# Patient Record
Sex: FEMALE | Race: BLACK OR AFRICAN AMERICAN | NOT HISPANIC OR LATINO | Employment: FULL TIME | URBAN - METROPOLITAN AREA
[De-identification: names, ages, dates, MRNs, and addresses within clinical notes are randomized per-mention and may not be internally consistent; named-entity substitution may affect disease eponyms.]

---

## 2017-02-28 ENCOUNTER — TRANSFERRED RECORDS (OUTPATIENT)
Dept: HEALTH INFORMATION MANAGEMENT | Facility: CLINIC | Age: 47
End: 2017-02-28

## 2017-03-07 ENCOUNTER — OFFICE VISIT (OUTPATIENT)
Dept: FAMILY MEDICINE | Facility: CLINIC | Age: 47
End: 2017-03-07
Payer: OTHER MISCELLANEOUS

## 2017-03-07 VITALS
HEART RATE: 88 BPM | SYSTOLIC BLOOD PRESSURE: 122 MMHG | RESPIRATION RATE: 16 BRPM | TEMPERATURE: 98.6 F | BODY MASS INDEX: 38.17 KG/M2 | DIASTOLIC BLOOD PRESSURE: 72 MMHG | WEIGHT: 202 LBS

## 2017-03-07 DIAGNOSIS — Z13.0 SCREENING FOR DISORDER OF BLOOD AND BLOOD-FORMING ORGANS: ICD-10-CM

## 2017-03-07 DIAGNOSIS — Z13.6 CARDIOVASCULAR SCREENING; LDL GOAL LESS THAN 130: ICD-10-CM

## 2017-03-07 DIAGNOSIS — S63.501A RIGHT WRIST SPRAIN, INITIAL ENCOUNTER: Primary | ICD-10-CM

## 2017-03-07 DIAGNOSIS — Z13.1 SCREENING FOR DIABETES MELLITUS: ICD-10-CM

## 2017-03-07 LAB
ALBUMIN SERPL-MCNC: 3.8 G/DL (ref 3.4–5)
ALP SERPL-CCNC: 100 U/L (ref 40–150)
ALT SERPL W P-5'-P-CCNC: 19 U/L (ref 0–50)
ANION GAP SERPL CALCULATED.3IONS-SCNC: 7 MMOL/L (ref 3–14)
AST SERPL W P-5'-P-CCNC: 12 U/L (ref 0–45)
BILIRUB SERPL-MCNC: 0.3 MG/DL (ref 0.2–1.3)
BUN SERPL-MCNC: 12 MG/DL (ref 7–30)
CALCIUM SERPL-MCNC: 9.1 MG/DL (ref 8.5–10.1)
CHLORIDE SERPL-SCNC: 108 MMOL/L (ref 94–109)
CHOLEST SERPL-MCNC: 151 MG/DL
CO2 SERPL-SCNC: 25 MMOL/L (ref 20–32)
CREAT SERPL-MCNC: 0.82 MG/DL (ref 0.52–1.04)
ERYTHROCYTE [DISTWIDTH] IN BLOOD BY AUTOMATED COUNT: 15.2 % (ref 10–15)
GFR SERPL CREATININE-BSD FRML MDRD: 76 ML/MIN/1.7M2
GLUCOSE SERPL-MCNC: 95 MG/DL (ref 70–99)
HCT VFR BLD AUTO: 36.2 % (ref 35–47)
HDLC SERPL-MCNC: 35 MG/DL
HGB BLD-MCNC: 12.1 G/DL (ref 11.7–15.7)
LDLC SERPL CALC-MCNC: 94 MG/DL
MCH RBC QN AUTO: 23 PG (ref 26.5–33)
MCHC RBC AUTO-ENTMCNC: 33.4 G/DL (ref 31.5–36.5)
MCV RBC AUTO: 69 FL (ref 78–100)
NONHDLC SERPL-MCNC: 116 MG/DL
PLATELET # BLD AUTO: 305 10E9/L (ref 150–450)
POTASSIUM SERPL-SCNC: 4 MMOL/L (ref 3.4–5.3)
PROT SERPL-MCNC: 7.4 G/DL (ref 6.8–8.8)
RBC # BLD AUTO: 5.26 10E12/L (ref 3.8–5.2)
SODIUM SERPL-SCNC: 140 MMOL/L (ref 133–144)
TRIGL SERPL-MCNC: 110 MG/DL
WBC # BLD AUTO: 7.7 10E9/L (ref 4–11)

## 2017-03-07 PROCEDURE — 80053 COMPREHEN METABOLIC PANEL: CPT | Performed by: FAMILY MEDICINE

## 2017-03-07 PROCEDURE — 99213 OFFICE O/P EST LOW 20 MIN: CPT | Performed by: FAMILY MEDICINE

## 2017-03-07 PROCEDURE — 85027 COMPLETE CBC AUTOMATED: CPT | Performed by: FAMILY MEDICINE

## 2017-03-07 PROCEDURE — 80061 LIPID PANEL: CPT | Performed by: PHYSICIAN ASSISTANT

## 2017-03-07 PROCEDURE — 36415 COLL VENOUS BLD VENIPUNCTURE: CPT | Performed by: PHYSICIAN ASSISTANT

## 2017-03-07 NOTE — LETTER
[facility]  [address] [address] [facility] [address]  [phone]  Dept: [phone]      3/7/2017    Re: Jessie Seymour  was seen on 3/7/17 for injury. For the next week patient is cleared to use her left hand for tasks such as stocking, , answering phones and anything else that she feels comfortable doing with her left. She will be reassessed in 1 week.   She is to continue with right hand restrictions as she is currently doing.     Cordially,        Dr. Kirill MD  [facility]

## 2017-03-07 NOTE — PROGRESS NOTES
SUBJECTIVE:                                                    Jessie Seymour is a 46 year old female who presents to clinic today for the following health issues:      Joint Pain     Onset: 02/28/2017    Description:   Location: right wrist  Character: Dull ache    Intensity: moderate    Progression of Symptoms: better    Accompanying Signs & Symptoms:  Other symptoms: tingling and swelling   History:   Previous similar pain: no       Precipitating factors:   Trauma or overuse: YES    Alleviating factors:  Improved by: rest/inactivity, heat and support wrap       Therapies Tried and outcome:     Was trying to help a patient who was falling back in her wheelchair and was accidentally yanked by the wrist.   States she felt a pull immediately and when she finally let go states it was sore.   Was seen at [facility] about an hour later and was told it was bruised and sprained.   Had an xray of right hand which was negative.   Soaks her wrist at night in water and epsom salt, tylenol and ASA alternating.   Patient would like a new work restriction letter for work allowing her the ability to use left hand while she recovers.      Problem list and histories reviewed & adjusted, as indicated.  Additional history: as documented    Patient Active Problem List   Diagnosis     iamLUMBAGO     iamACCIDENT ON INDUSTR PREMISES     iamMV COLLISION NOS-     MEDICAL HISTORY OF - VERY DIFFICULT LAB DRAW and IV START     Allergic rhinitis     Tobacco use disorder     Family history of breast cancer     Obesity     Ovarian cyst     Female pelvic pain     Intermittent asthma     CARDIOVASCULAR SCREENING; LDL GOAL LESS THAN 130     Migraine headache     Melasma     Burping     Hiatal hernia     Smoker     Alpha thalassemia trait     Moderate aortic regurgitation     Snoring     Metrorrhagia     Menorrhagia     Past Surgical History   Procedure Laterality Date     Pelvis laparoscopy,dx  2001     Laparoscopy for endometriosis      Laparoscopic cholecystectomy  1991     Cholecystectomy, Laparoscopic     Surgical history of -   1971     left ovarian surgery for herniation, benign     Esophagoscopy, gastroscopy, duodenoscopy (egd), combined  7/8/2014     Procedure: COMBINED ESOPHAGOSCOPY, GASTROSCOPY, DUODENOSCOPY (EGD), BIOPSY SINGLE OR MULTIPLE;  Surgeon: Rodolfo Carlin MD;  Location: [facility]     Biopsy       Colonoscopy       Gyn surgery       Hernia repair       Eye surgery       Biopsy breast seed localization Left 1/21/2016     Procedure: BIOPSY BREAST SEED LOCALIZATION;  Surgeon: Perry Desai MD;  Location: [facility]     Dilation and curettage, hysteroscopy diagnostic, combined N/A 5/24/2016     Procedure: COMBINED DILATION AND CURETTAGE, HYSTEROSCOPY DIAGNOSTIC;  Surgeon: Adis Cummings MD;  Location: [facility]     Hysterectomy total abdominal N/A 6/28/2016     Procedure: HYSTERECTOMY TOTAL ABDOMINAL;  Surgeon: Adis Cummings MD;  Location: [facility]     Laparoscopy diagnostic (gyn) N/A 6/28/2016     Procedure: LAPAROSCOPY DIAGNOSTIC (GYN);  Surgeon: Adis Cummings MD;  Location: [facility]     Laparotomy, lysis adhesions, combined N/A 6/28/2016     Procedure: COMBINED LAPAROTOMY, LYSIS ADHESIONS;  Surgeon: Adis Cummings MD;  Location: [facility]       Social History   Substance Use Topics     Smoking status: Current Every Day Smoker     Packs/day: 0.50     Years: 15.00     Types: Cigarettes     Smokeless tobacco: Never Used      Comment: Pt. smokes appx. 6 cigarettes daily     Alcohol use No     Family History   Problem Relation Age of Onset     Cardiovascular Mother      long QT syndrom     HEART DISEASE Mother      entire family has heart problems of some type     Hypertension Mother      Depression Mother      Breast Cancer Mother      Thyroid Disease Mother      OSTEOPOROSIS Mother      HEART DISEASE Brother      CP Hypertension-7 brothers-5 alive right now      Hypertension Brother      HEART DISEASE Brother      CP  Hypertension     HEART DISEASE Father      not sure about details, had stroke     Hypertension Father      HEART DISEASE Sister      stroke      DIABETES Sister      HEART DISEASE Brother      long QT syndrome- at age of 16     HEART DISEASE Maternal Grandmother      Hypertension Maternal Grandmother      HEART DISEASE Maternal Grandfather      Hypertension Maternal Grandfather      DIABETES Sister      6 sisters     Musculoskeletal Disorder Sister      Genetic Disorder Sister      lupus     Hypertension Sister      DIABETES Maternal Uncle      Hypertension Sister      CEREBROVASCULAR DISEASE Sister      Hypertension Brother      Hypertension Paternal Grandmother      Hypertension Paternal Grandfather      Hypertension Sister      Asthma Sister      Allergies Other      most everyone in family has some type of allergy     Asthma Other      CANCER Other 40     one maternal cousin with bca in 50s, one paternal cousin with bca in 40s     CEREBROVASCULAR DISEASE Sister      OSTEOPOROSIS Sister      Gynecology Sister      3 sisters with PCOS     Blood Disease Sister      trade for sickle cell anemia     Cancer - colorectal No family hx of      DIABETES Sister      DIABETES Other      Hypertension Sister      Hypertension Brother      Hypertension Cousin      CEREBROVASCULAR DISEASE Sister      Breast Cancer Cousin      Other Cancer Other      Other Cancer Cousin      Depression Sister      Asthma Sister      Asthma Nephew      OSTEOPOROSIS Sister      Genetic Disorder Sister      Genetic Disorder Brother      Thyroid Disease Mother      Thyroid Disease Other            Reviewed and updated as needed this visit by clinical staff  Tobacco  Allergies  Meds       Reviewed and updated as needed this visit by Provider         ROS:  Constitutional, msk, neuro systems are negative, except as otherwise noted.    OBJECTIVE:                                                    /72 (BP Location: Right arm, Patient  Position: Chair, Cuff Size: Adult Large)  Pulse 88  Temp 98.6  F (37  C) (Oral)  Resp 16  Wt 202 lb (91.6 kg)  LMP 06/22/2016 (Exact Date)  Breastfeeding? No  BMI 38.17 kg/m2  Body mass index is 38.17 kg/(m^2).  GENERAL: healthy, alert and no distress  MS: right hand- pain with active ROM at wrist, no obvious swelling noted, sensation intact.     Diagnostic Test Results:  none      ASSESSMENT/PLAN:                                                        1. Right wrist sprain, initial encounter  - progressing as expected. Will continue with brace for now - reviewed trying to exercise hand at end of day to avoid stiffness. new work restrictions letter written for patient.   - order for DME; Equipment being ordered: wrist splint  Dispense: 1 Package; Refill: 0    See Patient Instructions    Vani Roy MD  [facility]

## 2017-03-07 NOTE — PATIENT INSTRUCTIONS
Follow up in 1 week or sooner if needed  Wrist Sprain  A sprain is an injury to the ligaments or capsule that holds a joint together. There are no broken bones. Most sprains take about 3 to 6 weeks to heal. If it a severe sprain where the ligament is completely torn, it can take months to recover.    Most wrist sprains are treated with a splint, wrist brace, or elastic wrap for support. Severe sprains may require surgery.  Home care    Keep your arm elevated to reduce pain and swelling. This is very important during the first 48 hours.    Apply an ice pack over the injured area for 15 to 20 minutes every 3 to 6 hours. You should do this for the first 24 to 48 hours. You can make an ice pack by filling a plastic bag that seals at the top with ice cubes and then wrapping it with a thin towel. Continue to use ice packs for relief of pain and swelling as needed. As the ice melts, be careful to avoid getting your wrap, splint, or cast wet. After 48 hours, apply heat (warm shower or warm bath) for 15 to 20 minutes several times a day, or alternate ice and heat.     You may use over-the-counter pain medicine to control pain, unless another pain medicine was prescribed. If you have chronic liver or kidney disease or ever had a stomach ulcer or GI bleeding, talk with your doctor before using these medicines.    If you were given a splint or brace, wear it for the time advised by your doctor.  Follow-up care  Follow up with your healthcare provider as advised. Any X-rays you had today don t show any broken bones, breaks, or fractures. Sometimes fractures don t show up on the first X-ray. Bruises and sprains can sometimes hurt as much as a fracture. These injuries can take time to heal completely. If your symptoms don t improve or they get worse, talk with your doctor. You may need a repeat X-ray. If X-rays were taken, you will be told of any new findings that may affect your care.  When to seek medical advice  Call your  healthcare provider right away if any of these occur:    Pain or swelling increases    Fingers or hand becomes cold, blue, numb, or tingly    2029-3647 [employer] [address] [place] [address], [place] [address]. All rights reserved. This information is not intended as a substitute for professional medical care. Always follow your healthcare professional's instructions.

## 2017-03-07 NOTE — MR AVS SNAPSHOT
After Visit Summary   3/7/2017    Jessie Seymour    MRN: [id number]           Patient Information     Date Of Birth          1970        Visit Information        Provider Department      3/7/2017 9:00 AM Vani Roy MD [facility]        Today's Diagnoses     Right wrist sprain, initial encounter    -  1      Care Instructions      Follow up in 1 week or sooner if needed  Wrist Sprain  A sprain is an injury to the ligaments or capsule that holds a joint together. There are no broken bones. Most sprains take about 3 to 6 weeks to heal. If it a severe sprain where the ligament is completely torn, it can take months to recover.    Most wrist sprains are treated with a splint, wrist brace, or elastic wrap for support. Severe sprains may require surgery.  Home care    Keep your arm elevated to reduce pain and swelling. This is very important during the first 48 hours.    Apply an ice pack over the injured area for 15 to 20 minutes every 3 to 6 hours. You should do this for the first 24 to 48 hours. You can make an ice pack by filling a plastic bag that seals at the top with ice cubes and then wrapping it with a thin towel. Continue to use ice packs for relief of pain and swelling as needed. As the ice melts, be careful to avoid getting your wrap, splint, or cast wet. After 48 hours, apply heat (warm shower or warm bath) for 15 to 20 minutes several times a day, or alternate ice and heat.     You may use over-the-counter pain medicine to control pain, unless another pain medicine was prescribed. If you have chronic liver or kidney disease or ever had a stomach ulcer or GI bleeding, talk with your doctor before using these medicines.    If you were given a splint or brace, wear it for the time advised by your doctor.  Follow-up care  Follow up with your healthcare provider as advised. Any X-rays you had today don t show any broken bones, breaks, or fractures.  Sometimes fractures don t show up on the first X-ray. Bruises and sprains can sometimes hurt as much as a fracture. These injuries can take time to heal completely. If your symptoms don t improve or they get worse, talk with your doctor. You may need a repeat X-ray. If X-rays were taken, you will be told of any new findings that may affect your care.  When to seek medical advice  Call your healthcare provider right away if any of these occur:    Pain or swelling increases    Fingers or hand becomes cold, blue, numb, or tingly    5020-0146 [employer]. [address] [place] [address]. All rights reserved. This information is not intended as a substitute for professional medical care. Always follow your healthcare professional's instructions.              Follow-ups after your visit        Your next 10 appointments already scheduled     Mar 13, 2017  3:15 PM CDT   MA SCREENING DIGITAL BILATERAL with EAMA1   [facility] ([facility])    [address] [place] [address] ,[address]  [facility] [address] [phone]           Do not use any powder, lotion or deodorant under your arms or on your breast. If you do, we will ask you to remove it before your exam.  Wear comfortable, two-piece clothing.  If you have any allergies, tell your care team.  Bring any previous mammograms from other facilities or have them mailed to the breast center.              Who to contact     If you have questions or need follow up information about today's clinic visit or your schedule please contact [facility] directly at [phone].  Normal or non-critical lab and imaging results will be communicated to you by MyChart, letter or phone within 4 business days after the clinic has received the results. If you do not hear from us within 7 days, please contact the clinic through MyChart or phone. If you have a critical or abnormal lab result, we will notify you by phone as soon as  possible.  Submit refill requests through [employer] or call your pharmacy and they will forward the refill request to us. Please allow 3 business days for your refill to be completed.          Additional Information About Your Visit        [employer] Information     [employer] gives you secure access to your electronic health record. If you see a primary care provider, you can also send messages to your care team and make appointments. If you have questions, please call your primary care clinic.  If you do not have a primary care provider, please call [phone] and they will assist you.        Care EveryWhere ID     This is your Care EveryWhere ID. This could be used by other organizations to access your [facility] medical records  [id number]        Your Vitals Were     Pulse Temperature Respirations Last Period Breastfeeding? BMI (Body Mass Index)    88 98.6  F (37  C) (Oral) 16 06/22/2016 (Exact Date) No 38.17 kg/m2       Blood Pressure from Last 3 Encounters:   03/07/17 122/72   12/15/16 112/70   08/01/16 108/70    Weight from Last 3 Encounters:   03/07/17 202 lb (91.6 kg)   12/15/16 205 lb 14.4 oz (93.4 kg)   08/01/16 203 lb 14.4 oz (92.5 kg)              Today, you had the following     No orders found for display         Today's Medication Changes          These changes are accurate as of: 3/7/17  9:33 AM.  If you have any questions, ask your nurse or doctor.               Start taking these medicines.        Dose/Directions    order for DME   Used for:  Right wrist sprain, initial encounter   Started by:  Vani Roy MD        Equipment being ordered: wrist splint   Quantity:  1 Package   Refills:  0         These medicines have changed or have updated prescriptions.        Dose/Directions    fluticasone 50 MCG/ACT spray   Commonly known as:  FLONASE   This may have changed:    - when to take this  - reasons to take this   Used for:  Seasonal allergic rhinitis        Dose:  1-2 spray   Spray 1-2  sprays into both nostrils daily   Quantity:  1 g   Refills:  11            Where to get your medicines      Some of these will need a paper prescription and others can be bought over the counter.  Ask your nurse if you have questions.     Bring a paper prescription for each of these medications     order for DME                Primary Care Provider Office Phone # Fax #    Ramona Ann Aaseby-Aguilera, PA-C [phone]       [facility] [address]  [facility] [address]        Thank you!     Thank you for choosing [facility]  for your care. Our goal is always to provide you with excellent care. Hearing back from our patients is one way we can continue to improve our services. Please take a few minutes to complete the written survey that you may receive in the mail after your visit with us. Thank you!             Your Updated Medication List - Protect others around you: Learn how to safely use, store and throw away your medicines at www.disposemymeds.org.          This list is accurate as of: 3/7/17  9:33 AM.  Always use your most recent med list.                   Brand Name Dispense Instructions for use    * albuterol (2.5 MG/3ML) 0.083% neb solution     3 Box    Take 1 vial (2.5 mg) by nebulization every 6 hours as needed for shortness of breath / dyspnea       * albuterol 108 (90 BASE) MCG/ACT Inhaler    albuterol    3 Inhaler    Inhale 2 puffs into the lungs 4 times daily as needed for shortness of breath / dyspnea       aspirin 81 MG tablet      Take 81 mg by mouth daily       camphor-menthol 0.5-0.5 % Lotn    SARNA    59 mL    Apply 1 mL topically every 6 hours as needed for skin care Apply to palms of hands 2-3 times daily       diphenhydrAMINE 25 MG tablet    BENADRYL    60 tablet    Take 1-2 tablets (25-50 mg) by mouth every 6 hours as needed for itching or allergies       fluticasone 50 MCG/ACT spray    FLONASE    1 g    Spray 1-2 sprays into both nostrils  daily       hydrocortisone valerate 0.2 % ointment    WEST-LESLY    60 g    Apply sparingly to affected area three times daily as needed.       ibuprofen 600 MG tablet    ADVIL/MOTRIN    90 tablet    Take 1 tablet (600 mg) by mouth every 6 hours as needed for moderate pain       ketorolac 10 MG tablet    TORADOL    4 tablet    Take 1 tablet (10 mg) by mouth every 6 hours as needed for pain       nortriptyline 10 MG capsule    PAMELOR    90 capsule    Using 10 mg once every 3 days       order for DME     1 Units    Please dispense one neb machine Use as directed Wanda Hines MD       order for DME     1 Package    Equipment being ordered: wrist splint       PREDNISONE PO      Take 20 mg by mouth 2 times daily       SUDAFED PO      Take 30 mg by mouth daily as needed for congestion       * Notice:  This list has 2 medication(s) that are the same as other medications prescribed for you. Read the directions carefully, and ask your doctor or other care provider to review them with you.

## 2017-03-07 NOTE — NURSING NOTE
"Chief Complaint   Patient presents with     Work Comp     work comp injury, date of injury 02/28/2017, c/o right wrist pain and swelling     Initial /72 (BP Location: Right arm, Patient Position: Chair, Cuff Size: Adult Large)  Pulse 88  Temp 98.6  F (37  C) (Oral)  Resp 16  Wt 202 lb (91.6 kg)  LMP 06/22/2016 (Exact Date)  Breastfeeding? No  BMI 38.17 kg/m2 Estimated body mass index is 38.17 kg/(m^2) as calculated from the following:    Height as of 12/15/16: 5' 1\" (1.549 m).    Weight as of this encounter: 202 lb (91.6 kg)..  BP completed using cuff size large.            María Elena Alejandro/NABEEL    "

## 2017-03-07 NOTE — LETTER
[facility]  [address] [facility] [address]  [phone]  Dept: [phone]      3/7/2017    Re: Jessie Seymour      TO WHOM IT MAY CONCERN:    Jessie Seymour  was seen on 3/7/17 for injury. For the next week patient is cleared to use her left hand for tasks such as stocking, , answering phones and anything else that she feels comfortable doing with her left. She will be reassessed in 1 week.       Cordially,        Dr. Kirill MD  /[facility]//

## 2017-03-13 ENCOUNTER — RADIANT APPOINTMENT (OUTPATIENT)
Dept: MAMMOGRAPHY | Facility: CLINIC | Age: 47
End: 2017-03-13
Payer: COMMERCIAL

## 2017-03-13 DIAGNOSIS — Z12.31 VISIT FOR SCREENING MAMMOGRAM: ICD-10-CM

## 2017-03-13 PROCEDURE — G0202 SCR MAMMO BI INCL CAD: HCPCS | Mod: TC

## 2017-03-14 ENCOUNTER — OFFICE VISIT (OUTPATIENT)
Dept: FAMILY MEDICINE | Facility: CLINIC | Age: 47
End: 2017-03-14
Payer: OTHER MISCELLANEOUS

## 2017-03-14 VITALS
DIASTOLIC BLOOD PRESSURE: 77 MMHG | TEMPERATURE: 98.1 F | BODY MASS INDEX: 38.17 KG/M2 | RESPIRATION RATE: 16 BRPM | WEIGHT: 202 LBS | SYSTOLIC BLOOD PRESSURE: 124 MMHG | HEART RATE: 88 BPM

## 2017-03-14 DIAGNOSIS — S63.501D RIGHT WRIST SPRAIN, SUBSEQUENT ENCOUNTER: Primary | ICD-10-CM

## 2017-03-14 PROCEDURE — 99213 OFFICE O/P EST LOW 20 MIN: CPT | Performed by: FAMILY MEDICINE

## 2017-03-14 NOTE — PROGRESS NOTES
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   Since the last visit, states pain is improving and is able to keep wrist out of brace at night.   Has since been fired and is working to be re-instated at another facility.        Problem list and histories reviewed & adjusted, as indicated.  Additional history: as documented    Patient Active Problem List   Diagnosis     iamLUMBAGO     iamACCIDENT ON INDUSTR PREMISES     iamMV COLLISION NOS-     MEDICAL HISTORY OF - VERY DIFFICULT LAB DRAW and IV START     Allergic rhinitis     Tobacco use disorder     Family history of breast cancer     Obesity     Ovarian cyst     Female pelvic pain     Intermittent asthma     CARDIOVASCULAR SCREENING; LDL GOAL LESS THAN 130     Migraine headache     Melasma     Burping     Hiatal hernia     Smoker     Alpha thalassemia trait     Moderate aortic regurgitation     Snoring     Metrorrhagia     Menorrhagia     Past Surgical History   Procedure Laterality Date     Pelvis laparoscopy,dx  2001     Laparoscopy for endometriosis     Laparoscopic cholecystectomy  1991     Cholecystectomy, Laparoscopic     Surgical history of -   1971     left ovarian surgery for herniation, benign     Esophagoscopy, gastroscopy, duodenoscopy (egd), combined  7/8/2014     Procedure: COMBINED ESOPHAGOSCOPY, GASTROSCOPY, DUODENOSCOPY (EGD), BIOPSY SINGLE OR MULTIPLE;  Surgeon: Rodolfo Carlin MD;  Location:  GI     Biopsy       Colonoscopy       Gyn surgery       Hernia repair       Eye surgery       Biopsy breast seed localization Left 1/21/2016     Procedure: BIOPSY BREAST SEED LOCALIZATION;  Surgeon: Perry Desai MD;  Location:  OR     Dilation and curettage, hysteroscopy diagnostic,  combined N/A 2016     Procedure: COMBINED DILATION AND CURETTAGE, HYSTEROSCOPY DIAGNOSTIC;  Surgeon: Adis Cummings MD;  Location: [facility] OR     Hysterectomy total abdominal N/A 2016     Procedure: HYSTERECTOMY TOTAL ABDOMINAL;  Surgeon: Adis Cummings MD;  Location: [facility] OR     Laparoscopy diagnostic (gyn) N/A 2016     Procedure: LAPAROSCOPY DIAGNOSTIC (GYN);  Surgeon: Adis Cummings MD;  Location: [facility] OR     Laparotomy, lysis adhesions, combined N/A 2016     Procedure: COMBINED LAPAROTOMY, LYSIS ADHESIONS;  Surgeon: Adis Cummings MD;  Location: [facility] OR       Social History   Substance Use Topics     Smoking status: Current Every Day Smoker     Packs/day: 0.50     Years: 15.00     Types: Cigarettes     Smokeless tobacco: Never Used      Comment: Pt. smokes appx. 6 cigarettes daily     Alcohol use No     Family History   Problem Relation Age of Onset     Cardiovascular Mother      long QT syndrom     HEART DISEASE Mother      entire family has heart problems of some type     Hypertension Mother      Depression Mother      Breast Cancer Mother      Thyroid Disease Mother      OSTEOPOROSIS Mother      HEART DISEASE Brother      CP Hypertension-7 brothers-5 alive right now      Hypertension Brother      HEART DISEASE Brother      CP Hypertension     HEART DISEASE Father      not sure about details, had stroke     Hypertension Father      HEART DISEASE Sister      stroke      DIABETES Sister      HEART DISEASE Brother      long QT syndrome- at age of 16     HEART DISEASE Maternal Grandmother      Hypertension Maternal Grandmother      HEART DISEASE Maternal Grandfather      Hypertension Maternal Grandfather      DIABETES Sister      6 sisters     Musculoskeletal Disorder Sister      Genetic Disorder Sister      lupus     Hypertension Sister      DIABETES Maternal Uncle      Hypertension Sister      CEREBROVASCULAR DISEASE Sister      Hypertension Brother      Hypertension Paternal  Grandmother      Hypertension Paternal Grandfather      Hypertension Sister      Asthma Sister      Allergies Other      most everyone in family has some type of allergy     Asthma Other      CANCER Other 40     one maternal cousin with bca in 50s, one paternal cousin with bca in 40s     CEREBROVASCULAR DISEASE Sister      OSTEOPOROSIS Sister      Gynecology Sister      3 sisters with PCOS     Blood Disease Sister      trade for sickle cell anemia     Cancer - colorectal No family hx of      DIABETES Sister      DIABETES Other      Hypertension Sister      Hypertension Brother      Hypertension Cousin      CEREBROVASCULAR DISEASE Sister      Breast Cancer Cousin      Other Cancer Other      Other Cancer Cousin      Depression Sister      Asthma Sister      Asthma Nephew      OSTEOPOROSIS Sister      Genetic Disorder Sister      Genetic Disorder Brother      Thyroid Disease Mother      Thyroid Disease Other            Reviewed and updated as needed this visit by clinical staff  Tobacco       Reviewed and updated as needed this visit by Provider         ROS:  Constitutional, msk systems are negative, except as otherwise noted.    OBJECTIVE:                                                    /77 (BP Location: Right arm, Patient Position: Chair, Cuff Size: Adult Large)  Pulse 88  Temp 98.1  F (36.7  C) (Oral)  Resp 16  Wt 202 lb (91.6 kg)  LMP 06/22/2016 (Exact Date)  Breastfeeding? No  BMI 38.17 kg/m2  Body mass index is 38.17 kg/(m^2).  GENERAL: healthy, alert and no distress  MS: right wrist- mild TTP Flexor carpi ulnar tendon, pain with flexion and extension at wrist,  strength- 4/5  NEURO: sensation intact   Diagnostic Test Results:  none      ASSESSMENT/PLAN:                                                        1. Right wrist sprain, subsequent encounter  - improving.  Patient to continue with right wrist restrictions for now. Will refer to PT to help with her rehab process.   - [facility], AND  CHIROPRACTIC REFERRAL    See Patient Instructions    Vnai Roy MD  [facility]

## 2017-03-14 NOTE — NURSING NOTE
"Chief Complaint   Patient presents with     Work Comp     f/u work comp injury---c/o right wrist injury---has improved     Initial /77 (BP Location: Right arm, Patient Position: Chair, Cuff Size: Adult Large)  Pulse 88  Temp 98.1  F (36.7  C) (Oral)  Resp 16  Wt 202 lb (91.6 kg)  LMP 06/22/2016 (Exact Date)  Breastfeeding? No  BMI 38.17 kg/m2 Estimated body mass index is 38.17 kg/(m^2) as calculated from the following:    Height as of 12/15/16: 5' 1\" (1.549 m).    Weight as of this encounter: 202 lb (91.6 kg)..  BP completed using cuff size large.            María Elena Alejandro/NABEEL    "

## 2017-03-14 NOTE — LETTER
[facility]  [id number] [place] [id number]  [phone]  Dept: [phone]      3/14/2017    Re: Jessie Seymour      TO WHOM IT MAY CONCERN:    Jessie Seymour  was seen on 3/14/17.  Patient's injury is progressing as expected. Will refer to physical therapy for further treatment. 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 She is to continue with right hand restrictions for now.       Cordially,          Dr. Kirill MD  [facility]

## 2017-03-14 NOTE — MR AVS SNAPSHOT
After Visit Summary   3/14/2017    eJssie Seymour    MRN: [id number]           Patient Information     Date Of Birth          1970        Visit Information        Provider Department      3/14/2017 10:00 AM Vani Roy MD [facility]        Today's Diagnoses     Right wrist sprain, subsequent encounter    -  1      Care Instructions    Follow up with physical therapy for further treatment.         Follow-ups after your visit        Additional Services     [facility] PT, HAND, AND CHIROPRACTIC REFERRAL       **This order will print in the [facility] Scheduling Office**    Physical Therapy, Hand Therapy and Chiropractic Care are available through:    *[facility]  *[facility]  *[facility]    Call one number to schedule at any of the above locations: [phone].    Your provider has referred you to: Physical Therapy at [facility] or [facility]    Indication/Reason for Referral: Hand Pain  Onset of Illness: 2/28/17  Therapy Orders: Evaluate and Treat  Special Programs: None  Special Request: None    Jai Vergara      Additional Comments for the Therapist or Chiropractor:     Please be aware that coverage of these services is subject to the terms and limitations of your health insurance plan.  Call member services at your health plan with any benefit or coverage questions.      Please bring the following to your appointment:    *Your personal calendar for scheduling future appointments  *Comfortable clothing                  Who to contact     If you have questions or need follow up information about today's clinic visit or your schedule please contact [facility] directly at [phone].  Normal or non-critical lab and imaging results will be communicated to you by MyChart, letter or phone within 4 business days after the clinic has received the results. If you do not hear from us within 7 days, please contact  the clinic through [employer] or phone. If you have a critical or abnormal lab result, we will notify you by phone as soon as possible.  Submit refill requests through [employer] or call your pharmacy and they will forward the refill request to us. Please allow 3 business days for your refill to be completed.          Additional Information About Your Visit        [employer] Social Information     [employer] gives you secure access to your electronic health record. If you see a primary care provider, you can also send messages to your care team and make appointments. If you have questions, please call your primary care clinic.  If you do not have a primary care provider, please call [phone] and they will assist you.        Care EveryWhere ID     This is your Care EveryWhere ID. This could be used by other organizations to access your [facility] medical records  [id number]        Your Vitals Were     Pulse Temperature Respirations Last Period Breastfeeding? BMI (Body Mass Index)    88 98.1  F (36.7  C) (Oral) 16 06/22/2016 (Exact Date) No 38.17 kg/m2       Blood Pressure from Last 3 Encounters:   03/14/17 124/77   03/07/17 122/72   12/15/16 112/70    Weight from Last 3 Encounters:   03/14/17 202 lb (91.6 kg)   03/07/17 202 lb (91.6 kg)   12/15/16 205 lb 14.4 oz (93.4 kg)              We Performed the Following     [facility], AND [facility]RACT[facility] REFERRAL          Today's Medication Changes          These changes are accurate as of: 3/14/17 10:00 AM.  If you have any questions, ask your nurse or doctor.               These medicines have changed or have updated prescriptions.        Dose/Directions    fluticasone 50 MCG/ACT spray   Commonly known as:  FLONASE   This may have changed:    - when to take this  - reasons to take this   Used for:  Seasonal allergic rhinitis        Dose:  1-2 spray   Spray 1-2 sprays into both nostrils daily   Quantity:  1 g   Refills:  11                Primary Care Provider Office Phone # Fax #     Ramona Ann Aaseby-Aguilera, PA-C [phone]       [facility] [address]  [facility] [address]        Thank you!     Thank you for choosing [facility]  for your care. Our goal is always to provide you with excellent care. Hearing back from our patients is one way we can continue to improve our services. Please take a few minutes to complete the written survey that you may receive in the mail after your visit with us. Thank you!             Your Updated Medication List - Protect others around you: Learn how to safely use, store and throw away your medicines at www.disposemymeds.org.          This list is accurate as of: 3/14/17 10:00 AM.  Always use your most recent med list.                   Brand Name Dispense Instructions for use    * albuterol (2.5 MG/3ML) 0.083% neb solution     3 Box    Take 1 vial (2.5 mg) by nebulization every 6 hours as needed for shortness of breath / dyspnea       * albuterol 108 (90 BASE) MCG/ACT Inhaler    albuterol    3 Inhaler    Inhale 2 puffs into the lungs 4 times daily as needed for shortness of breath / dyspnea       aspirin 81 MG tablet      Take 81 mg by mouth daily       camphor-menthol 0.5-0.5 % Lotn    SARNA    59 mL    Apply 1 mL topically every 6 hours as needed for skin care Apply to palms of hands 2-3 times daily       diphenhydrAMINE 25 MG tablet    BENADRYL    60 tablet    Take 1-2 tablets (25-50 mg) by mouth every 6 hours as needed for itching or allergies       fluticasone 50 MCG/ACT spray    FLONASE    1 g    Spray 1-2 sprays into both nostrils daily       hydrocortisone valerate 0.2 % ointment    WEST-LESLY    60 g    Apply sparingly to affected area three times daily as needed.       ibuprofen 600 MG tablet    ADVIL/MOTRIN    90 tablet    Take 1 tablet (600 mg) by mouth every 6 hours as needed for moderate pain       ketorolac 10 MG tablet    TORADOL    4 tablet    Take 1 tablet (10 mg) by mouth every 6 hours as  needed for pain       nortriptyline 10 MG capsule    PAMELOR    90 capsule    Using 10 mg once every 3 days       order for DME     1 Units    Please dispense one neb machine Use as directed Wanda Hines MD       order for DME     1 Package    Equipment being ordered: wrist splint       PREDNISONE PO      Take 20 mg by mouth 2 times daily       SUDAFED PO      Take 30 mg by mouth daily as needed for congestion       * Notice:  This list has 2 medication(s) that are the same as other medications prescribed for you. Read the directions carefully, and ask your doctor or other care provider to review them with you.

## 2017-03-20 ENCOUNTER — THERAPY VISIT (OUTPATIENT)
Dept: OCCUPATIONAL THERAPY | Facility: CLINIC | Age: 47
End: 2017-03-20
Payer: OTHER MISCELLANEOUS

## 2017-03-20 DIAGNOSIS — M25.531 RIGHT WRIST PAIN: Primary | ICD-10-CM

## 2017-03-20 PROCEDURE — 97165 OT EVAL LOW COMPLEX 30 MIN: CPT | Mod: GO | Performed by: OCCUPATIONAL THERAPIST

## 2017-03-20 PROCEDURE — 29125 APPL SHORT ARM SPLINT STATIC: CPT | Mod: GO | Performed by: OCCUPATIONAL THERAPIST

## 2017-03-20 NOTE — MR AVS SNAPSHOT
After Visit Summary   3/20/2017    Jessie Seymour    MRN: [id number]           Patient Information     Date Of Birth          1970        Visit Information        Provider Department      3/20/2017 8:30 AM Zuleima Porter OT [facility]        Today's Diagnoses     Right wrist pain    -  1       Follow-ups after your visit        Your next 10 appointments already scheduled     Mar 27, 2017 10:00 AM CDT   [facility] ([facility]  )    [address] [address] [address]   [phone]              Who to contact     If you have questions or need follow up information about today's clinic visit or your schedule please contact [facility] directly at [phone].  Normal or non-critical lab and imaging results will be communicated to you by MyChart, letter or phone within 4 business days after the clinic has received the results. If you do not hear from us within 7 days, please contact the clinic through [employer] or phone. If you have a critical or abnormal lab result, we will notify you by phone as soon as possible.  Submit refill requests through [employer] or call your pharmacy and they will forward the refill request to us. Please allow 3 business days for your refill to be completed.          Additional Information About Your Visit        MyChart Information     [employer] gives you secure access to your electronic health record. If you see a primary care provider, you can also send messages to your care team and make appointments. If you have questions, please call your primary care clinic.  If you do not have a primary care provider, please call [phone] and they will assist you.        Care EveryWhere ID     This is your Care EveryWhere ID. This could be used by other organizations to access your [facility] medical records  [id number]        Your Vitals Were     Last Period                   06/22/2016 (Exact  Date)            Blood Pressure from Last 3 Encounters:   03/14/17 124/77   03/07/17 122/72   12/15/16 112/70    Weight from Last 3 Encounters:   03/14/17 91.6 kg (202 lb)   03/07/17 91.6 kg (202 lb)   12/15/16 93.4 kg (205 lb 14.4 oz)              We Performed the Following     APPLY SHORT ARM SPLINT STATIC     HC OT EVAL, LOW COMPLEXITY          Today's Medication Changes          These changes are accurate as of: 3/20/17 12:05 PM.  If you have any questions, ask your nurse or doctor.               These medicines have changed or have updated prescriptions.        Dose/Directions    fluticasone 50 MCG/ACT spray   Commonly known as:  FLONASE   This may have changed:    - when to take this  - reasons to take this   Used for:  Seasonal allergic rhinitis        Dose:  1-2 spray   Spray 1-2 sprays into both nostrils daily   Quantity:  1 g   Refills:  11                Primary Care Provider Office Phone # Fax #    Areli Ann Aaseby-Aguilera, PA-C [phone]       [facility] [address] [facility] [address]        Thank you!     Thank you for choosing [facility]  for your care. Our goal is always to provide you with excellent care. Hearing back from our patients is one way we can continue to improve our services. Please take a few minutes to complete the written survey that you may receive in the mail after your visit with us. Thank you!             Your Updated Medication List - Protect others around you: Learn how to safely use, store and throw away your medicines at www.disposemymeds.org.          This list is accurate as of: 3/20/17 12:05 PM.  Always use your most recent med list.                   Brand Name Dispense Instructions for use    * albuterol (2.5 MG/3ML) 0.083% neb solution     3 Box    Take 1 vial (2.5 mg) by nebulization every 6 hours as needed for shortness of breath / dyspnea       * albuterol 108 (90 BASE) MCG/ACT Inhaler    albuterol    3 Inhaler     Inhale 2 puffs into the lungs 4 times daily as needed for shortness of breath / dyspnea       aspirin 81 MG tablet      Take 81 mg by mouth daily       camphor-menthol 0.5-0.5 % Lotn    SARNA    59 mL    Apply 1 mL topically every 6 hours as needed for skin care Apply to palms of hands 2-3 times daily       diphenhydrAMINE 25 MG tablet    BENADRYL    60 tablet    Take 1-2 tablets (25-50 mg) by mouth every 6 hours as needed for itching or allergies       fluticasone 50 MCG/ACT spray    FLONASE    1 g    Spray 1-2 sprays into both nostrils daily       hydrocortisone valerate 0.2 % ointment    WEST-LESLY    60 g    Apply sparingly to affected area three times daily as needed.       ibuprofen 600 MG tablet    ADVIL/MOTRIN    90 tablet    Take 1 tablet (600 mg) by mouth every 6 hours as needed for moderate pain       ketorolac 10 MG tablet    TORADOL    4 tablet    Take 1 tablet (10 mg) by mouth every 6 hours as needed for pain       nortriptyline 10 MG capsule    PAMELOR    90 capsule    Using 10 mg once every 3 days       order for DME     1 Units    Please dispense one neb machine Use as directed Wanda Hines MD       order for DME     1 Package    Equipment being ordered: wrist splint       PREDNISONE PO      Take 20 mg by mouth 2 times daily       SUDAFED PO      Take 30 mg by mouth daily as needed for congestion       * Notice:  This list has 2 medication(s) that are the same as other medications prescribed for you. Read the directions carefully, and ask your doctor or other care provider to review them with you.

## 2017-03-20 NOTE — PROGRESS NOTES
Hand Therapy Initial Evaluation    Current Date:  3/20/2017    Diagnosis: R wrist sprain  Onset:  2/28/17  MD order:  3/14/17            Subjective  Jessie Seymour is a 46 year old Right hand dominant female.    Patient reports symptoms of pain, stiffness/loss of motion, weakness/loss of strength, edema, numbness and tingling  of the right wrist which occurred due to a patient at working grabbing onto wrist while falling down into w/c. Since onset symptoms are Gradually getting better..  Special tests:  x-ray.  Previous treatment: OTC thumb spica from MD.    General health as reported by patient is good.  Pertinent medical history includes:overweight, heart problems, anemia, asthma, migraines/headaches, smoking.  Medical allergies:medications.  Surgical history: other: listed in medical chart.  Medication history: anti-inflammatory, pain, preventative for migraines, torodol.  Pt states that her son will be having surgery next week, he will be NWB BLE.    Current occupation is Nurse, working at nursing home  Currently working in alternate job-pill setup/INR  Job Tasks: prolonged standing, lifting/carrying, pushing/pulling, repetitive tasks, computer work  Barriers include:none  Prior functional level:  independent-shared household chores  Red flags:  none    Occupational Performance Deficits as reported by patient: bathing/showering, toileting, dressing, feeding, functional mobility, hygiene and grooming, care of pets, child rearing, health management and maintenance, home establishment and management, meal preparation and cleanup, shopping, work and volunteer activities    Additional Occupational Profile Information (patterns of daily living, interests, values and needs): Pt would like to decrease pain in order to return to PLOF with ADL's, IADL's and work tasks.       Functional Outcome Measure:   Upper Extremity Functional Index Score:  SCORE:   Column Totals: 40/80   (A lower score indicates greater  disability.)      Objective  Pain Level Report  VAS(0-10) 3/20/2017   At Rest: 2/10   At Worst: 4/10     Report of Pain:  Location:  Ulnar border of FA/wrist, pt stating thumb pain d/t splint use  Pain Quality:  Dull, Sharp and Throbbing  Frequency: constant    Pain is worst:  daytime or nighttime  Exacerbated by: general use of hand  Relieved by:  cold, heat and rest. Use of tylenol    Edema:  MILD  Sensation: WNL throughout all nerve distributions; per patient report    ROM:  Wrist  3/20/2017   AROM(PROM) Left Right   Extension 70 55   Flexion 63 55   RD 9 3   UD 40 32     Tenderness: Pain level report on scale 0-10/10  Date 3/20/2017    Side Right    Ulna Styloid 0    TFCC 4    Distal Radius 0    Radial Styloid 0    DRUJ 2    Volar Scaphoid 1    Dorsal Scaphoid 1    Volar Lunate 4    Dorsal Lunate 2      Special Tests  Ulnar Dorsal Zone: (+ for hypermobile or - for hypomobile) Pain 0-10/10  Date 3/20/2017    Side Right    Radiocarpal P/S (TFCC--stab f/a, rotate with some compression) 4    Ballottement II P/S (TFCC--stab hand/wrist, pt p/s) 4    TFCC load Test (UD, axially load wrist c volar/dorsal mvmt) Hyper  4    UMTDG test (TFCC--approximate the pisotriquetral and ulna) 5    Lunotriquetral Sheer Test nt    Piano Key Sign (DRUJ) nt    Piano Key Test (DRUJ--distal ulna moved in pro/sup) nt    Ballottement I: E/F (DRUJ--stabilize hand/wrist, pt e/f of elbow) nt    Volar RU Ligament Shift (DRUJ--stab ulna and wrist, push radius volarly) nt    Dorsal RU Ligament Shift (DRUJ--stab ulna and wrist, push radius dorsally) nt        Strength: (Measured in pounds, pain scale 0-10/10)    Date 3/20/2017        Trials Left Right Left Right Left Right Left Right Left Right Left Right   1 64 20             2               3               Avg               Pain  4                 Assessment  Patient presents with symptoms consistent with diagnosis of wrist sprain,  with conservative intervention.  Special testing during  evaluation indicating possible involvement of the TFCC.    Patient's limitations or Problem List includes:  Pain, Decreased ROM/motion, Weakness, Decreased stability, Decreased , Decreased coordination, Decreased dexterity and Tightness in musculature of the right wrist and hand which interferes with the patient's ability to perform Self Care Tasks, Work Tasks, Recreational Activities, Household Chores and Driving  as compared to previous level of function.    Rehab Potential:  Excellent - Return to full activity, no limitations    Patient will benefit from skilled Occupational Therapy to increase ROM, flexibility, overall strength, stability of wrist, coordination and dexterity and decrease pain and muscle tension to return to previous activity level and resume normal daily tasks and to reach their rehab potential.    Assessment of Occupational Performance:  5 or more Performance Deficits  Identified Performance Deficits: bathing/showering, toileting, dressing, feeding, functional mobility, hygiene and grooming, care of pets, child rearing, health management and maintenance, home establishment and management, meal preparation and cleanup, shopping, work and volunteer activities      Clinical Decision Making (Complexity): Low complexity    Barriers to Learning:  No barrier    Communication Issues:  Patient appears to be able to clearly communicate and understand verbal and written communication and follow directions correctly.    Treatment Explanation:  The following has been discussed with the patient:  RX ordered/plan of care  Anticipated outcomes  Possible risks and side effects    Plan  Frequency:  1 X week, once daily  Duration:  for 8 weeks    Treatment Plan:  Modalities:  US and Iontophoresis  Therapeutic Exercise:  AROM, AAROM, PROM, Tendon Gliding, Isotonics and Isometrics  Neuromuscular re-education:  Coordination/Dexterity, Proprioceptive Training, Posture and Kinesiotaping  Manual Techniques:  Joint  mobilization, Myofascial release and Manual edema mobilization  Orthotic Fabrication:  Static orthosis  Self Care:  Self Care Tasks, Ergonomic Considerations, Work Tasks and Education    Discharge Plan:  Achieve all LTG.  Independent in home treatment program.  Return to work with or without restrictions.  Reach maximal therapeutic benefit.    Home Exercise Program:  Gentle use of R hand  R circumferential splint-full time-off for hygiene    Next Visit:  Assess splint, modify PRN  US  MFR  Initiate gentle wrist stabilization

## 2017-03-27 ENCOUNTER — THERAPY VISIT (OUTPATIENT)
Dept: OCCUPATIONAL THERAPY | Facility: CLINIC | Age: 47
End: 2017-03-27
Payer: OTHER MISCELLANEOUS

## 2017-03-27 DIAGNOSIS — M25.531 RIGHT WRIST PAIN: ICD-10-CM

## 2017-03-27 PROCEDURE — 29125 APPL SHORT ARM SPLINT STATIC: CPT | Mod: GO | Performed by: OCCUPATIONAL THERAPIST

## 2017-03-27 PROCEDURE — 97110 THERAPEUTIC EXERCISES: CPT | Mod: GO | Performed by: OCCUPATIONAL THERAPIST

## 2017-04-03 ENCOUNTER — TELEPHONE (OUTPATIENT)
Dept: FAMILY MEDICINE | Facility: CLINIC | Age: 47
End: 2017-04-03

## 2017-04-03 ENCOUNTER — THERAPY VISIT (OUTPATIENT)
Dept: OCCUPATIONAL THERAPY | Facility: CLINIC | Age: 47
End: 2017-04-03
Payer: OTHER MISCELLANEOUS

## 2017-04-03 DIAGNOSIS — M25.531 RIGHT WRIST PAIN: ICD-10-CM

## 2017-04-03 PROCEDURE — 97140 MANUAL THERAPY 1/> REGIONS: CPT | Mod: GO | Performed by: OCCUPATIONAL THERAPIST

## 2017-04-03 PROCEDURE — 97112 NEUROMUSCULAR REEDUCATION: CPT | Mod: GO | Performed by: OCCUPATIONAL THERAPIST

## 2017-04-03 NOTE — TELEPHONE ENCOUNTER
Panel Management Review      Patient has the following on her problem list:     Asthma review     ACT Total Scores 12/16/2016   ACT TOTAL SCORE (Goal Greater than or Equal to 20) 15   In the past 12 months, how many times did you visit the emergency room for your asthma without being admitted to the hospital? 1   In the past 12 months, how many times were you hospitalized overnight because of your asthma? 0      1. Is Asthma diagnosis on the Problem List? Yes    2. Is Asthma listed on Health Maintenance? Yes    3. Patient is due for:  ACT      Composite cancer screening  Chart review shows that this patient is due/due soon for the following None  Summary:    Patient is due/failing the following:   ACT    Action needed:   Patient needs to do ACT.    Type of outreach:    Sent [employer] message.    Questions for provider review:    None                                                                                                                                    María Elena Alejandro/NABEEL Wilson---[facility]       Chart routed to Care Team.    María Elena Alejandro/NABEEL Wilson---[facility]

## 2017-04-17 ENCOUNTER — THERAPY VISIT (OUTPATIENT)
Dept: OCCUPATIONAL THERAPY | Facility: CLINIC | Age: 47
End: 2017-04-17
Payer: OTHER MISCELLANEOUS

## 2017-04-17 DIAGNOSIS — M25.531 RIGHT WRIST PAIN: ICD-10-CM

## 2017-04-17 PROCEDURE — 97112 NEUROMUSCULAR REEDUCATION: CPT | Mod: GO | Performed by: OCCUPATIONAL THERAPIST

## 2017-04-17 PROCEDURE — 97140 MANUAL THERAPY 1/> REGIONS: CPT | Mod: GO | Performed by: OCCUPATIONAL THERAPIST

## 2017-04-20 ENCOUNTER — OFFICE VISIT (OUTPATIENT)
Dept: CARDIOLOGY | Facility: CLINIC | Age: 47
End: 2017-04-20
Attending: INTERNAL MEDICINE
Payer: COMMERCIAL

## 2017-04-20 VITALS
SYSTOLIC BLOOD PRESSURE: 120 MMHG | BODY MASS INDEX: 39.27 KG/M2 | HEART RATE: 84 BPM | DIASTOLIC BLOOD PRESSURE: 70 MMHG | OXYGEN SATURATION: 98 % | WEIGHT: 208 LBS | HEIGHT: 61 IN

## 2017-04-20 DIAGNOSIS — I35.1 MODERATE AORTIC REGURGITATION: ICD-10-CM

## 2017-04-20 PROCEDURE — 99214 OFFICE O/P EST MOD 30 MIN: CPT | Performed by: INTERNAL MEDICINE

## 2017-04-20 NOTE — LETTER
4/20/2017    Ramona Ann Aaseby-Aguilera, PA-C  [facility]   [address]  [facility] [address]    RE: Jessie Seymour       Dear Colleague,    I had the pleasure of seeing Jessie Seymour in the [facility].    Jessie Seymour, a 46-year-old woman with mild to moderate aortic insufficiency, cigarette smoking history, obesity, dyslipidemia, alpha thalassemia trait and recent hysterectomy was seen today at your request for followup.      The patient has a history of aortic insufficiency documented on echocardiography since 10/2009.  Her most recent transthoracic echo in 08/2015 showed a normal ejection fraction of 60% with mild to moderate central aortic insufficiency.  The aortic root size was normal.  The valve was trileaflet.  End-systolic and end-diastolic volumes were normal.  The patient had been initially followed by Dr. Arreguin and then was scheduled to follow up with Dr. Jama.  For unclear reasons, the patient did not see Dr. Jama but was again scheduled with me today.      Since I last saw her, the patient underwent a hysterectomy.  The procedure was converted from an endoscopic procedure to an open procedure because of difficulty with adhesions.  The surgery was successfully completed.      She has had a negative breast biopsy for papilloma.  She has a history of chronic asthma which is stable at this time.      Since I last saw her, the patient denies worsening shortness of breath, edema, chest discomfort or syncope.  She remains concerned about her risk for cardiac disease in that she does have a family history of long QT syndrome.  All of her QTs have been normal on previous EKGs and there has been no history of syncope.      PAST MEDICAL HISTORY:   1.  Moderate aortic insufficiency - followup visit has been scheduled with Dr. Jama   2.  Obesity.   3.  Status post hysterectomy.   4.  History of benign breast.   5.  Asthma.   6.  Alpha thalassemia  trait.   7.  Cigarette smoking history.      The patient told me that she still smokes cigarettes but would like to stop.  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      PHYSICAL EXAMINATION:   GENERAL:  Exam today demonstrates a very intelligent, cooperative 46-year-old woman.   VITAL SIGNS:  Her height is 1.5 meters.  Her weight is 94.3 kg.  Her BMI is 39.38.  Her heart rate is 84, blood pressure is 120/70.   LUNGS:  Clear to percussion and auscultation.   CARDIOVASCULAR:  Shows a normal S1 with a normal S2.  There is a soft 1/6 systolic murmur.  I do not detect a diastolic murmur.  Her pulses are symmetrical.  She has minimal to trace edema in her ankles.   ABDOMEN:  She demonstrates obesity.  I cannot palpate any internal organs.      LABORATORY STUDIES:  Her most recent lipid profile in March showed a cholesterol of 151 with an HDL 35, LDL of 94.  Her triglycerides are 110.  Her potassium is 4.0, creatinine 0.82.  Her most recent hemoglobin was 12.1.  Liver function studies are normal.      ASSESSMENT:  Ms. Seymour has a history of chronic mild to moderate aortic insufficiency.  A repeat echo at this point and in another 2 years would be reasonable as long as she remains unchanged.  If her next echo is stable, it may not be needed to repeat the echo so frequently.      I have again reviewed the importance of cessation of all tobacco use and weight loss  for reducing her future risk of adverse vascular events.  According to the ACC risk calculator, she would not benefit from  a statin drug at this time.  I believe the focus at this time should be on weight loss, continued exercise program and avoidance of tobacco.      RECOMMENDATIONS:   1.  Repeat echocardiogram to make certain there has been no change in the degree of aortic insufficiency, cardiac chamber  volumes and left ventricular systolic performance.   2.  Cessation of all tobacco use.   3.  Continue  regular exercise program.   4.  Mediterranean-style weight loss diet.   5.  Followup visit will be scheduled with Dr. Jama with an echo at that time in 2 years.      We have appreciated the opportunity to be involved in the care of your patient, Jessie Seymour.     Sincerely,    Sandeep Bhagat MD     [facility]

## 2017-04-20 NOTE — MR AVS SNAPSHOT
After Visit Summary   4/20/2017    Jessie Seymour    MRN: [id number]           Patient Information     Date Of Birth          1970        Visit Information        Provider Department      4/20/2017 9:00 AM Sandeep Bhagat MD [facility] AT [place]        Today's Diagnoses     Moderate aortic regurgitation           Follow-ups after your visit        Your next 10 appointments already scheduled     Apr 24, 2017  8:00 AM CDT   [facility] Hand with Lovely Cerda   [facility] [place] [facility] ([facility] [place]  )    [address]  [address]  [facility] [address]   [phone]            May 01, 2017  8:00 AM CDT   [facility] Hand with Lovelyryan Philippender   [facility] [place] [facility] ([facility] [place]  )    [address]  [address]  [facility] [address]   [phone]            May 08, 2017  8:00 AM CDT   JOSE MARTIN Hand with Lovely Theodoremingnder   [facility] [place] [facility] ([facility] [place]  )    [address]  [address]  [facility] [address]   [phone]            May 15, 2017  3:00 PM CDT   Ech Complete with [address] ([facility])    [address]  [facility] [id number] [phone]           1.  Please bring or wear a comfortable two-piece outfit. 2.  You may eat, drink and take your normal medicines. 3.  For any questions that cannot be answered, please contact the ordering physician ***Please check-in at the [facility] located in [address] in the [facility] building. When you are finished registering, please go to [address] and have a seat. The technician will call your name for the test.              Future tests that were ordered for you today     Open Future Orders        Priority Expected Expires Ordered    Echocardiogram Routine 4/20/2019 5/10/2019 4/20/2017    Echocardiogram Routine 4/27/2017 4/20/2018 4/20/2017            Who to contact     If you have questions or need  "follow up information about today's clinic visit or your schedule please contact [facility] [place] directly at [phone].  Normal or non-critical lab and imaging results will be communicated to you by MyChart, letter or phone within 4 business days after the clinic has received the results. If you do not hear from us within 7 days, please contact the clinic through [employer] or phone. If you have a critical or abnormal lab result, we will notify you by phone as soon as possible.  Submit refill requests through [employer] or call your pharmacy and they will forward the refill request to us. Please allow 3 business days for your refill to be completed.          Additional Information About Your Visit        [employer] Information     [employer] gives you secure access to your electronic health record. If you see a primary care provider, you can also send messages to your care team and make appointments. If you have questions, please call your primary care clinic.  If you do not have a primary care provider, please call [phone] and they will assist you.        Care EveryWhere ID     This is your Care EveryWhere ID. This could be used by other organizations to access your [facility] medical records  [id number]        Your Vitals Were     Pulse Height Last Period Pulse Oximetry BMI (Body Mass Index)       84 1.549 m (5' 1\") 06/22/2016 (Exact Date) 98% 39.3 kg/m2        Blood Pressure from Last 3 Encounters:   04/20/17 120/70   03/14/17 124/77   03/07/17 122/72    Weight from Last 3 Encounters:   04/20/17 94.3 kg (208 lb)   03/14/17 91.6 kg (202 lb)   03/07/17 91.6 kg (202 lb)              We Performed the Following     Follow-Up with Cardiologist          Today's Medication Changes          These changes are accurate as of: 4/20/17  9:59 AM.  If you have any questions, ask your nurse or doctor.               These medicines have changed or have updated prescriptions.        Dose/Directions "    fluticasone 50 MCG/ACT spray   Commonly known as:  FLONASE   This may have changed:    - when to take this  - reasons to take this   Used for:  Seasonal allergic rhinitis        Dose:  1-2 spray   Spray 1-2 sprays into both nostrils daily   Quantity:  1 g   Refills:  11                Primary Care Provider Office Phone # Fax #    Areli Ann Aaseby-Aguilera, PA-C [phone]       [facility] [address]  [facility] [address]        Thank you!     Thank you for choosing [facility] [place]  for your care. Our goal is always to provide you with excellent care. Hearing back from our patients is one way we can continue to improve our services. Please take a few minutes to complete the written survey that you may receive in the mail after your visit with us. Thank you!             Your Updated Medication List - Protect others around you: Learn how to safely use, store and throw away your medicines at www.disposemymeds.org.          This list is accurate as of: 4/20/17  9:59 AM.  Always use your most recent med list.                   Brand Name Dispense Instructions for use    * albuterol (2.5 MG/3ML) 0.083% neb solution     3 Box    Take 1 vial (2.5 mg) by nebulization every 6 hours as needed for shortness of breath / dyspnea       * albuterol 108 (90 BASE) MCG/ACT Inhaler    albuterol    3 Inhaler    Inhale 2 puffs into the lungs 4 times daily as needed for shortness of breath / dyspnea       aspirin 81 MG tablet      Take 81 mg by mouth daily       camphor-menthol 0.5-0.5 % Lotn    SARNA    59 mL    Apply 1 mL topically every 6 hours as needed for skin care Apply to palms of hands 2-3 times daily       diphenhydrAMINE 25 MG tablet    BENADRYL    60 tablet    Take 1-2 tablets (25-50 mg) by mouth every 6 hours as needed for itching or allergies       fluticasone 50 MCG/ACT spray    FLONASE    1 g    Spray 1-2 sprays into both nostrils daily        hydrocortisone valerate 0.2 % ointment    WEST-LESLY    60 g    Apply sparingly to affected area three times daily as needed.       ibuprofen 600 MG tablet    ADVIL/MOTRIN    90 tablet    Take 1 tablet (600 mg) by mouth every 6 hours as needed for moderate pain       ketorolac 10 MG tablet    TORADOL    4 tablet    Take 1 tablet (10 mg) by mouth every 6 hours as needed for pain       nortriptyline 10 MG capsule    PAMELOR    90 capsule    Using 10 mg once every 3 days       order for DME     1 Units    Please dispense one neb machine Use as directed Wanda Hines MD       order for DME     1 Package    Equipment being ordered: wrist splint       PREDNISONE PO      Take 20 mg by mouth 2 times daily       SUDAFED PO      Take 30 mg by mouth daily as needed for congestion       * Notice:  This list has 2 medication(s) that are the same as other medications prescribed for you. Read the directions carefully, and ask your doctor or other care provider to review them with you.
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
## 2017-04-21 NOTE — PROGRESS NOTES
HISTORY OF PRESENT ILLNESS:  Jessie Seymour, a 46-year-old woman with mild to moderate aortic insufficiency, cigarette smoking history, obesity, dyslipidemia, alpha thalassemia trait and recent hysterectomy was seen today at your request for followup.      The patient has a history of aortic insufficiency documented on echocardiography since 10/2009.  Her most recent transthoracic echo in 08/2015 showed a normal ejection fraction of 60% with mild to moderate central aortic insufficiency.  The aortic root size was normal.  The valve was trileaflet.  End-systolic and end-diastolic volumes were normal.  The patient had been initially followed by Dr. Arreguin and then was scheduled to follow up with Dr. Jama.  For unclear reasons, the patient did not see Dr. Jama but was again scheduled with me today.      Since I last saw her, the patient underwent a hysterectomy.  The procedure was converted from an endoscopic procedure to an open procedure because of difficulty with adhesions.  The surgery was successfully completed.      She has had a negative breast biopsy for papilloma.  She has a history of chronic asthma which is stable at this time.      Since I last saw her, the patient denies worsening shortness of breath, edema, chest discomfort or syncope.  She remains concerned about her risk for cardiac disease in that she does have a family history of long QT syndrome.  All of her QTs have been normal on previous EKGs and there has been no history of syncope.      PAST MEDICAL HISTORY:   1.  Moderate aortic insufficiency - followup visit has been scheduled with Dr. Jama   2.  Obesity.   3.  Status post hysterectomy.   4.  History of benign breast.   5.  Asthma.   6.  Alpha thalassemia trait.   7.  Cigarette smoking history.      The patient told me that she still smokes cigarettes but would like to stop.  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      PHYSICAL  EXAMINATION:   GENERAL:  Exam today demonstrates a very intelligent, cooperative 46-year-old woman.   VITAL SIGNS:  Her height is 1.5 meters.  Her weight is 94.3 kg.  Her BMI is 39.38.  Her heart rate is 84, blood pressure is 120/70.   LUNGS:  Clear to percussion and auscultation.   CARDIOVASCULAR:  Shows a normal S1 with a normal S2.  There is a soft 1/6 systolic murmur.  I do not detect a diastolic murmur.  Her pulses are symmetrical.  She has minimal to trace edema in her ankles.   ABDOMEN:  She demonstrates obesity.  I cannot palpate any internal organs.      LABORATORY STUDIES:  Her most recent lipid profile in March showed a cholesterol of 151 with an HDL 35, LDL of 94.  Her triglycerides are 110.  Her potassium is 4.0, creatinine 0.82.  Her most recent hemoglobin was 12.1.  Liver function studies are normal.      ASSESSMENT:  Ms. Seymour has a history of chronic mild to moderate aortic insufficiency.  A repeat echo at this point and in another 2 years would be reasonable as long as she remains unchanged.  If her next echo is stable, it may not be needed to repeat the echo so frequently.      I have again reviewed the importance of cessation of all tobacco use and weight loss  for reducing her future risk of adverse vascular events.  According to the ACC risk calculator, she would not benefit from  a statin drug at this time.  I believe the focus at this time should be on weight loss, continued exercise program and avoidance of tobacco.      RECOMMENDATIONS:   1.  Repeat echocardiogram to make certain there has been no change in the degree of aortic insufficiency, cardiac chamber  volumes and left ventricular systolic performance.   2.  Cessation of all tobacco use.   3.  Continue regular exercise program.   4.  Mediterranean-style weight loss diet.   5.  Followup visit will be scheduled with Dr. Jama with an echo at that time in 2 years.      We have appreciated the opportunity to be involved in the care of  your patient, Caitie Seymour.      Sandeep Bhagat MD      cc:   Ramona Aaseby-Aguilera, [place] [address]         SANDEEP BHAGAT MD             D: 2017 11:28   T: 2017 07:17   MT: LIDIA      Name:     CAITIE SEYMOUR   MRN:      [id number]        Account:      [id number]   :      1970           Service Date: 2017      Document: [id number]

## 2017-04-24 ENCOUNTER — THERAPY VISIT (OUTPATIENT)
Dept: OCCUPATIONAL THERAPY | Facility: CLINIC | Age: 47
End: 2017-04-24
Payer: OTHER MISCELLANEOUS

## 2017-04-24 DIAGNOSIS — M25.531 RIGHT WRIST PAIN: ICD-10-CM

## 2017-04-24 PROCEDURE — 97112 NEUROMUSCULAR REEDUCATION: CPT | Mod: GO | Performed by: OCCUPATIONAL THERAPIST

## 2017-05-01 ENCOUNTER — THERAPY VISIT (OUTPATIENT)
Dept: OCCUPATIONAL THERAPY | Facility: CLINIC | Age: 47
End: 2017-05-01
Payer: OTHER MISCELLANEOUS

## 2017-05-01 DIAGNOSIS — M25.531 RIGHT WRIST PAIN: ICD-10-CM

## 2017-05-01 PROCEDURE — 97112 NEUROMUSCULAR REEDUCATION: CPT | Mod: GO | Performed by: OCCUPATIONAL THERAPIST

## 2017-05-08 ENCOUNTER — THERAPY VISIT (OUTPATIENT)
Dept: OCCUPATIONAL THERAPY | Facility: CLINIC | Age: 47
End: 2017-05-08
Payer: OTHER MISCELLANEOUS

## 2017-05-08 DIAGNOSIS — M25.531 RIGHT WRIST PAIN: ICD-10-CM

## 2017-05-08 PROCEDURE — 97112 NEUROMUSCULAR REEDUCATION: CPT | Mod: GO | Performed by: OCCUPATIONAL THERAPIST

## 2017-05-15 ENCOUNTER — THERAPY VISIT (OUTPATIENT)
Dept: OCCUPATIONAL THERAPY | Facility: CLINIC | Age: 47
End: 2017-05-15
Payer: OTHER MISCELLANEOUS

## 2017-05-15 ENCOUNTER — TELEPHONE (OUTPATIENT)
Dept: OCCUPATIONAL THERAPY | Facility: CLINIC | Age: 47
End: 2017-05-15

## 2017-05-15 DIAGNOSIS — M25.531 RIGHT WRIST PAIN: ICD-10-CM

## 2017-05-15 PROCEDURE — 97140 MANUAL THERAPY 1/> REGIONS: CPT | Mod: GO | Performed by: OCCUPATIONAL THERAPIST

## 2017-05-15 PROCEDURE — 97110 THERAPEUTIC EXERCISES: CPT | Mod: GO | Performed by: OCCUPATIONAL THERAPIST

## 2017-05-15 NOTE — PROGRESS NOTES
Hand Therapy Progress Note  Current Date:  5/15/17  Reporting Period: 3/20/17-5/15/17    Diagnosis: R wrist sprain  Onset:  2/28/17  MD order:  3/14/17    Subjective  S:  Subjective changes as noted by patient: Things are a little bit better.  I still get the numbness and tingling.  I haven't worn the brace.     Functional changes noted by patient: I can't lift a gallon of milk.     Response to previous treatment:  It felt a little sore.  Patient has noted adverse reaction to:   None        Upper Extremity Functional Index Score:  SCORE:   Column Totals: /80: 47   (A lower score indicates greater disability.)    Objective  Pain Level Report  VAS(0-10) 3/20/2017 5/15/17   At Rest: 2/10 2/10   At Worst: 4/10 4/10     Report of Pain:  Location:  Ulnar border of FA/wrist, pt stating thumb pain d/t splint use  Pain Quality:  Dull, Sharp and Throbbing, tingly   Frequency: constant    Pain is worst:  Worse with use  Exacerbated by: general use of hand  Relieved by:  cold, heat and rest. Use of tylenol, exercises    Edema:  MILD  Sensation: Tingling in RF, SF, and thumb    ROM:  Wrist  3/20/2017 5/15/17   AROM(PROM) Left Right R   Extension 70 55 50   Flexion 63 55 40   RD 9 3 15   UD 40 32 23     Tenderness: Pain level report on scale 0-10/10  Date 3/20/2017 5/15/17   Side Right R   Ulna Styloid 0 NT   TFCC 4 3   Distal Radius 0 NT   Radial Styloid 0 NT   DRUJ 2 3   Volar Scaphoid 1 2   Dorsal Scaphoid 1 1   Volar Lunate 4 2   Dorsal Lunate 2 2     Special Tests  Ulnar Dorsal Zone: (+ for hypermobile or - for hypomobile) Pain 0-10/10  Date 3/20/2017 5/15/17   Side Right R   Radiocarpal P/S (TFCC--stab f/a, rotate with some compression) 4 2   Ballottement II P/S (TFCC--stab hand/wrist, pt p/s) 4 2   TFCC load Test (UD, axially load wrist c volar/dorsal mvmt) Hyper  4 2   UMTDG test (TFCC--approximate the pisotriquetral and ulna) 5 4   Lunotriquetral Sheer Test nt NT   Piano Key Sign (DRUJ) nt NT   Piano Key Test  (DRUJ--distal ulna moved in pro/sup) nt NT   Ballottement I: E/F (DRUJ--stabilize hand/wrist, pt e/f of elbow) nt NT   Volar RU Ligament Shift (DRUJ--stab ulna and wrist, push radius volarly) nt NT   Dorsal RU Ligament Shift (DRUJ--stab ulna and wrist, push radius dorsally) nt NT     Strength: (Measured in pounds, pain scale 0-10/10)    Date 3/20/2017 5/15/17       Trials Left Right Left Right Left Right Left Right Left Right Left Right   1 64 20 60 21           2               3               Avg               Pain  4  3             Assessment:  Response to therapy has been improvement to:  ROM of Wrist:  Radial Deviation  Pain:  less tender over affected area  Response to therapy has been lack of progress in:  ROM of Wrist:  Ext  and Flex  Pain-intensity of pain has not changed  Paresthesias:  Ulnar nerve - continues to have numbness and tingling in ulnar nerve distribution    Overall Assessment:  Patient is ready to progress to next level of protocol.  Patient is becoming more independent in home exercise program  Patient would benefit from continued therapy to achieve rehab potential  STG/LTG:  STGoals have been reviewed;  see goal sheet for details and updates.  LTGoals have been reviewed;  see goal sheet for details and updates.    I have re-evaluated this patient and find that the nature, scope, duration and intensity of the therapy is appropriate for the medical condition of the patient.    Plan  Frequency:  1 X week, once daily  Duration:  for 6 weeks    Treatment Plan:  Modalities:  US and Iontophoresis  Therapeutic Exercise:  AROM, AAROM, PROM, Tendon Gliding, Isotonics and Isometrics  Neuromuscular re-education:  Coordination/Dexterity, Proprioceptive Training, Posture and Kinesiotaping  Manual Techniques:  Joint mobilization, Myofascial release and Manual edema mobilization  Orthotic Fabrication:  Static orthosis  Self Care:  Self Care Tasks, Ergonomic Considerations, Work Tasks and Education      Discharge Plan:  Achieve all LTG.  Independent in home treatment program.  Return to work with or without restrictions.  Reach maximal therapeutic benefit.    Home Exercise Program:  Gentle use of R hand  R circumferential splint-full time-off for hygiene    Next Visit:  US  MFR  Ulnar nerve glide  Wrist gyro

## 2017-05-18 NOTE — TELEPHONE ENCOUNTER
Cathleen called and requesting the most recent progress note prior to authorizing more visits.  PN was sent today.

## 2017-06-09 PROBLEM — M25.531 RIGHT WRIST PAIN: Status: RESOLVED | Noted: 2017-03-20 | Resolved: 2017-06-09

## 2017-06-09 NOTE — PROGRESS NOTES
Pt has not returned for therapy since 5/15/17.  Assume all goals are met to pt satisfaction.  D/C [facility].

## 2017-06-14 ENCOUNTER — CARE COORDINATION (OUTPATIENT)
Dept: CARE COORDINATION | Facility: CLINIC | Age: 47
End: 2017-06-14

## 2017-06-14 NOTE — PROGRESS NOTES
Clinic Care Coordination Contact  Clinic Care Coordination Chart Review     Situation: Patient is listed on the IHP report provided by the [place] department of Human services and is identified as a high risk patient. Chart reviewed by social work care coordinator.    Background: CCSW review chart for appropriateness of care coordination and to assess needs:    Universal Utilization:   ED Visits in last year: 0  Hospital visits in last year: 1  Last PCP appointment: 12/15/17  Need for follow up? Not indicated  Missed Appointments: 15  Concerns:  (na)  Multiple Providers or Specialists: yes  Need for follow up?   Cardiologist - last OV 4/20. F/u indicated in 2 years  OB - Dr. Cummings, no follow up indicated    Health Maintenance up to date:   Health Maintenance Due   Topic Date Due     ASTHMA CONTROL TEST Q6 MOS  06/15/2017     Insurance: [employer] [place]    Plan/Recommendations: CCSW will outreach to pt and introduce role. Offer care coordination services.    Gemma Bassett, Social Work Care Coordinator, LG, MSW  [facility]  P:[phone]/ Signed June 14, 2017
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
## 2017-06-14 NOTE — LETTER
June 21, 2017      Jessie L Dawn  [address]  [place] [address]    Dear Jacy,    I am the Social Work Care Coordinator that works with your primary care provider's clinic. I wanted to thank you for spending the time to talk with me.  Below is a description of what Clinic Care Coordination is and how I can further assist you.     My role as the care coordinator  is to help you manage your health and improve access to the [facility] system in the most efficient manner.I can assist you with community resources (Housing, transportation, employment, financial resources, health insurance), behavioral and psychosocial needs, chemical dependency and counseling/psychiatric services.    I am a free and available resource Monday through Friday here at the clinic. Feel free to save my contact information and outreach to me anytime at my direct number  [phone]. We at [facility] are focused on providing you with the highest-quality healthcare experience possible. I look forward to working with you.    Sincerely,    DEVON Spencer, MSW  Social Work Care Coordinator  P:[phone]  [facility]    Enclosed: I have enclosed a copy of a 24 Hour Access Plan. This has helpful phone numbers for you to call when needed. Please keep this in an easy to access place to use as needed.

## 2017-06-21 NOTE — PROGRESS NOTES
Clinic Care Coordination Contact  Care Team Conversations    Called patient and introduced role.    Patient states she had no need for care coordination at this time.   Declines services.    PLAN:  CCSW will mail out contact information and access plan  Pt will outreach as needed  CCSW will not continue to follow    Gemma Bassett, Social Work Care Coordinator, LG, MSW  [facility]: [facility] [place] [facility]   P:[phone]/ Signed June 21, 2017

## 2017-07-12 ENCOUNTER — TELEPHONE (OUTPATIENT)
Dept: FAMILY MEDICINE | Facility: CLINIC | Age: 47
End: 2017-07-12

## 2017-07-12 ENCOUNTER — OFFICE VISIT (OUTPATIENT)
Dept: FAMILY MEDICINE | Facility: CLINIC | Age: 47
End: 2017-07-12

## 2017-07-12 VITALS
WEIGHT: 207 LBS | RESPIRATION RATE: 24 BRPM | DIASTOLIC BLOOD PRESSURE: 82 MMHG | HEART RATE: 83 BPM | BODY MASS INDEX: 39.08 KG/M2 | TEMPERATURE: 98.3 F | SYSTOLIC BLOOD PRESSURE: 128 MMHG | OXYGEN SATURATION: 99 % | HEIGHT: 61 IN

## 2017-07-12 DIAGNOSIS — B37.0 THRUSH: ICD-10-CM

## 2017-07-12 DIAGNOSIS — J45.20 INTERMITTENT ASTHMA, UNCOMPLICATED: Primary | ICD-10-CM

## 2017-07-12 LAB
FEF 25/75: NORMAL
FEV-1: NORMAL
FEV1/FVC: NORMAL
FVC: NORMAL

## 2017-07-12 PROCEDURE — 99214 OFFICE O/P EST MOD 30 MIN: CPT | Mod: 25 | Performed by: NURSE PRACTITIONER

## 2017-07-12 PROCEDURE — 94010 BREATHING CAPACITY TEST: CPT | Performed by: NURSE PRACTITIONER

## 2017-07-12 RX ORDER — IPRATROPIUM BROMIDE AND ALBUTEROL SULFATE 2.5; .5 MG/3ML; MG/3ML
1 SOLUTION RESPIRATORY (INHALATION) EVERY 6 HOURS PRN
Qty: 1 BOX | Refills: 0 | Status: SHIPPED | OUTPATIENT
Start: 2017-07-12 | End: 2021-07-07

## 2017-07-12 NOTE — NURSING NOTE
"Chief Complaint   Patient presents with     Asthma     flare up       Initial /82 (BP Location: Right arm, Patient Position: Chair, Cuff Size: Adult Large)  Pulse 83  Temp 98.3  F (36.8  C) (Oral)  Ht 5' 1\" (1.549 m)  Wt 207 lb (93.9 kg)  LMP 06/22/2016 (Exact Date)  SpO2 99%  BMI 39.11 kg/m2 Estimated body mass index is 39.11 kg/(m^2) as calculated from the following:    Height as of this encounter: 5' 1\" (1.549 m).    Weight as of this encounter: 207 lb (93.9 kg).  Medication Reconciliation: complete     Jesus Alberto Brown CMA          "
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
## 2017-07-12 NOTE — LETTER
[facility]   [address] [place] [address]  [phone]      July 12, 2017    RE:Jessie Seymour  [address]   [place] [address]    1970                To whom it may concern:    Jessie Seymour is under my professional care for asthma with acute exacerbation. She was seen in clinic and given a duoneb and medications.  She  may return to work on or about 7/13/2017.      Sincerely,      Bailey Lewis NP

## 2017-07-12 NOTE — TELEPHONE ENCOUNTER
"Pt calling demanding that she be allowed to walk into clinic to get a duoneb.  \"This was a plan set in place by Dr Pena and passed to Dr Hines that I can walk in anytime I want to get a duoneb when I can't control my asthma!\"  Explained that this is not proper protocol and since Dr Hines is not here another provider is not going to authorized that with out her being assessed.  Pt became very upset and treated writer rudely \"You nurses are always changing over so off course you don't know what you are doing!\"  \"Jesus Alberto knows all about this plan since he was there when the clinic was small.  Ask him or the other parviz!\"    Pt demanded to talk to clinic manager RIGHT NOW but writer could not get ahold of anyone.  Pt was informed she could leave a message but told writer she is not going to wait and demanded to speak to someone right now.  Writer asked patient to stop talking to her like that and patient started in again.  Pt was transferred to patient care supervisor.    Pt is scheduled at 11:30 today    Routed to patient care supervisor and provider seeing patient today    Tori Del Valle RN, BSN    "

## 2017-07-12 NOTE — MR AVS SNAPSHOT
"              After Visit Summary   7/12/2017    Jessie Seymour    MRN: [id number]           Patient Information     Date Of Birth          1970        Visit Information        Provider Department      7/12/2017 11:30 AM Bailey Lewis NP [facility]        Today's Diagnoses     Intermittent asthma, uncomplicated    -  1    Thrush           Follow-ups after your visit        Who to contact     If you have questions or need follow up information about today's clinic visit or your schedule please contact [facility] directly at [phone].  Normal or non-critical lab and imaging results will be communicated to you by [employer], letter or phone within 4 business days after the clinic has received the results. If you do not hear from us within 7 days, please contact the clinic through [employer] or phone. If you have a critical or abnormal lab result, we will notify you by phone as soon as possible.  Submit refill requests through [employer] or call your pharmacy and they will forward the refill request to us. Please allow 3 business days for your refill to be completed.          Additional Information About Your Visit        MyChart Information     [employer] gives you secure access to your electronic health record. If you see a primary care provider, you can also send messages to your care team and make appointments. If you have questions, please call your primary care clinic.  If you do not have a primary care provider, please call [phone] and they will assist you.        Care EveryWhere ID     This is your Care EveryWhere ID. This could be used by other organizations to access your [place] medical records  [id number]        Your Vitals Were     Pulse Temperature Respirations Height Last Period Pulse Oximetry    83 98.3  F (36.8  C) (Oral) 24 5' 1\" (1.549 m) 06/22/2016 (Exact Date) 99%    Breastfeeding? BMI (Body Mass Index)                No 39.11 kg/m2           Blood Pressure " from Last 3 Encounters:   07/12/17 128/82   04/20/17 120/70   03/14/17 124/77    Weight from Last 3 Encounters:   07/12/17 207 lb (93.9 kg)   04/20/17 208 lb (94.3 kg)   03/14/17 202 lb (91.6 kg)              We Performed the Following     Spirometry, Breathing Capacity: Normal Order, Clinic Performed          Today's Medication Changes          These changes are accurate as of: 7/12/17 12:07 PM.  If you have any questions, ask your nurse or doctor.               Start taking these medicines.        Dose/Directions    FIRST-PERI MOUTHWASH Susp   Used for:  Thrush   Started by:  Bailey Lewis NP        Dose:  5-10 mL   Swish and swallow 5-10 mLs in mouth every 6 hours as needed   Quantity:  237 mL   Refills:  1       ipratropium - albuterol 0.5 mg/2.5 mg/3 mL 0.5-2.5 (3) MG/3ML neb solution   Commonly known as:  DUONEB   Used for:  Intermittent asthma, uncomplicated   Started by:  Bailey Lewis NP        Dose:  1 vial   Take 1 vial (3 mLs) by nebulization every 6 hours as needed for shortness of breath / dyspnea or wheezing   Quantity:  1 Box   Refills:  0         These medicines have changed or have updated prescriptions.        Dose/Directions    fluticasone 50 MCG/ACT spray   Commonly known as:  FLONASE   This may have changed:    - when to take this  - reasons to take this   Used for:  Seasonal allergic rhinitis        Dose:  1-2 spray   Spray 1-2 sprays into both nostrils daily   Quantity:  1 g   Refills:  11            Where to get your medicines      These medications were sent to [id number] [place], [place] - [address] [place] [address] [address] [address]     Phone:  [phone]     FIRST-PERI MOUTHWASH Susp    ipratropium - albuterol 0.5 mg/2.5 mg/3 mL 0.5-2.5 (3) MG/3ML neb solution                Primary Care Provider Office Phone # Fax #    Ramona Ann Aaseby-Aguilera, PA-C [phone]       [facility] [address]  [facility] [address]         Equal Access to Services     [facility]: Hadii onesimo willams liam Chance, waaxda luqadaha, qaybta kaalsalvador michael, annie xiongthusyl milner. So [facility] [phone].    ATENCIÓN: Si ryanla compa, tiene a mendez disposición servicios gratuitos de asistencia lingüística. Teresa al [phone].    We comply with applicable federal civil rights laws and [place] laws. We do not discriminate on the basis of race, color, national origin, age, disability sex, sexual orientation or gender identity.            Thank you!     Thank you for choosing [facility]  for your care. Our goal is always to provide you with excellent care. Hearing back from our patients is one way we can continue to improve our services. Please take a few minutes to complete the written survey that you may receive in the mail after your visit with us. Thank you!             Your Updated Medication List - Protect others around you: Learn how to safely use, store and throw away your medicines at www.disposemymeds.org.          This list is accurate as of: 7/12/17 12:07 PM.  Always use your most recent med list.                   Brand Name Dispense Instructions for use Diagnosis    * albuterol (2.5 MG/3ML) 0.083% neb solution     3 Box    Take 1 vial (2.5 mg) by nebulization every 6 hours as needed for shortness of breath / dyspnea    Intermittent asthma       * albuterol 108 (90 BASE) MCG/ACT Inhaler    albuterol    3 Inhaler    Inhale 2 puffs into the lungs 4 times daily as needed for shortness of breath / dyspnea    Intermittent asthma, uncomplicated       aspirin 81 MG tablet      Take 81 mg by mouth daily        camphor-menthol 0.5-0.5 % Lotn    SARNA    59 mL    Apply 1 mL topically every 6 hours as needed for skin care Apply to palms of hands 2-3 times daily    Itching       diphenhydrAMINE 25 MG tablet    BENADRYL    60 tablet    Take 1-2 tablets (25-50 mg) by mouth every 6 hours as needed for itching or  allergies    S/P hysterectomy       FIRST-PERI MOUTHWASH Susp     237 mL    Swish and swallow 5-10 mLs in mouth every 6 hours as needed    Thrush       fluticasone 50 MCG/ACT spray    FLONASE    1 g    Spray 1-2 sprays into both nostrils daily    Seasonal allergic rhinitis       hydrocortisone valerate 0.2 % ointment    WEST-LESLY    60 g    Apply sparingly to affected area three times daily as needed.    Rash       ibuprofen 600 MG tablet    ADVIL/MOTRIN    90 tablet    Take 1 tablet (600 mg) by mouth every 6 hours as needed for moderate pain    S/P hysterectomy       ipratropium - albuterol 0.5 mg/2.5 mg/3 mL 0.5-2.5 (3) MG/3ML neb solution    DUONEB    1 Box    Take 1 vial (3 mLs) by nebulization every 6 hours as needed for shortness of breath / dyspnea or wheezing    Intermittent asthma, uncomplicated       ketorolac 10 MG tablet    TORADOL    4 tablet    Take 1 tablet (10 mg) by mouth every 6 hours as needed for pain    Migraine without aura and without status migrainosus, not intractable       nortriptyline 10 MG capsule    PAMELOR    90 capsule    Using 10 mg once every 3 days    Headache(784.0)       order for DME     1 Units    Please dispense one neb machine Use as directed Wanda Hines MD    Intermittent asthma       order for DME     1 Package    Equipment being ordered: wrist splint    Right wrist sprain, initial encounter       PREDNISONE PO      Take 20 mg by mouth 2 times daily        SUDAFED PO      Take 30 mg by mouth daily as needed for congestion        * Notice:  This list has 2 medication(s) that are the same as other medications prescribed for you. Read the directions carefully, and ask your doctor or other care provider to review them with you.

## 2017-07-13 ASSESSMENT — ASTHMA QUESTIONNAIRES: ACT_TOTALSCORE: 9

## 2017-08-17 ENCOUNTER — TELEPHONE (OUTPATIENT)
Dept: FAMILY MEDICINE | Facility: CLINIC | Age: 47
End: 2017-08-17

## 2017-08-17 DIAGNOSIS — J01.90 ACUTE SINUSITIS WITH SYMPTOMS > 10 DAYS: Primary | ICD-10-CM

## 2017-08-17 RX ORDER — AMOXICILLIN 875 MG
875 TABLET ORAL 2 TIMES DAILY
Qty: 20 TABLET | Refills: 0 | Status: SHIPPED | OUTPATIENT
Start: 2017-08-17 | End: 2018-12-04

## 2017-08-17 NOTE — TELEPHONE ENCOUNTER
"Spoke with patient and she states she doesn't have insurance at this time and wants a telephone visit right now.  Informed patient that Areli is already gone for the day and there are not openings left except UC.  Checked the CR clinic as well and OX per patient demand.  There are not openings since it is so late in the day.  Pt demanded that writer check with a provider in clinic.  \"I know someone is there until five so just ask them to double book me.\"  Writer ask acute care provider if he would be willing to do a telephone visit since the only other provider in clinic was in with a family of well child visits.  Acute provider requested symptoms to review and will decide if treatment is appropriate.        Face swollen under eyes around nose, tender, yellow mucous, no fever, and no respiratory wise.  Symptoms started around 8am this morning.  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  Per patient she has a history of sinus infections and does has asthma.  \"I want to get ahead of this before it gets into my chest.\"    Best callback: [phone]  "

## 2017-08-17 NOTE — PATIENT INSTRUCTIONS
Because of her particular situation we decided to treat her with medication over the phone. We did have her understand that this is not something that we can consistently do.     Antibiotics were sent to the pharmacy high-dose amoxicillin. We took into account the particular type of infection that we may be treating as well as the cost of the medication.    If this is not sufficient or her symptoms seem to return she should be seen by her primary care. It would not be appropriate at that time to continue treatment over the phone without being able to examine the patient

## 2017-08-17 NOTE — TELEPHONE ENCOUNTER
Reason for call:  Symptom   Symptom or request: Sinus Pain    Have you been treated for this before? Yes    Additional comments: pt requesting zpac, states she gets sinus infections all the time. She would prefer a phone visit if needed, she is currently uninsured.      Phone number to reach patient:  Cell number on file:    Telephone Information:   Mobile [phone]       Best Time:  any    Can we leave a detailed message on this number?  YES     Slava Bashir   08/17/17 4:11 PM

## 2017-10-03 ENCOUNTER — TELEPHONE (OUTPATIENT)
Dept: FAMILY MEDICINE | Facility: CLINIC | Age: 47
End: 2017-10-03

## 2017-10-03 NOTE — LETTER
[address] [id number] [phone]  October 3, 2017    Jessie Seymour  [address]   [place] [address]    Dear Jessie,    I care about your health and have reviewed your health plan. I have reviewed your medical conditions, medication list, and lab results and am making recommendations based on this review, to better manage your health.    You are in particular need of attention regarding:  -Asthma    I am recommending that you:  {recommendations: -Complete and return the attached ASTHMA CONTROL TEST.  If your total score is 19 or less or you have been to the ER or urgent care for your asthma, then please schedule an asthma followup appointment.    Here is a list of Health Maintenance topics that are due now or due soon:  Health Maintenance Due   Topic Date Due     INFLUENZA VACCINE (SYSTEM ASSIGNED)  09/01/2017       Please call us at [phone] (or use [employer]) to address the above recommendations.     Thank you for trusting [facility] and we appreciate the opportunity to serve you.  We look forward to supporting your healthcare needs in the future.    Healthy Regards,    Vani Roy MD/jorgito

## 2017-10-03 NOTE — TELEPHONE ENCOUNTER
Panel Management Review      Patient has the following on her problem list:     Asthma review     ACT Total Scores 7/12/2017   ACT TOTAL SCORE -   ASTHMA ER VISITS -   ASTHMA HOSPITALIZATIONS -   ACT TOTAL SCORE (Goal Greater than or Equal to 20) 9   In the past 12 months, how many times did you visit the emergency room for your asthma without being admitted to the hospital? 0   In the past 12 months, how many times were you hospitalized overnight because of your asthma? 0      1. Is Asthma diagnosis on the Problem List? Yes    2. Is Asthma listed on Health Maintenance? Yes    3. Patient is due for:  ACT        Composite cancer screening  Chart review shows that this patient is due/due soon for the following None  Summary:    Patient is due/failing the following:   ACT    Action needed:   Patient needs to do ACT.    Type of outreach:    Copy of ACT mailed to patient, will reach out in 5 days.    Questions for provider review:    None                                                                                                                                    María Elena Alejandro/[facility]---[facility]       Chart routed to Care Team .

## 2018-01-22 ENCOUNTER — TELEPHONE (OUTPATIENT)
Dept: FAMILY MEDICINE | Facility: CLINIC | Age: 48
End: 2018-01-22

## 2018-01-22 NOTE — LETTER
[address] [id number] [phone]  January 23, 2018    Jessie Seymour  [facility]   [facility] [address]    Dear Jessie,    I care about your health and have reviewed your health plan. I have reviewed your medical conditions, medication list, and lab results and am making recommendations based on this review, to better manage your health.    You are in particular need of attention regarding:  -Asthma    I am recommending that you:  {recommendations: -Complete and return the attached ASTHMA CONTROL TEST.  If your total score is 19 or less or you have been to the ER or urgent care for your asthma, then please schedule an asthma followup appointment.    Here is a list of Health Maintenance topics that are due now or due soon:  Health Maintenance Due   Topic Date Due     INFLUENZA VACCINE (SYSTEM ASSIGNED)  09/01/2017     ASTHMA ACTION PLAN Q1 YR  12/15/2017     ASTHMA CONTROL TEST Q6 MOS  01/12/2018       Please call us at [phone] (or use [email or web address]) to address the above recommendations.     Thank you for trusting [facility] and we appreciate the opportunity to serve you.  We look forward to supporting your healthcare needs in the future.    Healthy Regards,    Vani Roy MD/jorgito

## 2018-01-22 NOTE — TELEPHONE ENCOUNTER
Panel Management Review      Patient has the following on her problem list:     Asthma review     ACT Total Scores 7/12/2017   ACT TOTAL SCORE -   ASTHMA ER VISITS -   ASTHMA HOSPITALIZATIONS -   ACT TOTAL SCORE (Goal Greater than or Equal to 20) 9   In the past 12 months, how many times did you visit the emergency room for your asthma without being admitted to the hospital? 0   In the past 12 months, how many times were you hospitalized overnight because of your asthma? 0      1. Is Asthma diagnosis on the Problem List? Yes    2. Is Asthma listed on Health Maintenance? Yes    3. Patient is due for:  ACT        Composite cancer screening  Chart review shows that this patient is due/due soon for the following None  Summary:    Patient is due/failing the following:   ACT    Action needed:   Patient needs to do ACT.    Type of outreach:    Copy of ACT mailed to patient, will reach out in 5 days.    Questions for provider review:    None                                                                                                                                    María Elena Alejandro/[facility]       Chart routed to care team .

## 2018-02-01 ASSESSMENT — ASTHMA QUESTIONNAIRES: ACT_TOTALSCORE: 15

## 2018-05-19 ENCOUNTER — HEALTH MAINTENANCE LETTER (OUTPATIENT)
Age: 48
End: 2018-05-19

## 2018-07-19 ENCOUNTER — OFFICE VISIT (OUTPATIENT)
Dept: URGENT CARE | Facility: URGENT CARE | Age: 48
End: 2018-07-19
Payer: COMMERCIAL

## 2018-07-19 VITALS
HEART RATE: 93 BPM | WEIGHT: 211.1 LBS | BODY MASS INDEX: 39.89 KG/M2 | SYSTOLIC BLOOD PRESSURE: 122 MMHG | TEMPERATURE: 98.1 F | OXYGEN SATURATION: 97 % | DIASTOLIC BLOOD PRESSURE: 76 MMHG

## 2018-07-19 DIAGNOSIS — M54.2 NECK PAIN: ICD-10-CM
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
## 2018-07-19 PROCEDURE — 99214 OFFICE O/P EST MOD 30 MIN: CPT | Performed by: FAMILY MEDICINE

## 2018-07-19 RX ORDER — METHOCARBAMOL 500 MG/1
500 TABLET, FILM COATED ORAL 4 TIMES DAILY PRN
Qty: 30 TABLET | Refills: 0 | Status: SHIPPED | OUTPATIENT
Start: 2018-07-19 | End: 2020-02-04
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
## 2018-12-04 ENCOUNTER — OFFICE VISIT (OUTPATIENT)
Dept: URGENT CARE | Facility: URGENT CARE | Age: 48
End: 2018-12-04
Payer: COMMERCIAL

## 2018-12-04 ENCOUNTER — NURSE TRIAGE (OUTPATIENT)
Dept: NURSING | Facility: CLINIC | Age: 48
End: 2018-12-04

## 2018-12-04 VITALS
BODY MASS INDEX: 37.76 KG/M2 | WEIGHT: 200 LBS | OXYGEN SATURATION: 98 % | HEART RATE: 73 BPM | SYSTOLIC BLOOD PRESSURE: 102 MMHG | DIASTOLIC BLOOD PRESSURE: 60 MMHG | HEIGHT: 61 IN | TEMPERATURE: 98 F

## 2018-12-04 DIAGNOSIS — R30.0 DYSURIA: ICD-10-CM

## 2018-12-04 DIAGNOSIS — G43.809 OTHER MIGRAINE WITHOUT STATUS MIGRAINOSUS, NOT INTRACTABLE: Primary | ICD-10-CM

## 2018-12-04 LAB
ALBUMIN UR-MCNC: NEGATIVE MG/DL
APPEARANCE UR: CLEAR
BILIRUB UR QL STRIP: NEGATIVE
COLOR UR AUTO: YELLOW
GLUCOSE UR STRIP-MCNC: NEGATIVE MG/DL
HGB UR QL STRIP: NEGATIVE
KETONES UR STRIP-MCNC: NEGATIVE MG/DL
LEUKOCYTE ESTERASE UR QL STRIP: NEGATIVE
NITRATE UR QL: NEGATIVE
PH UR STRIP: 8 PH (ref 5–7)
SOURCE: ABNORMAL
SP GR UR STRIP: 1.02 (ref 1–1.03)
UROBILINOGEN UR STRIP-ACNC: 0.2 EU/DL (ref 0.2–1)

## 2018-12-04 PROCEDURE — 96372 THER/PROPH/DIAG INJ SC/IM: CPT | Mod: 59 | Performed by: FAMILY MEDICINE

## 2018-12-04 PROCEDURE — 99214 OFFICE O/P EST MOD 30 MIN: CPT | Mod: 25 | Performed by: FAMILY MEDICINE

## 2018-12-04 PROCEDURE — 81003 URINALYSIS AUTO W/O SCOPE: CPT | Performed by: FAMILY MEDICINE

## 2018-12-04 RX ORDER — METHYLPREDNISOLONE SODIUM SUCCINATE 40 MG/ML
40 INJECTION, POWDER, LYOPHILIZED, FOR SOLUTION INTRAMUSCULAR; INTRAVENOUS ONCE
Qty: 1 ML | Refills: 0
Start: 2018-12-04 | End: 2018-12-04

## 2018-12-04 RX ORDER — OMEPRAZOLE 10 MG/1
10 CAPSULE, DELAYED RELEASE ORAL DAILY PRN
COMMUNITY
Start: 2012-11-28 | End: 2022-07-19

## 2018-12-04 RX ORDER — KETOROLAC TROMETHAMINE 30 MG/ML
30 INJECTION, SOLUTION INTRAMUSCULAR; INTRAVENOUS ONCE
Qty: 1 ML | Refills: 0 | OUTPATIENT
Start: 2018-12-04 | End: 2020-02-04

## 2018-12-04 RX ORDER — PROMETHAZINE HYDROCHLORIDE 25 MG/1
25 TABLET ORAL EVERY 8 HOURS PRN
Qty: 10 TABLET | Refills: 0 | Status: SHIPPED | OUTPATIENT
Start: 2018-12-04 | End: 2020-11-17

## 2018-12-04 NOTE — PATIENT INSTRUCTIONS
Today in clinic you received:    Toradol 30 mg IM x 1 dose    Solumedrol 40 mg IM x 1 dose    When you get home, you should take promethazine (Phenergan) 25 mg by mouth.  You can repeat this dosing every 8 hours as needed.    If your headache is not responding to this treatment within 24 hours, please contact your primary care provider or your neurologist for additional guidance.  If your headache is acutely worsening, please seek care in the ER.

## 2018-12-04 NOTE — LETTER
[facility]  [address]  [address]  [place] [address]  [phone]      December 4, 2018    RE:  Jessie Seymour                                                                                                                                                       To whom it may concern:    Jessie Seymour is under my professional care for a medical condition that required a visit to our urgent care today.  Please excuse her absence related to this visit.    Sincerely,        Kimberly Bull    [facility]
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
## 2018-12-04 NOTE — TELEPHONE ENCOUNTER
Jessie is having pain in lower left side of back.  Pain started one week ago.  Jessie feels like her bladder is full and also has migraines.  Currently has a headache.
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
## 2018-12-13 DIAGNOSIS — G43.809 OTHER MIGRAINE WITHOUT STATUS MIGRAINOSUS, NOT INTRACTABLE: ICD-10-CM

## 2018-12-13 DIAGNOSIS — R51.9 HEADACHE: ICD-10-CM

## 2018-12-13 RX ORDER — KETOROLAC TROMETHAMINE 30 MG/ML
30 INJECTION, SOLUTION INTRAMUSCULAR; INTRAVENOUS ONCE
Qty: 1 ML | Refills: 0 | OUTPATIENT
Start: 2018-12-13 | End: 2018-12-13

## 2018-12-13 RX ORDER — NORTRIPTYLINE HCL 10 MG
CAPSULE ORAL
Qty: 90 CAPSULE | Refills: 0 | OUTPATIENT
Start: 2018-12-13

## 2018-12-13 NOTE — TELEPHONE ENCOUNTER
"Controlled Substance Refill Request for ketorolac (TORADOL) 30 MG/ML injection  Problem List Complete:  No     PROVIDER TO CONSIDER COMPLETION OF PROBLEM LIST AND OVERVIEW/CONTROLLED SUBSTANCE AGREEMENT    Last Written Prescription Date:  12/04/2018  Last Fill Quantity: 1 ML ,   # refills: 0    [place] [facility] [place]: 07/12/2017    Future Office visit:     Controlled substance agreement on file: No.     Processing:  Patient will  in clinic   checked in past 3 months?  No, route to RN     Requested Prescriptions   Pending Prescriptions Disp Refills     nortriptyline (PAMELOR) 10 MG capsule 90 capsule 0    Last Written Prescription Date:  09/23/2016  Last Fill Quantity: 90 capsule,  # refills: 0   Last office visit: 7/12/2017 with prescribing provider:  07/12/2017   Future Office Visit:     Sig: Using 10 mg once every 3 days    Tricyclic Agents ( Annual appt and no PHQ9) Failed - 12/13/2018  2:00 PM       Failed - Recent (12 mo) or future (30 days) visit within authorizing provider's specialty    Patient had office visit in the last 12 months or has a visit in the next 30 days with authorizing provider or within the authorizing provider's specialty.  See \"Patient Info\" tab in inbasket, or \"Choose Columns\" in Meds & Orders section of the refill encounter.             Passed - Blood Pressure under 140/90 in past 12 mos    BP Readings from Last 3 Encounters:   12/04/18 102/60   07/19/18 122/76   07/12/17 128/82                Passed - Patient is age 18 or older       Passed - Patient is not pregnant       Passed - No positive pregnancy test on record in past 12 mos                           Jorge Gonzales XRT    "

## 2018-12-13 NOTE — TELEPHONE ENCOUNTER
Toradol injection was given in UC so this refill is not appropriate  Tried calling pt and she has not seen PCP in over a year and nortriptyline has not been prescribed in 2 years.  Phone number on file is not working    Tori Del Valle RN, BSN

## 2018-12-21 DIAGNOSIS — R51.9 HEADACHE: ICD-10-CM

## 2018-12-21 RX ORDER — NORTRIPTYLINE HCL 10 MG
CAPSULE ORAL
Qty: 90 CAPSULE | Refills: 0 | OUTPATIENT
Start: 2018-12-21

## 2018-12-21 NOTE — TELEPHONE ENCOUNTER
"Requested Prescriptions   Pending Prescriptions Disp Refills     nortriptyline (PAMELOR) 10 MG capsule 90 capsule 0    Last Written Prescription Date:  09/23/2016  Last Fill Quantity: 90 capsule,  # refills: 0   Last office visit: 7/12/2017 with prescribing provider:  07/12/2017   Future Office Visit:     Sig: Using 10 mg once every 3 days    Tricyclic Agents ( Annual appt and no PHQ9) Failed - 12/21/2018 10:11 AM       Failed - Recent (12 mo) or future (30 days) visit within authorizing provider's specialty    Patient had office visit in the last 12 months or has a visit in the next 30 days with authorizing provider or within the authorizing provider's specialty.  See \"Patient Info\" tab in inbasket, or \"Choose Columns\" in Meds & Orders section of the refill encounter.             Passed - Blood Pressure under 140/90 in past 12 mos    BP Readings from Last 3 Encounters:   12/04/18 102/60   07/19/18 122/76   07/12/17 128/82                Passed - Patient is age 18 or older       Passed - Patient is not pregnant       Passed - No positive pregnancy test on record in past 12 mos        Joreg ORELLANAT  "

## 2019-01-20 ENCOUNTER — APPOINTMENT (OUTPATIENT)
Dept: CT IMAGING | Facility: CLINIC | Age: 49
End: 2019-01-20
Payer: COMMERCIAL

## 2019-01-20 ENCOUNTER — APPOINTMENT (OUTPATIENT)
Dept: GENERAL RADIOLOGY | Facility: CLINIC | Age: 49
End: 2019-01-20
Payer: COMMERCIAL

## 2019-01-20 ENCOUNTER — HOSPITAL ENCOUNTER (EMERGENCY)
Facility: CLINIC | Age: 49
Discharge: HOME | End: 2019-01-20
Attending: INTERNAL MEDICINE | Admitting: INTERNAL MEDICINE
Payer: COMMERCIAL

## 2019-01-20 VITALS
HEART RATE: 84 BPM | BODY MASS INDEX: 39.65 KG/M2 | HEIGHT: 61 IN | RESPIRATION RATE: 12 BRPM | WEIGHT: 210 LBS | OXYGEN SATURATION: 99 % | SYSTOLIC BLOOD PRESSURE: 94 MMHG | DIASTOLIC BLOOD PRESSURE: 65 MMHG | TEMPERATURE: 98 F

## 2019-01-20 DIAGNOSIS — J45.901 EXACERBATION OF ASTHMA, UNSPECIFIED ASTHMA SEVERITY, UNSPECIFIED WHETHER PERSISTENT: ICD-10-CM

## 2019-01-20 DIAGNOSIS — R07.9 CHEST PAIN, UNSPECIFIED TYPE: ICD-10-CM

## 2019-01-20 LAB
ANION GAP SERPL CALCULATED.3IONS-SCNC: 4 MMOL/L (ref 3–14)
BASOPHILS # BLD AUTO: 0 10E9/L (ref 0–0.2)
BASOPHILS NFR BLD AUTO: 0.3 %
BUN SERPL-MCNC: 9 MG/DL (ref 7–30)
CALCIUM SERPL-MCNC: 9.1 MG/DL (ref 8.5–10.1)
CHLORIDE SERPL-SCNC: 106 MMOL/L (ref 94–109)
CO2 SERPL-SCNC: 30 MMOL/L (ref 20–32)
CREAT SERPL-MCNC: 0.79 MG/DL (ref 0.52–1.04)
DIFFERENTIAL METHOD BLD: ABNORMAL
EOSINOPHIL # BLD AUTO: 0.1 10E9/L (ref 0–0.7)
EOSINOPHIL NFR BLD AUTO: 0.5 %
ERYTHROCYTE [DISTWIDTH] IN BLOOD BY AUTOMATED COUNT: 16.6 % (ref 10–15)
FLUAV+FLUBV AG SPEC QL: NEGATIVE
FLUAV+FLUBV AG SPEC QL: NEGATIVE
GFR SERPL CREATININE-BSD FRML MDRD: 89 ML/MIN/{1.73_M2}
GLUCOSE SERPL-MCNC: 93 MG/DL (ref 70–99)
HCT VFR BLD AUTO: 41.4 % (ref 35–47)
HGB BLD-MCNC: 13 G/DL (ref 11.7–15.7)
IMM GRANULOCYTES # BLD: 0.1 10E9/L (ref 0–0.4)
IMM GRANULOCYTES NFR BLD: 0.4 %
LYMPHOCYTES # BLD AUTO: 4.1 10E9/L (ref 0.8–5.3)
LYMPHOCYTES NFR BLD AUTO: 33.3 %
MCH RBC QN AUTO: 22.4 PG (ref 26.5–33)
MCHC RBC AUTO-ENTMCNC: 31.4 G/DL (ref 31.5–36.5)
MCV RBC AUTO: 71 FL (ref 78–100)
MONOCYTES # BLD AUTO: 0.6 10E9/L (ref 0–1.3)
MONOCYTES NFR BLD AUTO: 4.6 %
NEUTROPHILS # BLD AUTO: 7.5 10E9/L (ref 1.6–8.3)
NEUTROPHILS NFR BLD AUTO: 60.9 %
NRBC # BLD AUTO: 0 10*3/UL
NRBC BLD AUTO-RTO: 0 /100
PLATELET # BLD AUTO: 358 10E9/L (ref 150–450)
POTASSIUM SERPL-SCNC: 3.9 MMOL/L (ref 3.4–5.3)
RBC # BLD AUTO: 5.8 10E12/L (ref 3.8–5.2)
SODIUM SERPL-SCNC: 140 MMOL/L (ref 133–144)
SPECIMEN SOURCE: NORMAL
TROPONIN I SERPL-MCNC: <0.015 UG/L (ref 0–0.04)
WBC # BLD AUTO: 12.3 10E9/L (ref 4–11)

## 2019-01-20 PROCEDURE — 80048 BASIC METABOLIC PNL TOTAL CA: CPT | Performed by: INTERNAL MEDICINE

## 2019-01-20 PROCEDURE — 94640 AIRWAY INHALATION TREATMENT: CPT

## 2019-01-20 PROCEDURE — 25000125 ZZHC RX 250: Performed by: INTERNAL MEDICINE

## 2019-01-20 PROCEDURE — 87804 INFLUENZA ASSAY W/OPTIC: CPT | Performed by: INTERNAL MEDICINE

## 2019-01-20 PROCEDURE — 25000128 H RX IP 250 OP 636: Performed by: INTERNAL MEDICINE

## 2019-01-20 PROCEDURE — 71046 X-RAY EXAM CHEST 2 VIEWS: CPT

## 2019-01-20 PROCEDURE — 71260 CT THORAX DX C+: CPT

## 2019-01-20 PROCEDURE — 93005 ELECTROCARDIOGRAM TRACING: CPT

## 2019-01-20 PROCEDURE — 84484 ASSAY OF TROPONIN QUANT: CPT | Performed by: INTERNAL MEDICINE

## 2019-01-20 PROCEDURE — 99285 EMERGENCY DEPT VISIT HI MDM: CPT | Mod: 25

## 2019-01-20 PROCEDURE — 96374 THER/PROPH/DIAG INJ IV PUSH: CPT | Mod: 59

## 2019-01-20 PROCEDURE — 85025 COMPLETE CBC W/AUTO DIFF WBC: CPT | Performed by: INTERNAL MEDICINE

## 2019-01-20 RX ORDER — ALBUTEROL SULFATE 90 UG/1
2 AEROSOL, METERED RESPIRATORY (INHALATION) EVERY 4 HOURS PRN
Qty: 1 INHALER | Refills: 0 | Status: SHIPPED | OUTPATIENT
Start: 2019-01-20 | End: 2019-02-19

## 2019-01-20 RX ORDER — IPRATROPIUM BROMIDE AND ALBUTEROL SULFATE 2.5; .5 MG/3ML; MG/3ML
3 SOLUTION RESPIRATORY (INHALATION)
Status: DISPENSED | OUTPATIENT
Start: 2019-01-20 | End: 2019-01-20

## 2019-01-20 RX ORDER — KETOROLAC TROMETHAMINE 15 MG/ML
15 INJECTION, SOLUTION INTRAMUSCULAR; INTRAVENOUS ONCE
Status: COMPLETED | OUTPATIENT
Start: 2019-01-20 | End: 2019-01-20

## 2019-01-20 RX ORDER — PREDNISONE 20 MG/1
TABLET ORAL
Qty: 10 TABLET | Refills: 0 | Status: SHIPPED | OUTPATIENT
Start: 2019-01-20 | End: 2019-01-27

## 2019-01-20 RX ORDER — IOPAMIDOL 755 MG/ML
500 INJECTION, SOLUTION INTRAVASCULAR ONCE
Status: COMPLETED | OUTPATIENT
Start: 2019-01-20 | End: 2019-01-20

## 2019-01-20 RX ORDER — HYDROMORPHONE HYDROCHLORIDE 1 MG/ML
0.5 INJECTION, SOLUTION INTRAMUSCULAR; INTRAVENOUS; SUBCUTANEOUS
Status: DISCONTINUED | OUTPATIENT
Start: 2019-01-20 | End: 2019-01-20 | Stop reason: HOSPADM

## 2019-01-20 RX ADMIN — KETOROLAC TROMETHAMINE 15 MG: 15 INJECTION, SOLUTION INTRAMUSCULAR; INTRAVENOUS at 14:29

## 2019-01-20 RX ADMIN — SODIUM CHLORIDE 88 ML: 9 INJECTION, SOLUTION INTRAVENOUS at 15:03

## 2019-01-20 RX ADMIN — IOPAMIDOL 75 ML: 755 INJECTION, SOLUTION INTRAVENOUS at 15:03

## 2019-01-20 RX ADMIN — IPRATROPIUM BROMIDE AND ALBUTEROL SULFATE 3 ML: .5; 3 SOLUTION RESPIRATORY (INHALATION) at 13:13

## 2019-01-20 ASSESSMENT — ENCOUNTER SYMPTOMS
FEVER: 0
NUMBNESS: 0
NAUSEA: 0
CHILLS: 1
ABDOMINAL PAIN: 0
VOMITING: 0
COUGH: 1
SHORTNESS OF BREATH: 1
CHEST TIGHTNESS: 1

## 2019-01-20 ASSESSMENT — MIFFLIN-ST. JEOR: SCORE: 1524.93

## 2019-01-20 NOTE — ED TRIAGE NOTES
Chest pain accompanied with tightness and cough starting about 2 hours PTA. Hx of aortic valve regurgitation. Has not taken medications at home this AM.

## 2019-01-20 NOTE — ED PROVIDER NOTES
"  History     Chief Complaint:  Chest Tightness     HPI   Jessie Seymour is a 48 year old female, with a history of CAD, aortic valve regurgitation, and asthma, who presents with her son to the ED for evaluation of chest tightness. The patient reports she developed chest tightness with shortness of breath and cough 2 hours prior to evaluation. She also developed chills while on her way to the ED. The patient notes her symptoms feel similar to an asthma exacerbation or her aortic valve regurgitation. She has not taken medication for her symptoms. The patient denies any fever, nausea, vomiting, abdominal pain, numbness, or tingling.      Allergies:  Loratadine: \"dries up & gets rare sinus infection\"  Morphine: itching & pruritis   Oxycodone: diaphoresis, nausea, vomiting, & itching   Zofran: \"cardio recommended never to take\"   Codeine: tearful & anxious      Medications:    Albuterol neb  Albuterol inhaler  Aspirin 81mg  Benadryl    Duoneb  Pamelor  Omeprazole    Past Medical History:   Allergic rhinitis    Asthma  Alpha thalassemia  Aortic valve regurgitation  Arthritis   CAD  Dumping syndrome  Hiatal hernia  Migraines  Chronic pain  Sleep apnea    Past Surgical History:    Biopsy   Left breast biopsy w/ seed localization  D&C  Eye surgery  GYN surgery  Herniorrhaphy  LEWIS  Cholecystectomy  Lysis adhesions   Left ovarian herniation surgery    Family History:    Long QT syndrome  Heart disease  Breast cancer  Thyroid cancer  Osteoporosis  Dementia  Stroke  HTN  Genetic disorder  Diabetes  Musculoskeletal disorder  Lupus  Asthma  Cerebrovascular disease  Sickle cell anemia  PCOS  Depression     Social History:  Smoking status: Current every day smoker, 0.50ppd  Alcohol use: No  Presents to ED with son    Marital Status:  Single [1]     Review of Systems   Constitutional: Positive for chills. Negative for fever.   Respiratory: Positive for cough, chest tightness and shortness of breath.    Gastrointestinal: Negative " "for abdominal pain, nausea and vomiting.   Neurological: Negative for numbness.   All other systems reviewed and are negative.    Physical Exam     Patient Vitals for the past 24 hrs:   BP Temp Temp src Pulse Heart Rate Resp SpO2 Height Weight   01/20/19 1545 94/65 -- -- 84 87 12 99 % -- --   01/20/19 1530 97/69 -- -- 86 87 12 98 % -- --   01/20/19 1515 124/72 -- -- -- -- -- -- -- --   01/20/19 1445 104/62 -- -- 89 89 14 98 % -- --   01/20/19 1430 106/63 -- -- 88 90 15 97 % -- --   01/20/19 1425 118/73 -- -- 84 86 14 -- -- --   01/20/19 1423 118/73 -- -- 84 84 17 99 % -- --   01/20/19 1231 134/54 98  F (36.7  C) Oral 88 88 20 98 % 1.549 m (5' 1\") 95.3 kg (210 lb)     Physical Exam   Constitutional:   Pleasant and cooperative   HENT:   Right Ear: Tympanic membrane normal.   Left Ear: Tympanic membrane normal.   Mouth/Throat: Oropharynx is clear and moist and mucous membranes are normal. No posterior oropharyngeal erythema.   Eyes: Conjunctivae are normal.   Neck: Neck supple.   Cardiovascular: Normal rate, regular rhythm and normal heart sounds.   Pulmonary/Chest: Effort normal and breath sounds normal.   Abdominal: Soft. Bowel sounds are normal. She exhibits no distension. There is no tenderness. There is no rebound and no guarding.   Musculoskeletal: Normal range of motion.   Neurological: She is alert.   Skin: Skin is warm and dry.   Psychiatric: She has a normal mood and affect.       Emergency Department Course     ECG (12:33:59):  Rate 85 bpm. TN interval 182. QRS duration 78. QT/QTc 362/430. P-R-T axes 41 -6 22. Normal sinus rhythm. Inferior infarct, age undetermined. Abnormal ECG. No significant change compared to EKG dated 5/24/16. Interpreted at 1239 by Jaymie Morton MD.    Imaging:  Radiographic findings were communicated with the patient who voiced understanding of the findings.    XR Chest 2 Views  IMPRESSION: Since March 14, 2016, heart size remains normal. Scattered  bibasilar opacities may be " atelectasis. Somewhat shallow inspiration.  Early pneumonia isn't excluded, though not radiographically  definitive. No pleural effusion or pneumothorax. As read by Radiology.     Chest CT, IV Contrast Only-PE Protocol  IMPRESSION:  1. No CT evidence of pulmonary embolism.  2. Bilateral basilar linear pulmonary atelectasis or fibrosis.  3. Hepatic fatty infiltration--possible etiologies include consumption  of alcohol or excessive carbohydrate intake, especially  sugar/fructose. Metabolic syndrome commonly occurs in combination with  nonalcoholic fatty liver disease. Although often reversible,  nonalcoholic fatty liver disease can progress to steatohepatitis and  cirrhosis.  As read by Radiology.     Laboratory:  Influenza A/B antigen: Negative     Troponin I (1323): <0.015    CBC: WBC 12.3(H), o/w WNL (HGB 13.0, )  BMP: o/w WNL (Creatinine 0.79)     Interventions:  1313: Duoneb 3mLs Neb   1429: Toradol 15mg IV    Emergency Department Course:  Past medical records, nursing notes, and vitals reviewed.  1240: I performed an exam of the patient and obtained history, as documented above.    IV inserted and blood drawn.    The patient was sent for a chest x-ray while in the emergency department, findings above.    1440: I rechecked the patient. Explained findings to patient.    The patient was sent for a chest CT while in the emergency department, findings above.    1537: I rechecked the patient. Explained findings to patient.    Findings and plan explained to the Patient. Patient discharged home with instructions regarding supportive care, medications, and reasons to return. The importance of close follow-up was reviewed.     Impression & Plan      Medical Decision Making:    Jessie Seymour is a 48 year old female who presents to the emergency department with sudden onset of pleuritic chest pain, shortness of breath, and a tight feeling in the chest.  She initially suspected this could be due to an asthma  exacerbation or to her aortic region regurgitation.  However I did not actually appreciate any wheezing on exam and did not feel I could necessarily attributed to this.  Influenza test was negative and chest x-ray showed some basilar abnormalities that were of unclear etiology.  We proceeded to CT pulmonary angiogram.  This shows no evidence of pulmonary embolus.  Radiology was able to note that the abnormal areas on x-ray appeared to be atelectasis versus linear fibrosis but did not represent an alveolar infiltrate.  I also considered a cardiac etiology though symptoms were very atypical.  EKG and troponin are negative.  Given the patient's symptoms and clinical course I do not feel further cardiac evaluation is required. Patient did note some symptomatic improvement with nebs.  I will discharge the patient home with aggressive treatment for bronchospasm, close follow-up in clinic.      Diagnosis:    ICD-10-CM   1. Chest pain, unspecified type R07.9   2. Exacerbation of asthma, unspecified asthma severity, unspecified whether persistent J45.901     Disposition: Patient discharged to home with son      Discharge Medications:  albuterol (2.5 MG/3ML) 0.083% neb solution  Commonly known as:  PROVENTIL  1 vial, Nebulization, EVERY 6 HOURS PRN  What changed:  Another medication with the same name was changed. Make sure you understand how and when to take each.     albuterol 108 (90 Base) MCG/ACT inhaler  Commonly known as:  PROAIR HFA  2 puffs, Inhalation, EVERY 4 HOURS PRN  What changed:  Another medication with the same name was changed. Make sure you understand how and when to take each.     predniSONE 20 MG tablet  Commonly known as:  DELTASONE  Take two tablets (= 40mg) each day for 5 (five) days  What changed:      medication strength    how much to take    how to take this    when to take this    reasons to take this    additional instructions       Adilia Baltazar  1/20/2019   [facility] EMERGENCY  DEPARTMENT    Scribe Disclosure:  I, Adilia Vasquezuyen, am serving as a scribe at 12:40 PM on 1/20/2019 to document services personally performed by Jaymie Morton MD based on my observations and the provider's statements to me.        Jaymie Morton MD  01/20/19 1342

## 2019-01-20 NOTE — DISCHARGE INSTRUCTIONS
"Discharge Instructions  Bronchitis, Pneumonia, Bronchospasm    You were seen today for a chest infection or inflammation. If your doctor decided this was due to a bacterial infection, you may need an antibiotic. Sometimes these are caused by a virus, and then an antibiotic will not help.     Return to the Emergency Department if:  Your breathing gets much worse.  You are very weak, or feel much more ill.  You develop new symptoms, such as chest pain.  You cough up blood.  You are vomiting enough that you can?t keep fluids or your medicine down.    What can I do to help myself?  Fill any prescriptions the doctor gave you and take them right away--especially antibiotics. Be sure to finish the whole antibiotic prescription.  You may be given a prescription for an inhaler, which can help loosen tight air passages.  Use this as needed, but not more often than directed. Inhalers work much better when used with a spacer.   You may be given a prescription for a steroid to reduce inflammation. Used long-term, these can have many serious side effects, but for short courses these do not happen. You may notice restlessness or increased appetite.      You may use non-prescription cough or cold medicines. Cough medicines may help, but don?t make the cough go away completely.   Avoid smoke, because this can make your symptoms worse. If you smoke, this may be a good time to quit! Consider using nicotine lozenges, gum, or patches to reduce cravings.   If you have a fever, Tylenol  (acetaminophen), Motrin  (ibuprofen), or Advil  (ibuprofen) may help bring fever down and may help you feel more comfortable. Be sure to read and follow the package directions, and ask your doctor if you have questions.  Be sure to get your flu shot each year.  The pneumonia shot can help prevent pneumonia.  Probiotics: If you have been given an antibiotic, you may want to also take a probiotic pill or eat yogurt with live cultures. Probiotics have \"good " "bacteria\" to help your intestines stay healthy. Studies have shown that probiotics help prevent diarrhea and other intestine problems (including C. diff infection) when you take antibiotics. You can buy these without a prescription in the pharmacy section of the store.     If your doctor has told you to follow-up at your clinic, be sure to call right away and go to your appointment.  If there is any problem with keeping your appointment, call your doctor or return to the Emergency Department.    If you were given a prescription for medicine here today, be sure to read all of the information (including the package insert) that comes with your prescription.  This will include important information about the medicine, its side effects, and any warnings that you need to know about.  The pharmacist who fills the prescription can provide more information and answer questions you may have about the medicine.  If you have questions or concerns that the pharmacist cannot address, please call or return to the Emergency Department.     Opioid Medication Information    Pain medications are among the most commonly prescribed medicines, so we are including this information for all our patients. If you did not receive pain medication or get a prescription for pain medicine, you can ignore it.     You may have been given a prescription for an opioid (narcotic) pain medicine and/or have received a pain medicine while here in the Emergency Department. These medicines can make you drowsy or impaired. You must not drive, operate dangerous equipment, or engage in any other dangerous activities while taking these medications. If you drive while taking these medications, you could be arrested for DUI, or driving under the influence. Do not drink any alcohol while you are taking these medications.     Opioid pain medications can cause addiction. If you have a history of chemical dependency of any type, you are at a higher risk of becoming " addicted to pain medications.  Only take these prescribed medications to treat your pain when all other options have been tried. Take it for as short a time and as few doses as possible. Store your pain pills in a secure place, as they are frequently stolen and provide a dangerous opportunity for children or visitors in your house to start abusing these powerful medications. We will not replace any lost or stolen medicine.  As soon as your pain is better, you should flush all your remaining medication.     Many prescription pain medications contain Tylenol  (acetaminophen), including Vicodin , Tylenol #3 , Norco , Lortab , and Percocet .  You should not take any extra pills of Tylenol  if you are using these prescription medications or you can get very sick.  Do not ever take more than 3000 mg of acetaminophen in any 24 hour period.    All opioids tend to cause constipation. Drink plenty of water and eat foods that have a lot of fiber, such as fruits, vegetables, prune juice, apple juice and high fiber cereal.  Take a laxative if you don?t move your bowels at least every other day. Miralax , Milk of Magnesia, Colace , or Senna  can be used to keep you regular.      Remember that you can always come back to the Emergency Department if you are not able to see your regular doctor in the amount of time listed above, if you get any new symptoms, or if there is anything that worries you.

## 2019-01-20 NOTE — ED AVS SNAPSHOT
[facility] Emergency Department  [address]  [facility] [address]  Phone:  [phone]  Fax:  [phone]                                    Jessie Seymour   MRN: [id number]    Department:  [facility] Emergency Department   Date of Visit:  1/20/2019           After Visit Summary Signature Page    I have received my discharge instructions, and my questions have been answered. I have discussed any challenges I see with this plan with the nurse or doctor.    ..........................................................................................................................................  Patient/Patient Representative Signature      ..........................................................................................................................................  Patient Representative Print Name and Relationship to Patient    ..................................................               ................................................  Date                                   Time    ..........................................................................................................................................  Reviewed by Signature/Title    ...................................................              ..............................................  Date                                               Time          22EPIC Rev 08/18

## 2019-01-21 LAB — INTERPRETATION ECG - MUSE: NORMAL

## 2019-01-24 ENCOUNTER — OFFICE VISIT (OUTPATIENT)
Dept: URGENT CARE | Facility: URGENT CARE | Age: 49
End: 2019-01-24
Payer: COMMERCIAL

## 2019-01-24 VITALS
DIASTOLIC BLOOD PRESSURE: 60 MMHG | OXYGEN SATURATION: 99 % | SYSTOLIC BLOOD PRESSURE: 122 MMHG | HEART RATE: 83 BPM | RESPIRATION RATE: 16 BRPM | BODY MASS INDEX: 39.49 KG/M2 | TEMPERATURE: 98.5 F | WEIGHT: 209 LBS

## 2019-01-24 DIAGNOSIS — G43.009 MIGRAINE WITHOUT AURA AND WITHOUT STATUS MIGRAINOSUS, NOT INTRACTABLE: ICD-10-CM

## 2019-01-24 DIAGNOSIS — J20.9 BRONCHITIS WITH BRONCHOSPASM: Primary | ICD-10-CM

## 2019-01-24 PROCEDURE — 99213 OFFICE O/P EST LOW 20 MIN: CPT | Performed by: FAMILY MEDICINE

## 2019-01-24 RX ORDER — AZITHROMYCIN 250 MG/1
TABLET, FILM COATED ORAL
Qty: 6 TABLET | Refills: 0 | Status: SHIPPED | OUTPATIENT
Start: 2019-01-24 | End: 2019-01-29

## 2019-01-24 RX ORDER — PREDNISONE 20 MG/1
20 TABLET ORAL DAILY
Qty: 5 TABLET | Refills: 0 | Status: SHIPPED | OUTPATIENT
Start: 2019-01-24 | End: 2019-01-29

## 2019-01-24 RX ORDER — NORTRIPTYLINE HCL 10 MG
10 CAPSULE ORAL AT BEDTIME
Qty: 30 CAPSULE | Refills: 1 | Status: SHIPPED | OUTPATIENT
Start: 2019-01-24 | End: 2020-02-04

## 2019-01-24 RX ORDER — KETOROLAC TROMETHAMINE 10 MG/1
10 TABLET, FILM COATED ORAL EVERY 6 HOURS PRN
Qty: 4 TABLET | Refills: 0 | Status: CANCELLED | OUTPATIENT
Start: 2019-01-24

## 2019-01-24 NOTE — LETTER
[facility]  [address]  [address]  [place] [address]  Phone: [phone]  Fax: [phone]    January 24, 2019      RE:  Jessie HEBERT [facility]  [address] [place] [address]          To whom it may concern:    RE: Jessie Seymour    Patient was seen and treated today at our clinic.  Please excuse Jessie from work until Jan 26, 2019 due to medical illness.      Sincerely,        Ronald Forrester MD

## 2019-01-24 NOTE — PROGRESS NOTES
SUBJECTIVE:  Jessie Seymour, a 48 year old female scheduled an appointment to discuss the following issues:     Migraine without aura and without status migrainosus, not intractable  Bronchitis with bronchospasm    Medical, social, surgical, and family histories reviewed.    Urgent Care    URI (start 6 days sx (seen ED 1/20/18) xray showed something in lower right quadrant, shortness of breath, coughing, coughing causing incontinence, productive cough with yellow phglem, chest tightness tx Inhaler, 40 mg prednisone )     As above.  Tomorrow will be the 5th day of her Prednisone.  URI X 5days.  Seen in ED 4 days ago.  Pt has aortic regurgitation and alpha Thallassemia.  Also requesting Nortriptyline 10mg PO q3 days which she takes to prevent migraine.  Has intermittent asthma.  On Albuterol neb. URI and wheezing has continued. No diabetes.      ROS:  See HPI.  No nausea/vomiting.  No fever/chills.  No chest pain/SOB.  No BM/urine problems.  No dizziness or syncope.      OBJECTIVE:  /60 (BP Location: Right arm, Patient Position: Chair, Cuff Size: Adult Large)   Pulse 83   Temp 98.5  F (36.9  C) (Oral)   Resp 16   Wt 94.8 kg (209 lb)   LMP 06/22/2016 (Exact Date)   SpO2 99%   BMI 39.49 kg/m    EXAM:  GENERAL APPEARANCE: alert and mild distress; no cyanosis or accessory muscle use; moist mucus membrane; afebrile  EYES: Eyes grossly normal to inspection, PERRL and conjunctivae and sclerae normal  HENT: ear canals and TM's normal and nose and mouth without ulcers or lesions  NECK: no adenopathy, no asymmetry, masses, or scars and thyroid normal to palpation  RESP: mild scattered wheezes; no crackles; good air entry otherwise  CV: regular rates and rhythm, normal S1 S2, no S3 or S4 and no murmur, click or rub  LYMPHATICS: no cervical adenopathy  ABDOMEN: soft, nontender, without hepatosplenomegaly or masses and bowel sounds normal  MS: extremities normal- no gross deformities noted  SKIN: no suspicious  lesions or rashes  NEURO: Normal strength and tone, mentation intact and speech normal    ASSESSMENT/PLAN:  (J20.9) Bronchitis with bronchospasm  (primary encounter diagnosis)  Plan: azithromycin (ZITHROMAX) 250 MG tablet,         predniSONE (DELTASONE) 20 MG tablet      (G43.009) Migraine without aura and without status migrainosus, not intractable  Plan: Refilled nortriptyline (PAMELOR) 10 MG capsule    Care instructions given. Fluid, rest.  Pt to f/up PCP if no improvement or worsening.  Warning signs and symptoms explained---be seen ASAP if worsening.

## 2019-01-24 NOTE — PATIENT INSTRUCTIONS
Patient Education     Viral or Bacterial Bronchitis with Wheezing (Adult)    Bronchitis is an infection of the air passages. It often occurs during a cold and is usually caused by a virus. Symptoms include cough with mucus (phlegm) and low-grade fever. This illness is contagious during the first few days and is spread through the air by coughing and sneezing, or by direct contact (touching the sick person and then touching your own eyes, nose, or mouth).  If there is a lot of inflammation, air flow is restricted. The air passages may also go into spasm, especially if you have asthma. This causes wheezing and difficulty breathing even in people who do not have asthma.  Bronchitis usually lasts 7 to 14 days. The wheezing should improve with treatment during the first week. An inhaler is often prescribed to relax the air passages and stop wheezing. Antibiotics will be prescribed if your doctor thinks there is also a secondary bacterial infection.  Home care    If symptoms are severe, rest at home for the first 2 to 3 days. When you go back to your usual activities, don't let yourself get too tired.    Dont s'moke. Also avoid being exposed to secondhand smoke.    You may use over-the-counter medicine to control fever or pain, unless another medicine was prescribed. Note: If you have chronic liver or kidney disease or have ever had a stomach ulcer or gastrointestinal bleeding, talk with your healthcare provider before using these medicines. Also talk to your provider if you are taking medicine to prevent blood clots.) Aspirin should never be given to anyone younger than 18 years of age who is ill with a viral infection or fever. It may cause severe liver or brain damage.    Your appetite may be poor, so a light diet is fine. Stay well hydrated by drinking 6 to 8 glasses of fluids per day (such as water, soft drinks, sports drinks, juices, tea, or soup). Extra fluids will help loosen secretions in the nose and lungs.     Over-the-counter cough, cold, and sore-throat medicines will not shorten the length of the illness, but they may be helpful to reduce symptoms. (Note: Don't use decongestants if you have high blood pressure.)    If you were given an inhaler, use it exactly as directed. If you need to use it more often than prescribed, your condition may be worsening. If this happens, contact your healthcare provider.    If prescribed, finish all antibiotic medicine, even if you are feeling better after only a few days.  Follow-up care  Follow up with your healthcare provider, or as advised. If you had an X-ray or ECG (electrocardiogram), a specialist will review it. You will be notified of any new findings that may affect your care.  If you are age 65 or older, or if you have a chronic lung disease or condition that affects your immune system, or you smoke, ask your healthcare provider about getting a pneumococcal vaccine and a yearly flu shot (influenza vaccine).  When to seek medical advice  Call your healthcare provider right away if any of these occur:    Fever of 100.4 F (38 C) or higher, or as directed by your healthcare provider    Coughing up increasing amounts of colored sputum    Weakness, drowsiness, headache, facial pain, ear pain, or a stiff neck  Call 911  Call 911 if any of these occur.    Coughing up blood    Worsening weakness, drowsiness, headache, or stiff neck    Increased wheezing not helped with medication, shortness of breath, or pain with breathing   Date Last Reviewed: 6/1/2018 2000-2018 [employer]. [address] [place] [address], [place] [address]. All rights reserved. This information is not intended as a substitute for professional medical care. Always follow your healthcare professional's instructions.

## 2019-07-25 ENCOUNTER — TELEPHONE (OUTPATIENT)
Dept: NURSING | Facility: CLINIC | Age: 49
End: 2019-07-25

## 2019-07-25 NOTE — LETTER
July 25, 2019      Jessie HEBERT [facility]  [address] [place] [address]        To Whom It May Concern,     Jacy is under my care.  She has had 2 booster vaccinations for MMR in 8/8/13 and again on 10/8/13.  She should not require another booster.       Sincerely,        Areli Mina PA-C

## 2019-07-25 NOTE — TELEPHONE ENCOUNTER
"47 y/o female calls requesting a letter be sent to her [place] employer regarding her current MMR inoculation status.   Apparently the \"measles\" portion of her MMR \"does not take\" even with the last 2 boosters it does not show up in her titer. She does not want to take the injection again. She would like this explained to her employer.      Best number to reach patient back at is [phone]     Jennifer Spencer RN - [place] Nurse Advisor  07/25/2019  9:45Am  "

## 2019-09-27 ENCOUNTER — NURSE TRIAGE (OUTPATIENT)
Dept: NURSING | Facility: CLINIC | Age: 49
End: 2019-09-27

## 2019-09-27 ENCOUNTER — HOSPITAL ENCOUNTER (EMERGENCY)
Facility: CLINIC | Age: 49
Discharge: HOME | End: 2019-09-28
Attending: EMERGENCY MEDICINE | Admitting: EMERGENCY MEDICINE
Payer: MEDICAID

## 2019-09-27 DIAGNOSIS — J18.9 PNEUMONIA OF RIGHT LOWER LOBE DUE TO INFECTIOUS ORGANISM: ICD-10-CM

## 2019-09-27 DIAGNOSIS — F17.200 SMOKER: ICD-10-CM

## 2019-09-27 LAB
ALBUMIN SERPL-MCNC: 3.7 G/DL (ref 3.4–5)
ALBUMIN UR-MCNC: NEGATIVE MG/DL
ALP SERPL-CCNC: 108 U/L (ref 40–150)
ALT SERPL W P-5'-P-CCNC: 26 U/L (ref 0–50)
ANION GAP SERPL CALCULATED.3IONS-SCNC: 8 MMOL/L (ref 3–14)
APPEARANCE UR: CLEAR
AST SERPL W P-5'-P-CCNC: 19 U/L (ref 0–45)
BACTERIA #/AREA URNS HPF: ABNORMAL /HPF
BILIRUB SERPL-MCNC: 0.2 MG/DL (ref 0.2–1.3)
BILIRUB UR QL STRIP: NEGATIVE
BUN SERPL-MCNC: 11 MG/DL (ref 7–30)
CALCIUM SERPL-MCNC: 9.2 MG/DL (ref 8.5–10.1)
CHLORIDE SERPL-SCNC: 107 MMOL/L (ref 94–109)
CO2 SERPL-SCNC: 26 MMOL/L (ref 20–32)
COLOR UR AUTO: YELLOW
CREAT SERPL-MCNC: 0.84 MG/DL (ref 0.52–1.04)
GFR SERPL CREATININE-BSD FRML MDRD: 82 ML/MIN/{1.73_M2}
GLUCOSE SERPL-MCNC: 103 MG/DL (ref 70–99)
GLUCOSE UR STRIP-MCNC: NEGATIVE MG/DL
HGB UR QL STRIP: NEGATIVE
HYALINE CASTS #/AREA URNS LPF: 1 /LPF (ref 0–2)
KETONES UR STRIP-MCNC: NEGATIVE MG/DL
LEUKOCYTE ESTERASE UR QL STRIP: ABNORMAL
LIPASE SERPL-CCNC: 317 U/L (ref 73–393)
MUCOUS THREADS #/AREA URNS LPF: PRESENT /LPF
NITRATE UR QL: NEGATIVE
PH UR STRIP: 6.5 PH (ref 5–7)
POTASSIUM SERPL-SCNC: 3.6 MMOL/L (ref 3.4–5.3)
PROT SERPL-MCNC: 7.5 G/DL (ref 6.8–8.8)
RBC #/AREA URNS AUTO: 1 /HPF (ref 0–2)
SODIUM SERPL-SCNC: 140 MMOL/L (ref 133–144)
SOURCE: ABNORMAL
SP GR UR STRIP: 1.02 (ref 1–1.03)
SQUAMOUS #/AREA URNS AUTO: 1 /HPF (ref 0–1)
UROBILINOGEN UR STRIP-MCNC: NORMAL MG/DL (ref 0–2)
WBC #/AREA URNS AUTO: <1 /HPF (ref 0–5)

## 2019-09-27 PROCEDURE — 25000132 ZZH RX MED GY IP 250 OP 250 PS 637: Performed by: EMERGENCY MEDICINE

## 2019-09-27 PROCEDURE — 99284 EMERGENCY DEPT VISIT MOD MDM: CPT | Mod: 25 | Performed by: EMERGENCY MEDICINE

## 2019-09-27 PROCEDURE — 81001 URINALYSIS AUTO W/SCOPE: CPT | Performed by: EMERGENCY MEDICINE

## 2019-09-27 PROCEDURE — 80053 COMPREHEN METABOLIC PANEL: CPT | Performed by: EMERGENCY MEDICINE

## 2019-09-27 PROCEDURE — 99284 EMERGENCY DEPT VISIT MOD MDM: CPT | Mod: Z6 | Performed by: EMERGENCY MEDICINE

## 2019-09-27 PROCEDURE — 83690 ASSAY OF LIPASE: CPT | Performed by: EMERGENCY MEDICINE

## 2019-09-27 PROCEDURE — 85025 COMPLETE CBC W/AUTO DIFF WBC: CPT | Performed by: EMERGENCY MEDICINE

## 2019-09-27 PROCEDURE — 96361 HYDRATE IV INFUSION ADD-ON: CPT | Performed by: EMERGENCY MEDICINE

## 2019-09-27 PROCEDURE — 25800030 ZZH RX IP 258 OP 636: Performed by: EMERGENCY MEDICINE

## 2019-09-27 PROCEDURE — 40000556 ZZH STATISTIC PERIPHERAL IV START W US GUIDANCE

## 2019-09-27 RX ORDER — SODIUM CHLORIDE 9 MG/ML
INJECTION, SOLUTION INTRAVENOUS CONTINUOUS
Status: DISCONTINUED | OUTPATIENT
Start: 2019-09-27 | End: 2019-09-28 | Stop reason: HOSPADM

## 2019-09-27 RX ORDER — ACETAMINOPHEN 325 MG/1
975 TABLET ORAL ONCE
Status: COMPLETED | OUTPATIENT
Start: 2019-09-27 | End: 2019-09-27

## 2019-09-27 RX ADMIN — SODIUM CHLORIDE: 9 INJECTION, SOLUTION INTRAVENOUS at 23:50

## 2019-09-27 RX ADMIN — ACETAMINOPHEN 975 MG: 325 TABLET, FILM COATED ORAL at 22:55

## 2019-09-27 ASSESSMENT — ENCOUNTER SYMPTOMS
FLANK PAIN: 1
FREQUENCY: 0
APPETITE CHANGE: 0
DYSURIA: 0
ABDOMINAL PAIN: 1
HEMATURIA: 0

## 2019-09-27 ASSESSMENT — MIFFLIN-ST. JEOR: SCORE: 1547.61

## 2019-09-27 NOTE — ED AVS SNAPSHOT
[facility], [place], Emergency Department  [address] [place] [id number]  Phone:  [phone]                                    Jessie Seymour   MRN: [id number]    Department:  [facility], Emergency Department   Date of Visit:  9/27/2019           After Visit Summary Signature Page    I have received my discharge instructions, and my questions have been answered. I have discussed any challenges I see with this plan with the nurse or doctor.    ..........................................................................................................................................  Patient/Patient Representative Signature      ..........................................................................................................................................  Patient Representative Print Name and Relationship to Patient    ..................................................               ................................................  Date                                   Time    ..........................................................................................................................................  Reviewed by Signature/Title    ...................................................              ..............................................  Date                                               Time          22EPIC Rev 08/18

## 2019-09-28 ENCOUNTER — APPOINTMENT (OUTPATIENT)
Dept: GENERAL RADIOLOGY | Facility: CLINIC | Age: 49
End: 2019-09-28
Attending: EMERGENCY MEDICINE
Payer: MEDICAID

## 2019-09-28 VITALS
WEIGHT: 215 LBS | BODY MASS INDEX: 40.59 KG/M2 | TEMPERATURE: 98.1 F | OXYGEN SATURATION: 100 % | DIASTOLIC BLOOD PRESSURE: 71 MMHG | HEIGHT: 61 IN | RESPIRATION RATE: 16 BRPM | HEART RATE: 87 BPM | SYSTOLIC BLOOD PRESSURE: 112 MMHG

## 2019-09-28 LAB
BASOPHILS # BLD AUTO: 0 10E9/L (ref 0–0.2)
BASOPHILS NFR BLD AUTO: 0.4 %
DIFFERENTIAL METHOD BLD: ABNORMAL
EOSINOPHIL # BLD AUTO: 0.3 10E9/L (ref 0–0.7)
EOSINOPHIL NFR BLD AUTO: 2.7 %
ERYTHROCYTE [DISTWIDTH] IN BLOOD BY AUTOMATED COUNT: 15.7 % (ref 10–15)
HCT VFR BLD AUTO: 40.3 % (ref 35–47)
HGB BLD-MCNC: 12.3 G/DL (ref 11.7–15.7)
IMM GRANULOCYTES # BLD: 0 10E9/L (ref 0–0.4)
IMM GRANULOCYTES NFR BLD: 0.2 %
LYMPHOCYTES # BLD AUTO: 5.1 10E9/L (ref 0.8–5.3)
LYMPHOCYTES NFR BLD AUTO: 54.4 %
MCH RBC QN AUTO: 22.5 PG (ref 26.5–33)
MCHC RBC AUTO-ENTMCNC: 30.5 G/DL (ref 31.5–36.5)
MCV RBC AUTO: 74 FL (ref 78–100)
MONOCYTES # BLD AUTO: 0.5 10E9/L (ref 0–1.3)
MONOCYTES NFR BLD AUTO: 5 %
NEUTROPHILS # BLD AUTO: 3.5 10E9/L (ref 1.6–8.3)
NEUTROPHILS NFR BLD AUTO: 37.3 %
NRBC # BLD AUTO: 0 10*3/UL
NRBC BLD AUTO-RTO: 0 /100
PLATELET # BLD AUTO: 283 10E9/L (ref 150–450)
RBC # BLD AUTO: 5.47 10E12/L (ref 3.8–5.2)
WBC # BLD AUTO: 9.3 10E9/L (ref 4–11)

## 2019-09-28 PROCEDURE — 96374 THER/PROPH/DIAG INJ IV PUSH: CPT | Performed by: EMERGENCY MEDICINE

## 2019-09-28 PROCEDURE — 71046 X-RAY EXAM CHEST 2 VIEWS: CPT

## 2019-09-28 PROCEDURE — 25000132 ZZH RX MED GY IP 250 OP 250 PS 637: Performed by: EMERGENCY MEDICINE

## 2019-09-28 PROCEDURE — 25000128 H RX IP 250 OP 636: Performed by: EMERGENCY MEDICINE

## 2019-09-28 RX ORDER — AZITHROMYCIN 250 MG/1
TABLET, FILM COATED ORAL
Qty: 6 TABLET | Refills: 0 | Status: SHIPPED | OUTPATIENT
Start: 2019-09-28 | End: 2020-02-18

## 2019-09-28 RX ORDER — KETOROLAC TROMETHAMINE 30 MG/ML
30 INJECTION, SOLUTION INTRAMUSCULAR; INTRAVENOUS ONCE
Status: COMPLETED | OUTPATIENT
Start: 2019-09-28 | End: 2019-09-28

## 2019-09-28 RX ORDER — AZITHROMYCIN 250 MG/1
500 TABLET, FILM COATED ORAL ONCE
Status: COMPLETED | OUTPATIENT
Start: 2019-09-28 | End: 2019-09-28

## 2019-09-28 RX ADMIN — KETOROLAC TROMETHAMINE 30 MG: 30 INJECTION, SOLUTION INTRAMUSCULAR at 01:06

## 2019-09-28 RX ADMIN — AZITHROMYCIN 500 MG: 250 TABLET, FILM COATED ORAL at 01:51

## 2019-09-28 NOTE — ED TRIAGE NOTES
Arrived via triage, complains of constant throbbing abdominal pain that spreads throughout entire abdomen. Pain increases with movement. Feels like it can be hard to breath due to pain, no chest tightness or shortness of breath. Took tums and simethicone. Pain has persisted for 2 days. Complains of nausea, states diarrhea/soft stools are normal for her.

## 2019-09-28 NOTE — DISCHARGE INSTRUCTIONS
You have a small pneumonia likely causing your symptoms  Take Tylenol and ibuprofen for pain  Start antibiotics as directed, you were given the 500 mg of azithromycin today take 250 mg or 1 tablet a day for the next 5 days
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
## 2019-09-28 NOTE — TELEPHONE ENCOUNTER
"\"I have been having abdominal pain for 3 days now. It's my sides and back too. I thought it was gas. I have tried OTC meds, no help. Now I feel like it's also going higher and it's worse when I cough, sneeze, or take a deep breath. \" Rates pain 4/10, Denies other sx. Triaged and advised to be seen within 24 hrs.  Hailey Camargo RN [facility]        Reason for Disposition    [1] MODERATE pain (e.g., interferes with normal activities) AND [2] pain comes and goes (cramps) AND [3] present > 24 hours  (Exception: pain with Vomiting or Diarrhea - see that Guideline)    Additional Information    Negative: Shock suspected (e.g., cold/pale/clammy skin, too weak to stand, low BP, rapid pulse)    Negative: Difficult to awaken or acting confused (e.g., disoriented, slurred speech)    Negative: Passed out (i.e., lost consciousness, collapsed and was not responding)    Negative: Sounds like a life-threatening emergency to the triager    Negative: Chest pain    Negative: Pain is mainly in upper abdomen  (if needed ask: \"is it mainly above the belly button?\")    Negative: Followed an abdomen (stomach) injury    Negative: [1] Abdominal pain AND [2] pregnant < 20 weeks    Negative: [1] Abdominal pain AND [2] pregnant > 20 weeks    Negative: [1] Abdominal pain AND [2] postpartum < 1 month since delivery    Negative: [1] SEVERE pain (e.g., excruciating) AND [2] present > 1 hour    Negative: [1] SEVERE pain AND [2] age > 60    Negative: [1] Vomiting AND [2] contains red blood or black (\"coffee ground\") material  (Exception: few red streaks in vomit that only happened once)    Negative: Blood in bowel movements   (Exception: blood on surface of BM with constipation)    Negative: Black or tarry bowel movements  (Exception: chronic-unchanged  black-grey bowel movements AND is taking iron pills or Pepto-bismol)    Negative: Patient sounds very sick or weak to the triager    Negative: [1] MILD-MODERATE pain AND [2] constant " AND [3] present > 2 hours    Negative: [1] Vomiting AND [2] abdomen looks much more swollen than usual    Negative: [1] Vomiting AND [2] contains bile (green color)    Negative: White of the eyes have turned yellow (i.e., jaundice)    Negative: Fever > 103 F (39.4 C)    Negative: [1] Fever > 101 F (38.3 C) AND [2] age > 60    Negative: [1] Fever > 100.0 F (37.8 C) AND [2] bedridden (e.g., nursing home patient, CVA, chronic illness, recovering from surgery)    Negative: [1] Fever > 100.0 F (37.8 C) AND [2] diabetes mellitus or weak immune system (e.g., HIV positive, cancer chemo, splenectomy, chronic steroids)    Negative: [1] SEVERE pain AND [2] present < 1 hour    Protocols used: ABDOMINAL PAIN - FEMALE-A-AH

## 2019-09-29 ENCOUNTER — HOSPITAL ENCOUNTER (EMERGENCY)
Facility: CLINIC | Age: 49
Discharge: HOME | End: 2019-09-29
Attending: EMERGENCY MEDICINE | Admitting: EMERGENCY MEDICINE
Payer: MEDICAID

## 2019-09-29 ENCOUNTER — APPOINTMENT (OUTPATIENT)
Dept: CT IMAGING | Facility: CLINIC | Age: 49
End: 2019-09-29
Attending: EMERGENCY MEDICINE
Payer: MEDICAID

## 2019-09-29 VITALS
WEIGHT: 215 LBS | TEMPERATURE: 97.8 F | OXYGEN SATURATION: 97 % | BODY MASS INDEX: 40.62 KG/M2 | RESPIRATION RATE: 18 BRPM | DIASTOLIC BLOOD PRESSURE: 60 MMHG | HEART RATE: 67 BPM | SYSTOLIC BLOOD PRESSURE: 129 MMHG

## 2019-09-29 DIAGNOSIS — R07.9 CHEST PAIN, UNSPECIFIED TYPE: ICD-10-CM

## 2019-09-29 LAB
ALBUMIN SERPL-MCNC: 3.6 G/DL (ref 3.4–5)
ALP SERPL-CCNC: 95 U/L (ref 40–150)
ALT SERPL W P-5'-P-CCNC: 19 U/L (ref 0–50)
ANION GAP SERPL CALCULATED.3IONS-SCNC: 5 MMOL/L (ref 3–14)
AST SERPL W P-5'-P-CCNC: 14 U/L (ref 0–45)
BASOPHILS # BLD AUTO: 0.1 10E9/L (ref 0–0.2)
BASOPHILS NFR BLD AUTO: 0.6 %
BILIRUB SERPL-MCNC: 0.3 MG/DL (ref 0.2–1.3)
BUN SERPL-MCNC: 14 MG/DL (ref 7–30)
CALCIUM SERPL-MCNC: 8.7 MG/DL (ref 8.5–10.1)
CHLORIDE SERPL-SCNC: 110 MMOL/L (ref 94–109)
CO2 SERPL-SCNC: 26 MMOL/L (ref 20–32)
CREAT SERPL-MCNC: 0.86 MG/DL (ref 0.52–1.04)
D DIMER PPP FEU-MCNC: 0.3 UG/ML FEU (ref 0–0.5)
DIFFERENTIAL METHOD BLD: ABNORMAL
EOSINOPHIL # BLD AUTO: 0.2 10E9/L (ref 0–0.7)
EOSINOPHIL NFR BLD AUTO: 2.4 %
ERYTHROCYTE [DISTWIDTH] IN BLOOD BY AUTOMATED COUNT: 15.4 % (ref 10–15)
GFR SERPL CREATININE-BSD FRML MDRD: 80 ML/MIN/{1.73_M2}
GLUCOSE SERPL-MCNC: 95 MG/DL (ref 70–99)
HCG SERPL QL: NEGATIVE
HCT VFR BLD AUTO: 37.3 % (ref 35–47)
HGB BLD-MCNC: 11.4 G/DL (ref 11.7–15.7)
IMM GRANULOCYTES # BLD: 0 10E9/L (ref 0–0.4)
IMM GRANULOCYTES NFR BLD: 0.3 %
INTERPRETATION ECG - MUSE: NORMAL
LIPASE SERPL-CCNC: 229 U/L (ref 73–393)
LYMPHOCYTES # BLD AUTO: 4.6 10E9/L (ref 0.8–5.3)
LYMPHOCYTES NFR BLD AUTO: 52.5 %
MCH RBC QN AUTO: 22.4 PG (ref 26.5–33)
MCHC RBC AUTO-ENTMCNC: 30.6 G/DL (ref 31.5–36.5)
MCV RBC AUTO: 73 FL (ref 78–100)
MONOCYTES # BLD AUTO: 0.5 10E9/L (ref 0–1.3)
MONOCYTES NFR BLD AUTO: 5.3 %
NEUTROPHILS # BLD AUTO: 3.4 10E9/L (ref 1.6–8.3)
NEUTROPHILS NFR BLD AUTO: 38.9 %
NRBC # BLD AUTO: 0 10*3/UL
NRBC BLD AUTO-RTO: 0 /100
PLATELET # BLD AUTO: 271 10E9/L (ref 150–450)
POTASSIUM SERPL-SCNC: 3.8 MMOL/L (ref 3.4–5.3)
PROT SERPL-MCNC: 6.9 G/DL (ref 6.8–8.8)
RBC # BLD AUTO: 5.1 10E12/L (ref 3.8–5.2)
SODIUM SERPL-SCNC: 141 MMOL/L (ref 133–144)
TROPONIN I SERPL-MCNC: <0.015 UG/L (ref 0–0.04)
WBC # BLD AUTO: 8.7 10E9/L (ref 4–11)

## 2019-09-29 PROCEDURE — 84484 ASSAY OF TROPONIN QUANT: CPT | Performed by: EMERGENCY MEDICINE

## 2019-09-29 PROCEDURE — 25000125 ZZHC RX 250: Performed by: EMERGENCY MEDICINE

## 2019-09-29 PROCEDURE — 25000128 H RX IP 250 OP 636: Performed by: EMERGENCY MEDICINE

## 2019-09-29 PROCEDURE — 25000132 ZZH RX MED GY IP 250 OP 250 PS 637: Performed by: EMERGENCY MEDICINE

## 2019-09-29 PROCEDURE — 74177 CT ABD & PELVIS W/CONTRAST: CPT

## 2019-09-29 PROCEDURE — 85025 COMPLETE CBC W/AUTO DIFF WBC: CPT | Performed by: EMERGENCY MEDICINE

## 2019-09-29 PROCEDURE — 99285 EMERGENCY DEPT VISIT HI MDM: CPT | Mod: 25 | Performed by: EMERGENCY MEDICINE

## 2019-09-29 PROCEDURE — 83690 ASSAY OF LIPASE: CPT | Performed by: EMERGENCY MEDICINE

## 2019-09-29 PROCEDURE — 93005 ELECTROCARDIOGRAM TRACING: CPT | Performed by: EMERGENCY MEDICINE

## 2019-09-29 PROCEDURE — 80053 COMPREHEN METABOLIC PANEL: CPT | Performed by: EMERGENCY MEDICINE

## 2019-09-29 PROCEDURE — 84703 CHORIONIC GONADOTROPIN ASSAY: CPT | Performed by: EMERGENCY MEDICINE

## 2019-09-29 PROCEDURE — 99284 EMERGENCY DEPT VISIT MOD MDM: CPT | Mod: 25 | Performed by: EMERGENCY MEDICINE

## 2019-09-29 PROCEDURE — 93010 ELECTROCARDIOGRAM REPORT: CPT | Mod: Z6 | Performed by: EMERGENCY MEDICINE

## 2019-09-29 PROCEDURE — 85379 FIBRIN DEGRADATION QUANT: CPT | Performed by: EMERGENCY MEDICINE

## 2019-09-29 PROCEDURE — 25000128 H RX IP 250 OP 636: Performed by: STUDENT IN AN ORGANIZED HEALTH CARE EDUCATION/TRAINING PROGRAM

## 2019-09-29 PROCEDURE — 96374 THER/PROPH/DIAG INJ IV PUSH: CPT | Mod: 59 | Performed by: EMERGENCY MEDICINE

## 2019-09-29 RX ORDER — KETOROLAC TROMETHAMINE 15 MG/ML
15 INJECTION, SOLUTION INTRAMUSCULAR; INTRAVENOUS ONCE
Status: DISCONTINUED | OUTPATIENT
Start: 2019-09-29 | End: 2019-09-29 | Stop reason: DRUGHIGH

## 2019-09-29 RX ORDER — IOPAMIDOL 755 MG/ML
135 INJECTION, SOLUTION INTRAVASCULAR ONCE
Status: COMPLETED | OUTPATIENT
Start: 2019-09-29 | End: 2019-09-29

## 2019-09-29 RX ORDER — KETOROLAC TROMETHAMINE 30 MG/ML
30 INJECTION, SOLUTION INTRAMUSCULAR; INTRAVENOUS ONCE
Status: COMPLETED | OUTPATIENT
Start: 2019-09-29 | End: 2019-09-29

## 2019-09-29 RX ADMIN — KETOROLAC TROMETHAMINE 30 MG: 30 INJECTION, SOLUTION INTRAMUSCULAR at 04:36

## 2019-09-29 RX ADMIN — LIDOCAINE HYDROCHLORIDE 30 ML: 20 SOLUTION ORAL; TOPICAL at 04:17

## 2019-09-29 RX ADMIN — IOPAMIDOL 135 ML: 755 INJECTION, SOLUTION INTRAVENOUS at 05:24

## 2019-09-29 ASSESSMENT — ENCOUNTER SYMPTOMS
ABDOMINAL PAIN: 1
SHORTNESS OF BREATH: 0
VOMITING: 0
FEVER: 0
NAUSEA: 1
COUGH: 1

## 2019-09-29 NOTE — ED PROVIDER NOTES
History   No chief complaint on file.    HPI  Jessie Seymour is a 48 year old female who has a past medical history significant for asthma, bronchitis, hiatal hernia who presents with chest and abdominal pain.  Patient's second ER visit in 24 hours for same complaint.  It is unclear exactly if patient's pain is more abdominal or chest.  Patient states that started 5 days ago across her entire abdomen, pain seems to be most intense in her epigastrium and made worse with movement, coughing, deep breaths however also endorses pain throughout her entire chest.  On her evaluation yesterday she had normal laboratory studies, a chest x-ray with concerning for right lower lobe pneumonia, it was felt that her pain was secondary to pleuritic pain from pneumonia she was treated with azithromycin and told to take anti-inflammatories.    Patient reports she feels diffusely unwell but denies any specific shortness of breath,productive cough but does have a slight nonproductive cough.  Denies nausea or vomiting but does endorse eating making her symptoms worse.  Patient denies history of DVT, PE, no lower extremity swelling or edema.  Denies black or tarry stools, denies bloody stools.    I have reviewed the Medications, Allergies, Past Medical and Surgical History, and Social History in the Epic system.    Review of Systems   Constitutional: Negative for fever.        Feels generally unwell   Respiratory: Positive for cough. Negative for shortness of breath.    Cardiovascular: Positive for chest pain. Negative for leg swelling.   Gastrointestinal: Positive for abdominal pain and nausea. Negative for vomiting.       Physical Exam   BP: 124/67  Pulse: 74  Heart Rate: 74  Temp: 97.8  F (36.6  C)  Resp: 16  Weight: 97.5 kg (215 lb)  SpO2: 99 %      Physical Exam   General: awake, alert, patient is tearful and appears uncomfortable with position changes  Head: normal cephalic  HEENT: pupils equal, conjugate gaze in tact  Neck:  Supple  CV: regular rate and rhythm without murmur.  Patient has equal peripheral pulses in all 4 extremities.  Lungs: clear to ascultation, no rales rhonchi or wheezes  Abd: soft, patient has right upper quadrant and epigastric tenderness to palpation.  No guarding or peritoneal signs.  Back: right sided CVA tenderness.   EXT: lower extremities without swelling or edema  Neuro: awake, answers questions appropriately. No focal deficits noted         ED Course        Procedures             EKG Interpretation:      Interpreted by Yohannes Palma MD  Time reviewed: 0311  Symptoms at time of EKG: chest pain/epigastric pain  Rhythm: normal sinus   Rate: normal  Axis: normal  Ectopy: none  Conduction: normal  ST Segments/ T Waves: Diffuse T wave flattening  Q Waves: none  Comparison to prior: Unchanged    Clinical Impression: No signs of acute ischemia, EKG unchanged from previous.         Labs Ordered and Resulted from Time of ED Arrival Up to the Time of Departure from the ED   CBC WITH PLATELETS DIFFERENTIAL - Abnormal; Notable for the following components:       Result Value    Hemoglobin 11.4 (*)     MCV 73 (*)     MCH 22.4 (*)     MCHC 30.6 (*)     RDW 15.4 (*)     All other components within normal limits   COMPREHENSIVE METABOLIC PANEL - Abnormal; Notable for the following components:    Chloride 110 (*)     All other components within normal limits   D DIMER QUANTITATIVE   LIPASE   TROPONIN I   HCG QUALITATIVE            Assessments & Plan (with Medical Decision Making)   Jacy Engle is a 48-year-old female who presents with persistent epigastric, abdominal and chest pain.  Patient has not had any improvement after being treated for 24 hours with azithromycin for presumed pneumonia with resultant pleuritic pain.  She has stable vital signs, but is tearful.  EKG shows no signs of acute ischemia.  Work-up from yesterday was reviewed.  Differential would include things like ACS, some type of acute lung pathology  such as PE, also consider some type of acute abdominal pathology such as a gastritis, pancreatic dysfunction, or appetite as though this is unlikely with her normal labs yesterday.  Would also consider pain from her known hiatal hernia.    Initial evaluation included repeat labs.  Repeat LFTs, lipase, white count all normal.  Patient has a negative d-dimer and a normal troponin.    EKG has no signs of acute ischemia, troponin is negative, history sounds very atypical for ACS so I do not think any further evaluation for ACS is warranted.  Negative d-dimer, no obvious risk factors for PE, and vital signs I do not support PE and PERC negative so will not pursue PE any further as a possible cause of her chest pain.     Patient got some relief with a GI cocktail, later got additional dose of Toradol for pain control.  Given unclear etiology of pain did obtain CT chest and abdomen which showed no acute pathology in the chest abdomen or pelvis.  Diverticulosis without evidence of diverticulitis.    Unclear what is causing the patient's chest and abdominal pain.  Would recommend following up with PCP for further evaluation, may be some sort of pleurisy would recommend NSAIDs and close PCP follow-up and return for any new or unusual symptoms.    I have reviewed the nursing notes.    I have reviewed the findings, diagnosis, plan and need for follow up with the patient.    New Prescriptions    No medications on file       Final diagnoses:   Chest pain, unspecified type       9/29/2019   [facility], [place], EMERGENCY DEPARTMENT     Yohannes Palma MD  09/29/19 [id number]

## 2019-09-29 NOTE — ED AVS SNAPSHOT
[facility], [place], Emergency Department  [address] [place] [id number]  Phone:  [phone]                                    Jessie Seymour   MRN: [id number]    Department:  [facility], Emergency Department   Date of Visit:  9/29/2019           After Visit Summary Signature Page    I have received my discharge instructions, and my questions have been answered. I have discussed any challenges I see with this plan with the nurse or doctor.    ..........................................................................................................................................  Patient/Patient Representative Signature      ..........................................................................................................................................  Patient Representative Print Name and Relationship to Patient    ..................................................               ................................................  Date                                   Time    ..........................................................................................................................................  Reviewed by Signature/Title    ...................................................              ..............................................  Date                                               Time          22EPIC Rev 08/18

## 2019-09-29 NOTE — ED TRIAGE NOTES
Patient reports chest pain and abdominal pain since Wednesday, was in ER and discharged yesterday. Reports increased chest pain tonight.

## 2019-09-29 NOTE — DISCHARGE INSTRUCTIONS
TODAY'S VISIT:  You were seen today for chest and abdominal pain.  Your labs are normal, your vital signs were normal, and your imaging is normal.  Unclear cause of your symptoms today please return if you have new or worsening symptoms  -   - If you had any labs or imaging/radiology tests performed today, you should also discuss these tests with your usual provider.     FOLLOW-UP:  Please make an appointment to follow up with:  - Your Primary Care Provider as soon as possible for further evaluation of your pain.    - Have your provider review the results from today's visit with you again to make sure no further follow-up or additional testing is needed based on those results.     PRESCRIPTIONS / MEDICATIONS:  -Can continue NSAIDs for possible pleurisy    RETURN TO THE EMERGENCY DEPARTMENT  Return to the Emergency Department at any time for any new or worsening symptoms or any concerns.

## 2019-10-01 ENCOUNTER — HEALTH MAINTENANCE LETTER (OUTPATIENT)
Age: 49
End: 2019-10-01

## 2020-01-16 ENCOUNTER — NURSE TRIAGE (OUTPATIENT)
Dept: NURSING | Facility: CLINIC | Age: 50
End: 2020-01-16

## 2020-01-16 NOTE — TELEPHONE ENCOUNTER
Jacy with history of asthma and aortic valve regurgitation.  Reports anxiety since X mas day, when her child's father passed away.  Anxiety causing SOB and chest pain, but reports the chest pain feels different than when she's had it before per her cardiac history.  Advised ED per triage protocol, patient may try UC.      Reason for Disposition    SEVERE chest pain    Additional Information    Chest pain    Negative: Difficulty breathing    Protocols used: CHEST PAIN-A-OH, ANXIETY AND PANIC ATTACK-A-OH

## 2020-01-20 ENCOUNTER — AMBULATORY - HEALTHEAST (OUTPATIENT)
Dept: CARDIOLOGY | Facility: CLINIC | Age: 50
End: 2020-01-20

## 2020-01-22 ENCOUNTER — OFFICE VISIT - HEALTHEAST (OUTPATIENT)
Dept: CARDIOLOGY | Facility: CLINIC | Age: 50
End: 2020-01-22

## 2020-01-22 DIAGNOSIS — R07.9 ACUTE CHEST PAIN: ICD-10-CM

## 2020-01-22 DIAGNOSIS — I35.1 NONRHEUMATIC AORTIC VALVE INSUFFICIENCY: ICD-10-CM

## 2020-01-22 ASSESSMENT — MIFFLIN-ST. JEOR: SCORE: 1474.11

## 2020-01-23 LAB
ATRIAL RATE - MUSE: 89 BPM
DIASTOLIC BLOOD PRESSURE - MUSE: NORMAL
INTERPRETATION ECG - MUSE: NORMAL
P AXIS - MUSE: 30 DEGREES
PR INTERVAL - MUSE: 186 MS
QRS DURATION - MUSE: 76 MS
QT - MUSE: 348 MS
QTC - MUSE: 423 MS
R AXIS - MUSE: -19 DEGREES
SYSTOLIC BLOOD PRESSURE - MUSE: NORMAL
T AXIS - MUSE: -5 DEGREES
VENTRICULAR RATE- MUSE: 89 BPM

## 2020-01-27 ENCOUNTER — OFFICE VISIT (OUTPATIENT)
Dept: URGENT CARE | Facility: URGENT CARE | Age: 50
End: 2020-01-27
Payer: MEDICAID

## 2020-01-27 VITALS
BODY MASS INDEX: 38.14 KG/M2 | TEMPERATURE: 98.6 F | SYSTOLIC BLOOD PRESSURE: 132 MMHG | WEIGHT: 202 LBS | HEART RATE: 100 BPM | HEIGHT: 61 IN | DIASTOLIC BLOOD PRESSURE: 72 MMHG | OXYGEN SATURATION: 100 %

## 2020-01-27 DIAGNOSIS — R06.02 SHORTNESS OF BREATH: Primary | ICD-10-CM

## 2020-01-27 DIAGNOSIS — F41.9 ANXIETY: ICD-10-CM

## 2020-01-27 DIAGNOSIS — J45.41 MODERATE PERSISTENT ASTHMA WITH EXACERBATION: ICD-10-CM

## 2020-01-27 PROCEDURE — 99214 OFFICE O/P EST MOD 30 MIN: CPT | Performed by: PHYSICIAN ASSISTANT

## 2020-01-27 RX ORDER — AZITHROMYCIN 250 MG/1
TABLET, FILM COATED ORAL
Qty: 6 TABLET | Refills: 0 | Status: SHIPPED | OUTPATIENT
Start: 2020-01-27 | End: 2020-02-04

## 2020-01-27 RX ORDER — IPRATROPIUM BROMIDE AND ALBUTEROL SULFATE 2.5; .5 MG/3ML; MG/3ML
1 SOLUTION RESPIRATORY (INHALATION) EVERY 6 HOURS PRN
Qty: 1 ML | Refills: 0 | Status: SHIPPED | OUTPATIENT
Start: 2020-01-27 | End: 2020-02-04

## 2020-01-27 RX ORDER — LORAZEPAM 0.5 MG/1
0.5 TABLET ORAL EVERY 6 HOURS PRN
Qty: 5 TABLET | Refills: 0 | Status: SHIPPED | OUTPATIENT
Start: 2020-01-27 | End: 2020-02-04

## 2020-01-27 RX ORDER — PREDNISONE 20 MG/1
20 TABLET ORAL DAILY
Qty: 5 TABLET | Refills: 0 | Status: SHIPPED | OUTPATIENT
Start: 2020-01-27 | End: 2020-02-04

## 2020-01-27 ASSESSMENT — MIFFLIN-ST. JEOR: SCORE: 1483.65
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
## 2020-02-04 ENCOUNTER — OFFICE VISIT (OUTPATIENT)
Dept: FAMILY MEDICINE | Facility: CLINIC | Age: 50
End: 2020-02-04
Payer: MEDICAID

## 2020-02-04 VITALS
OXYGEN SATURATION: 99 % | TEMPERATURE: 98.3 F | BODY MASS INDEX: 38.23 KG/M2 | DIASTOLIC BLOOD PRESSURE: 80 MMHG | RESPIRATION RATE: 16 BRPM | SYSTOLIC BLOOD PRESSURE: 128 MMHG | HEART RATE: 77 BPM | HEIGHT: 61 IN | WEIGHT: 202.5 LBS

## 2020-02-04 DIAGNOSIS — G43.009 MIGRAINE WITHOUT AURA AND WITHOUT STATUS MIGRAINOSUS, NOT INTRACTABLE: ICD-10-CM

## 2020-02-04 DIAGNOSIS — M54.2 NECK PAIN: ICD-10-CM

## 2020-02-04 DIAGNOSIS — F41.9 ANXIETY: ICD-10-CM

## 2020-02-04 DIAGNOSIS — J20.9 BRONCHITIS WITH BRONCHOSPASM: ICD-10-CM

## 2020-02-04 DIAGNOSIS — R32 URINARY INCONTINENCE, UNSPECIFIED TYPE: ICD-10-CM
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
## 2020-02-04 DIAGNOSIS — L29.9 ITCHING: ICD-10-CM

## 2020-02-04 DIAGNOSIS — J30.2 SEASONAL ALLERGIC RHINITIS, UNSPECIFIED TRIGGER: ICD-10-CM

## 2020-02-04 DIAGNOSIS — R21 RASH: ICD-10-CM

## 2020-02-04 DIAGNOSIS — F43.21 COMPLICATED BEREAVEMENT: ICD-10-CM

## 2020-02-04 DIAGNOSIS — F33.1 MAJOR DEPRESSIVE DISORDER, RECURRENT EPISODE, MODERATE (H): Primary | ICD-10-CM

## 2020-02-04 PROCEDURE — 96127 BRIEF EMOTIONAL/BEHAV ASSMT: CPT | Performed by: PHYSICIAN ASSISTANT

## 2020-02-04 PROCEDURE — 99214 OFFICE O/P EST MOD 30 MIN: CPT | Performed by: PHYSICIAN ASSISTANT

## 2020-02-04 RX ORDER — NORTRIPTYLINE HCL 10 MG
10 CAPSULE ORAL AT BEDTIME
Qty: 30 CAPSULE | Refills: 1 | Status: SHIPPED | OUTPATIENT
Start: 2020-02-04 | End: 2020-09-23

## 2020-02-04 RX ORDER — METHOCARBAMOL 500 MG/1
500 TABLET, FILM COATED ORAL 4 TIMES DAILY PRN
Qty: 30 TABLET | Refills: 0 | Status: SHIPPED | OUTPATIENT
Start: 2020-02-04 | End: 2020-09-23

## 2020-02-04 RX ORDER — KETOROLAC TROMETHAMINE 10 MG/1
10 TABLET, FILM COATED ORAL EVERY 6 HOURS PRN
Qty: 4 TABLET | Refills: 0 | Status: SHIPPED | OUTPATIENT
Start: 2020-02-04 | End: 2020-09-23

## 2020-02-04 RX ORDER — LORAZEPAM 0.5 MG/1
0.5 TABLET ORAL EVERY 6 HOURS PRN
Qty: 20 TABLET | Refills: 0 | Status: SHIPPED | OUTPATIENT
Start: 2020-02-04 | End: 2020-09-23

## 2020-02-04 RX ORDER — LANOLIN ALCOHOL/MO/W.PET/CERES
3 CREAM (GRAM) TOPICAL
COMMUNITY

## 2020-02-04 RX ORDER — HYDROCORTISONE VALERATE 2 MG/G
OINTMENT TOPICAL
Qty: 60 G | Refills: 0 | Status: SHIPPED | OUTPATIENT
Start: 2020-02-04

## 2020-02-04 RX ORDER — ESCITALOPRAM OXALATE 10 MG/1
10 TABLET ORAL DAILY
Qty: 30 TABLET | Refills: 1 | Status: SHIPPED | OUTPATIENT
Start: 2020-02-04 | End: 2020-09-23

## 2020-02-04 RX ORDER — ACETAMINOPHEN 325 MG/1
325-650 TABLET ORAL EVERY 6 HOURS PRN
COMMUNITY

## 2020-02-04 RX ORDER — FLUTICASONE PROPIONATE 50 MCG
1-2 SPRAY, SUSPENSION (ML) NASAL DAILY
Qty: 1 G | Refills: 11 | Status: SHIPPED | OUTPATIENT
Start: 2020-02-04 | End: 2021-07-07

## 2020-02-04 RX ORDER — ALBUTEROL SULFATE 90 UG/1
2 AEROSOL, METERED RESPIRATORY (INHALATION) EVERY 4 HOURS PRN
Qty: 1 INHALER | Refills: 1 | Status: SHIPPED | OUTPATIENT
Start: 2020-02-04 | End: 2021-07-07

## 2020-02-04 ASSESSMENT — ANXIETY QUESTIONNAIRES
2. NOT BEING ABLE TO STOP OR CONTROL WORRYING: NEARLY EVERY DAY
IF YOU CHECKED OFF ANY PROBLEMS ON THIS QUESTIONNAIRE, HOW DIFFICULT HAVE THESE PROBLEMS MADE IT FOR YOU TO DO YOUR WORK, TAKE CARE OF THINGS AT HOME, OR GET ALONG WITH OTHER PEOPLE: EXTREMELY DIFFICULT
5. BEING SO RESTLESS THAT IT IS HARD TO SIT STILL: NEARLY EVERY DAY
7. FEELING AFRAID AS IF SOMETHING AWFUL MIGHT HAPPEN: NEARLY EVERY DAY
1. FEELING NERVOUS, ANXIOUS, OR ON EDGE: NEARLY EVERY DAY
6. BECOMING EASILY ANNOYED OR IRRITABLE: MORE THAN HALF THE DAYS
GAD7 TOTAL SCORE: 20
3. WORRYING TOO MUCH ABOUT DIFFERENT THINGS: NEARLY EVERY DAY

## 2020-02-04 ASSESSMENT — PATIENT HEALTH QUESTIONNAIRE - PHQ9
5. POOR APPETITE OR OVEREATING: NEARLY EVERY DAY
SUM OF ALL RESPONSES TO PHQ QUESTIONS 1-9: 20

## 2020-02-04 ASSESSMENT — MIFFLIN-ST. JEOR: SCORE: 1485.91
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
## 2020-02-04 NOTE — LETTER
February 4, 2020      Jessie Seymour  [address]  [place] [address]        To Whom It May Concern:    Jessie Seymour was seen in our clinic. She may return to work without restrictions.      Sincerely,          Ramona Ann Aaseby-Aguilera, PA-C

## 2020-02-05 ASSESSMENT — ANXIETY QUESTIONNAIRES: GAD7 TOTAL SCORE: 20

## 2020-02-05 ASSESSMENT — ASTHMA QUESTIONNAIRES: ACT_TOTALSCORE: 10

## 2020-02-18 ENCOUNTER — OFFICE VISIT (OUTPATIENT)
Dept: FAMILY MEDICINE | Facility: CLINIC | Age: 50
End: 2020-02-18
Payer: MEDICAID

## 2020-02-18 VITALS
HEIGHT: 61 IN | TEMPERATURE: 98 F | HEART RATE: 84 BPM | OXYGEN SATURATION: 97 % | WEIGHT: 200.6 LBS | DIASTOLIC BLOOD PRESSURE: 70 MMHG | SYSTOLIC BLOOD PRESSURE: 120 MMHG | BODY MASS INDEX: 37.87 KG/M2 | RESPIRATION RATE: 18 BRPM

## 2020-02-18 DIAGNOSIS — J32.0 MAXILLARY SINUSITIS, UNSPECIFIED CHRONICITY: ICD-10-CM

## 2020-02-18 DIAGNOSIS — Z13.220 SCREENING, LIPID: ICD-10-CM

## 2020-02-18 DIAGNOSIS — B35.3 TINEA PEDIS OF BOTH FEET: ICD-10-CM

## 2020-02-18 DIAGNOSIS — F41.9 ANXIETY: ICD-10-CM

## 2020-02-18 DIAGNOSIS — N76.0 ACUTE VAGINITIS: ICD-10-CM

## 2020-02-18 DIAGNOSIS — Z13.0 SCREENING FOR BLOOD DISEASE: ICD-10-CM

## 2020-02-18 DIAGNOSIS — Z00.00 ROUTINE GENERAL MEDICAL EXAMINATION AT A HEALTH CARE FACILITY: Primary | ICD-10-CM

## 2020-02-18 DIAGNOSIS — Z12.31 VISIT FOR SCREENING MAMMOGRAM: ICD-10-CM

## 2020-02-18 DIAGNOSIS — Z13.29 SCREENING FOR THYROID DISORDER: ICD-10-CM

## 2020-02-18 DIAGNOSIS — F33.1 MAJOR DEPRESSIVE DISORDER, RECURRENT EPISODE, MODERATE (H): ICD-10-CM

## 2020-02-18 DIAGNOSIS — Z13.1 SCREENING FOR DIABETES MELLITUS: ICD-10-CM

## 2020-02-18 LAB
ALBUMIN SERPL-MCNC: 3.6 G/DL (ref 3.4–5)
ALP SERPL-CCNC: 120 U/L (ref 40–150)
ALT SERPL W P-5'-P-CCNC: 20 U/L (ref 0–50)
ANION GAP SERPL CALCULATED.3IONS-SCNC: 4 MMOL/L (ref 3–14)
AST SERPL W P-5'-P-CCNC: 12 U/L (ref 0–45)
BILIRUB SERPL-MCNC: 0.5 MG/DL (ref 0.2–1.3)
BUN SERPL-MCNC: 13 MG/DL (ref 7–30)
CALCIUM SERPL-MCNC: 9.1 MG/DL (ref 8.5–10.1)
CHLORIDE SERPL-SCNC: 109 MMOL/L (ref 94–109)
CHOLEST SERPL-MCNC: 191 MG/DL
CO2 SERPL-SCNC: 24 MMOL/L (ref 20–32)
CREAT SERPL-MCNC: 0.8 MG/DL (ref 0.52–1.04)
ERYTHROCYTE [DISTWIDTH] IN BLOOD BY AUTOMATED COUNT: 15.8 % (ref 10–15)
GFR SERPL CREATININE-BSD FRML MDRD: 87 ML/MIN/{1.73_M2}
GLUCOSE SERPL-MCNC: 88 MG/DL (ref 70–99)
HCT VFR BLD AUTO: 39.8 % (ref 35–47)
HDLC SERPL-MCNC: 30 MG/DL
HGB BLD-MCNC: 13 G/DL (ref 11.7–15.7)
LDLC SERPL CALC-MCNC: 129 MG/DL
MCH RBC QN AUTO: 22.7 PG (ref 26.5–33)
MCHC RBC AUTO-ENTMCNC: 32.7 G/DL (ref 31.5–36.5)
MCV RBC AUTO: 70 FL (ref 78–100)
NONHDLC SERPL-MCNC: 161 MG/DL
PLATELET # BLD AUTO: 347 10E9/L (ref 150–450)
POTASSIUM SERPL-SCNC: 4 MMOL/L (ref 3.4–5.3)
PROT SERPL-MCNC: 7.5 G/DL (ref 6.8–8.8)
RBC # BLD AUTO: 5.72 10E12/L (ref 3.8–5.2)
SODIUM SERPL-SCNC: 137 MMOL/L (ref 133–144)
SPECIMEN SOURCE: NORMAL
TRIGL SERPL-MCNC: 162 MG/DL
TSH SERPL DL<=0.005 MIU/L-ACNC: 1.1 MU/L (ref 0.4–4)
WBC # BLD AUTO: 8.5 10E9/L (ref 4–11)
WET PREP SPEC: NORMAL

## 2020-02-18 PROCEDURE — 99396 PREV VISIT EST AGE 40-64: CPT | Mod: 25 | Performed by: PHYSICIAN ASSISTANT

## 2020-02-18 PROCEDURE — 80053 COMPREHEN METABOLIC PANEL: CPT | Performed by: PHYSICIAN ASSISTANT

## 2020-02-18 PROCEDURE — 84443 ASSAY THYROID STIM HORMONE: CPT | Performed by: PHYSICIAN ASSISTANT

## 2020-02-18 PROCEDURE — 85027 COMPLETE CBC AUTOMATED: CPT | Performed by: PHYSICIAN ASSISTANT

## 2020-02-18 PROCEDURE — 36415 COLL VENOUS BLD VENIPUNCTURE: CPT | Performed by: PHYSICIAN ASSISTANT

## 2020-02-18 PROCEDURE — 99214 OFFICE O/P EST MOD 30 MIN: CPT | Mod: 25 | Performed by: PHYSICIAN ASSISTANT

## 2020-02-18 PROCEDURE — 87210 SMEAR WET MOUNT SALINE/INK: CPT | Performed by: PHYSICIAN ASSISTANT

## 2020-02-18 PROCEDURE — 80061 LIPID PANEL: CPT | Performed by: PHYSICIAN ASSISTANT

## 2020-02-18 RX ORDER — TERBINAFINE HYDROCHLORIDE 250 MG/1
250 TABLET ORAL 2 TIMES DAILY
Qty: 28 TABLET | Refills: 0 | Status: SHIPPED | OUTPATIENT
Start: 2020-02-18 | End: 2020-03-03

## 2020-02-18 RX ORDER — AMOXICILLIN 875 MG
875 TABLET ORAL 2 TIMES DAILY
Qty: 20 TABLET | Refills: 0 | Status: SHIPPED | OUTPATIENT
Start: 2020-02-18 | End: 2020-02-28

## 2020-02-18 RX ORDER — TERBINAFINE HYDROCHLORIDE 250 MG/1
250 TABLET ORAL DAILY
Qty: 28 TABLET | Refills: 0 | Status: SHIPPED | OUTPATIENT
Start: 2020-02-18 | End: 2020-03-03

## 2020-02-18 ASSESSMENT — ENCOUNTER SYMPTOMS
NERVOUS/ANXIOUS: 1
COUGH: 1
CONSTIPATION: 0
FREQUENCY: 0
CHILLS: 0
EYE PAIN: 1
FEVER: 0
DIARRHEA: 0
HEADACHES: 1
HEMATOCHEZIA: 0
JOINT SWELLING: 1
ABDOMINAL PAIN: 1
DIZZINESS: 1
HEMATURIA: 0
HEARTBURN: 1
ARTHRALGIAS: 1

## 2020-02-18 ASSESSMENT — MIFFLIN-ST. JEOR: SCORE: 1477.3

## 2020-02-18 NOTE — PROGRESS NOTES
SUBJECTIVE:   CC: Jessie Seymour is an 49 year old woman who presents for preventive health visit.     Healthy Habits:     Getting at least 3 servings of Calcium per day:  NO    Bi-annual eye exam:  Yes    Dental care twice a year:  NO    Sleep apnea or symptoms of sleep apnea:  Daytime drowsiness and Excessive snoring    Diet:  Breakfast skipped    Duration of exercise:  Less than 15 minutes    Taking medications regularly:  Yes    Medication side effects:  Lightheadedness    PHQ-2 Total Score: 2    Additional concerns today:  Yes      1)Foot fungus:  Bottoms of feet peeling.     2) Inside of nosed very painful and hurts to touch and is swollen    3) Anxiety/depession: took lexapro x 6 days and felt dizzy so stopped.      Will discuss concerns     Today's PHQ-2 Score:   PHQ-2 ( 1999 [employer]) 2/18/2020   Q1: Little interest or pleasure in doing things 1   Q2: Feeling down, depressed or hopeless 1   PHQ-2 Score 2   Q1: Little interest or pleasure in doing things Several days   Q2: Feeling down, depressed or hopeless Several days   PHQ-2 Score 2       Abuse: Current or Past(Physical, Sexual or Emotional)- No  Do you feel safe in your environment? Yes        Social History     Tobacco Use     Smoking status: Current Every Day Smoker     Packs/day: 0.50     Years: 15.00     Pack years: 7.50     Types: Cigarettes     Smokeless tobacco: Never Used     Tobacco comment: Pt. smokes appx. 6 cigarettes daily   Substance Use Topics     Alcohol use: Yes     Alcohol/week: 0.0 standard drinks         Alcohol Use 2/18/2020   Prescreen: >3 drinks/day or >7 drinks/week? No   Prescreen: >3 drinks/day or >7 drinks/week? -       Reviewed orders with patient.  Reviewed health maintenance and updated orders accordingly - Yes  BP Readings from Last 3 Encounters:   02/18/20 120/70   02/04/20 128/80   01/27/20 132/72    Wt Readings from Last 3 Encounters:   02/18/20 91 kg (200 lb 9.6 oz)   02/04/20 91.9 kg (202 lb 8 oz)   01/27/20 91.6  kg (202 lb)                  Recent Labs   Lab Test 09/29/19  0341 09/27/19  2315  03/07/17  0826  12/26/15  1007 01/05/15  0922   LDL  --   --   --  94  --  94 90   HDL  --   --   --  35*  --  27* 28*   TRIG  --   --   --  110  --  166* 154*   ALT 19 26  --  19  --  12 14   CR 0.86 0.84   < > 0.82   < > 0.85 0.77   GFRESTIMATED 80 82   < > 76   < > 72 82   GFRESTBLACK >90 >90   < > >90  African American GFR Calc     < > 87 >90   GFR Calc     POTASSIUM 3.8 3.6   < > 4.0   < > 4.2 4.1   TSH  --   --   --   --   --  1.11 1.58    < > = values in this interval not displayed.        Mammogram Screening: Patient under age 50, mutual decision reflected in health maintenance.      Pertinent mammograms are reviewed under the imaging tab.  History of abnormal Pap smear: Status post benign hysterectomy. Health Maintenance and Surgical History updated.  PAP / HPV Latest Ref Rng & Units 4/11/2016 8/8/2013 1/30/2012   PAP - NIL NIL NIL   HPV 16 DNA NEG Negative - -   HPV 18 DNA NEG Negative - -   OTHER HR HPV NEG Negative - -     Reviewed and updated as needed this visit by clinical staff  Tobacco  Allergies  Meds         Reviewed and updated as needed this visit by Provider            Review of Systems   Constitutional: Negative for chills and fever.   HENT: Positive for congestion, ear pain and hearing loss.    Eyes: Positive for pain.   Respiratory: Positive for cough.    Cardiovascular: Positive for chest pain and peripheral edema.   Gastrointestinal: Positive for abdominal pain and heartburn. Negative for constipation, diarrhea and hematochezia.   Genitourinary: Negative for frequency and hematuria.   Musculoskeletal: Positive for arthralgias and joint swelling.   Neurological: Positive for dizziness and headaches.   Psychiatric/Behavioral: Positive for mood changes. The patient is nervous/anxious.      CONSTITUTIONAL: NEGATIVE for fever, chills, change in weight  INTEGUMENTARY/SKIN: NEGATIVE for worrisome  "rashes, moles or lesions  EYES: NEGATIVE for vision changes or irritation  ENT: NEGATIVE for ear, mouth and throat problems  RESP: NEGATIVE for significant cough or SOB  BREAST: NEGATIVE for masses, tenderness or discharge  CV: NEGATIVE for chest pain, palpitations or peripheral edema  GI: NEGATIVE for nausea, abdominal pain, heartburn, or change in bowel habits  : NEGATIVE for unusual urinary or vaginal symptoms. No vaginal bleeding.  MUSCULOSKELETAL: NEGATIVE for significant arthralgias or myalgia  NEURO: NEGATIVE for weakness, dizziness or paresthesias  PSYCHIATRIC: NEGATIVE for changes in mood or affect      OBJECTIVE:   /70 (BP Location: Right arm, Patient Position: Chair, Cuff Size: Adult Large)   Pulse 84   Temp 98  F (36.7  C) (Oral)   Resp 18   Ht 1.549 m (5' 1\")   Wt 91 kg (200 lb 9.6 oz)   LMP 06/22/2016 (Exact Date)   SpO2 97%   Breastfeeding No   BMI 37.90 kg/m    Physical Exam  GENERAL APPEARANCE: healthy, alert and no distress  EYES: Eyes grossly normal to inspection, PERRL and conjunctivae and sclerae normal  HENT: ear canals and TM's normal, nose and mouth without ulcers or lesions, oropharynx clear and oral mucous membranes moist  NECK: no adenopathy, no asymmetry, masses, or scars and thyroid normal to palpation  RESP: lungs clear to auscultation - no rales, rhonchi or wheezes  BREAST: normal without masses, tenderness or nipple discharge and no palpable axillary masses or adenopathy  CV: regular rate and rhythm, normal S1 S2, no S3 or S4, no murmur, click or rub, no peripheral edema and peripheral pulses strong  ABDOMEN: soft, nontender, no hepatosplenomegaly, no masses and bowel sounds normal  MS: no musculoskeletal defects are noted and gait is age appropriate without ataxia  SKIN: no suspicious lesions or rashes  NEURO: Normal strength and tone, sensory exam grossly normal, mentation intact and speech normal  PSYCH: mentation appears normal and affect " "normal/bright    Diagnostic Test Results:  Labs reviewed in Epic    ASSESSMENT/PLAN:   1. Routine general medical examination at a health care facility      2. Major depressive disorder, recurrent episode, moderate (H)    - CBC with platelets  - TSH with free T4 reflex    3. Anxiety    - CBC with platelets  - TSH with free T4 reflex    4. Screening for blood disease    - CBC with platelets    5. Screening for thyroid disorder    - TSH with free T4 reflex    6. Screening for diabetes mellitus    - Comprehensive metabolic panel    7. Screening, lipid    - Lipid panel reflex to direct LDL Fasting    8. Visit for screening mammogram    - *MA Screening Digital Bilateral; Future    9. Acute vaginitis    - Wet prep    10. Tinea pedis of both feet    - terbinafine (LAMISIL) 250 MG tablet; Take 1 tablet (250 mg) by mouth daily for 14 days  Dispense: 28 tablet; Refill: 0    11. Maxillary sinusitis, unspecified chronicity    - amoxicillin (AMOXIL) 875 MG tablet; Take 1 tablet (875 mg) by mouth 2 times daily for 10 days  Dispense: 20 tablet; Refill: 0    COUNSELING:  Reviewed preventive health counseling, as reflected in patient instructions       Regular exercise       Healthy diet/nutrition       Vision screening       Hearing screening    Estimated body mass index is 37.9 kg/m  as calculated from the following:    Height as of this encounter: 1.549 m (5' 1\").    Weight as of this encounter: 91 kg (200 lb 9.6 oz).    Weight management plan: Discussed healthy diet and exercise guidelines     reports that she has been smoking cigarettes. She has a 7.50 pack-year smoking history. She has never used smokeless tobacco.      Counseling Resources:  ATP IV Guidelines  Pooled Cohorts Equation Calculator  Breast Cancer Risk Calculator  FRAX Risk Assessment  ICSI Preventive Guidelines  Dietary Guidelines for Americans, 2010  USDA's MyPlate  ASA Prophylaxis  Lung CA Screening    Ramona Ann Aaseby-Aguilera, PA-C  [facility] " [place]    Patient Instructions   (Z00.00) Routine general medical examination at a health care facility  (primary encounter diagnosis)  Comment:   Plan:     (F33.1) Major depressive disorder, recurrent episode, moderate (H)  Comment: please go back on lexapro at 1/2 tablet of 10 mg.  Follow-up in 1 month   Plan: CBC with platelets, TSH with free T4 reflex            (F41.9) Anxiety  Comment: see above   Plan: CBC with platelets, TSH with free T4 reflex            (Z13.0) Screening for blood disease  Comment:   Plan: CBC with platelets            (Z13.29) Screening for thyroid disorder  Comment:   Plan: TSH with free T4 reflex            (Z13.1) Screening for diabetes mellitus  Comment:   Plan: Comprehensive metabolic panel            (Z13.220) Screening, lipid  Comment:   Plan: Lipid panel reflex to direct LDL Fasting            (Z12.31) Visit for screening mammogram  Comment:   Plan: *MA Screening Digital Bilateral            (N76.0) Acute vaginitis  Comment: within normal limits    Plan: Wet prep            (B35.3) Tinea pedis of both feet  Comment: take 1 tab twice daily x 2 weeks   Plan: terbinafine (LAMISIL) 250 MG tablet            (J32.0) Maxillary sinusitis, unspecified chronicity  Comment: 1 tab twice daily x 10 days.   Plan: amoxicillin (AMOXIL) 875 MG tablet                      Preventive Health Recommendations  Female Ages 40 to 49    Yearly exam:     See your health care provider every year in order to  1. Review health changes.   2. Discuss preventive care.    3. Review your medicines if your doctor prescribed any.      Get a Pap test every three years (unless you have an abnormal result and your provider advises testing more often).      If you get Pap tests with HPV test, you only need to test every 5 years, unless you have an abnormal result. You do not need a Pap test if your uterus was removed (hysterectomy) and you have not had cancer.      You should be tested each year for STDs (sexually  transmitted diseases), if you're at risk.     Ask your doctor if you should have a mammogram.      Have a colonoscopy (test for colon cancer) if someone in your family has had colon cancer or polyps before age 50.       Have a cholesterol test every 5 years.       Have a diabetes test (fasting glucose) after age 45. If you are at risk for diabetes, you should have this test every 3 years.    Shots: Get a flu shot each year. Get a tetanus shot every 10 years.     Nutrition:     Eat at least 5 servings of fruits and vegetables each day.    Eat whole-grain bread, whole-wheat pasta and brown rice instead of white grains and rice.    Get adequate Calcium and Vitamin D.      Lifestyle    Exercise at least 150 minutes a week (an average of 30 minutes a day, 5 days a week). This will help you control your weight and prevent disease.    Limit alcohol to one drink per day.    No smoking.     Wear sunscreen to prevent skin cancer.    See your dentist every six months for an exam and cleaning.

## 2020-02-18 NOTE — PATIENT INSTRUCTIONS
(Z00.00) Routine general medical examination at a health care facility  (primary encounter diagnosis)  Comment:   Plan:     (F33.1) Major depressive disorder, recurrent episode, moderate (H)  Comment: please go back on lexapro at 1/2 tablet of 10 mg.  Follow-up in 1 month   Plan: CBC with platelets, TSH with free T4 reflex            (F41.9) Anxiety  Comment: see above   Plan: CBC with platelets, TSH with free T4 reflex            (Z13.0) Screening for blood disease  Comment:   Plan: CBC with platelets            (Z13.29) Screening for thyroid disorder  Comment:   Plan: TSH with free T4 reflex            (Z13.1) Screening for diabetes mellitus  Comment:   Plan: Comprehensive metabolic panel            (Z13.220) Screening, lipid  Comment:   Plan: Lipid panel reflex to direct LDL Fasting            (Z12.31) Visit for screening mammogram  Comment:   Plan: *MA Screening Digital Bilateral            (N76.0) Acute vaginitis  Comment: within normal limits    Plan: Wet prep            (B35.3) Tinea pedis of both feet  Comment: take 1 tab twice daily x 2 weeks   Plan: terbinafine (LAMISIL) 250 MG tablet            (J32.0) Maxillary sinusitis, unspecified chronicity  Comment: 1 tab twice daily x 10 days.   Plan: amoxicillin (AMOXIL) 875 MG tablet                      Preventive Health Recommendations  Female Ages 40 to 49    Yearly exam:     See your health care provider every year in order to  1. Review health changes.   2. Discuss preventive care.    3. Review your medicines if your doctor prescribed any.      Get a Pap test every three years (unless you have an abnormal result and your provider advises testing more often).      If you get Pap tests with HPV test, you only need to test every 5 years, unless you have an abnormal result. You do not need a Pap test if your uterus was removed (hysterectomy) and you have not had cancer.      You should be tested each year for STDs (sexually transmitted diseases), if you're at  risk.     Ask your doctor if you should have a mammogram.      Have a colonoscopy (test for colon cancer) if someone in your family has had colon cancer or polyps before age 50.       Have a cholesterol test every 5 years.       Have a diabetes test (fasting glucose) after age 45. If you are at risk for diabetes, you should have this test every 3 years.    Shots: Get a flu shot each year. Get a tetanus shot every 10 years.     Nutrition:     Eat at least 5 servings of fruits and vegetables each day.    Eat whole-grain bread, whole-wheat pasta and brown rice instead of white grains and rice.    Get adequate Calcium and Vitamin D.      Lifestyle    Exercise at least 150 minutes a week (an average of 30 minutes a day, 5 days a week). This will help you control your weight and prevent disease.    Limit alcohol to one drink per day.    No smoking.     Wear sunscreen to prevent skin cancer.    See your dentist every six months for an exam and cleaning.

## 2020-02-19 ENCOUNTER — MYC MEDICAL ADVICE (OUTPATIENT)
Dept: FAMILY MEDICINE | Facility: CLINIC | Age: 50
End: 2020-02-19

## 2020-02-24 ENCOUNTER — DOCUMENTATION ONLY (OUTPATIENT)
Dept: CARE COORDINATION | Facility: CLINIC | Age: 50
End: 2020-02-24

## 2020-03-03 ENCOUNTER — PRE VISIT (OUTPATIENT)
Dept: UROLOGY | Facility: CLINIC | Age: 50
End: 2020-03-03

## 2020-03-03 NOTE — TELEPHONE ENCOUNTER
Chief Complaint :incontinence     Records/Orders: no records self referring     Pt Contacted: no    At Rooming: normal

## 2020-03-18 ENCOUNTER — E-VISIT (OUTPATIENT)
Dept: FAMILY MEDICINE | Facility: CLINIC | Age: 50
End: 2020-03-18
Payer: MEDICAID

## 2020-03-18 DIAGNOSIS — F41.9 ANXIETY: ICD-10-CM

## 2020-03-18 DIAGNOSIS — F33.1 MAJOR DEPRESSIVE DISORDER, RECURRENT EPISODE, MODERATE (H): ICD-10-CM

## 2020-03-18 DIAGNOSIS — Z63.4 BEREAVEMENT, UNCOMPLICATED: ICD-10-CM

## 2020-03-18 DIAGNOSIS — F33.1 MAJOR DEPRESSIVE DISORDER, RECURRENT EPISODE, MODERATE (H): Primary | ICD-10-CM

## 2020-03-18 DIAGNOSIS — F41.1 GENERALIZED ANXIETY DISORDER: ICD-10-CM

## 2020-03-18 PROCEDURE — 99421 OL DIG E/M SVC 5-10 MIN: CPT | Performed by: PHYSICIAN ASSISTANT

## 2020-03-18 SDOH — SOCIAL STABILITY - SOCIAL INSECURITY: DISSAPEARANCE AND DEATH OF FAMILY MEMBER: Z63.4

## 2020-03-18 ASSESSMENT — ANXIETY QUESTIONNAIRES
5. BEING SO RESTLESS THAT IT IS HARD TO SIT STILL: NOT AT ALL
3. WORRYING TOO MUCH ABOUT DIFFERENT THINGS: MORE THAN HALF THE DAYS
1. FEELING NERVOUS, ANXIOUS, OR ON EDGE: NEARLY EVERY DAY
GAD7 TOTAL SCORE: 13
7. FEELING AFRAID AS IF SOMETHING AWFUL MIGHT HAPPEN: NEARLY EVERY DAY
4. TROUBLE RELAXING: SEVERAL DAYS
GAD7 TOTAL SCORE: 13
GAD7 TOTAL SCORE: 13
6. BECOMING EASILY ANNOYED OR IRRITABLE: MORE THAN HALF THE DAYS
2. NOT BEING ABLE TO STOP OR CONTROL WORRYING: MORE THAN HALF THE DAYS
7. FEELING AFRAID AS IF SOMETHING AWFUL MIGHT HAPPEN: NEARLY EVERY DAY

## 2020-03-18 ASSESSMENT — PATIENT HEALTH QUESTIONNAIRE - PHQ9
SUM OF ALL RESPONSES TO PHQ QUESTIONS 1-9: 6
10. IF YOU CHECKED OFF ANY PROBLEMS, HOW DIFFICULT HAVE THESE PROBLEMS MADE IT FOR YOU TO DO YOUR WORK, TAKE CARE OF THINGS AT HOME, OR GET ALONG WITH OTHER PEOPLE: NOT DIFFICULT AT ALL
SUM OF ALL RESPONSES TO PHQ QUESTIONS 1-9: 6

## 2020-03-18 NOTE — TELEPHONE ENCOUNTER
MEDICAL RECORDS REQUEST   [facility]  Urology Clinic  [address] [place] [address]  PHONE: [phone]  Fax: [phone]        FUTURE VISIT INFORMATION                                                   Jessie Seymour, : 1970 scheduled for future visit at [facility]    APPOINTMENT INFORMATION:    Date: 20 12PM    Provider:  Joana VARGAS    Reason for Visit/Diagnosis: Weak bladder     REFERRAL INFORMATION:    Referring provider:  AASEBY-AGUILERA, RAMONA ANN    Specialty: MD    Referring providers clinic:  [facility]    Clinic contact number:  [phone]    RECORDS REQUESTED FOR VISIT                                                     NOTES  STATUS/DETAILS   OFFICE NOTE from referring provider  yes   OFFICE NOTE from other specialist  no   DISCHARGE SUMMARY from hospital  no   DISCHARGE REPORT from the ER  no   OPERATIVE REPORT  no   MEDICATION LIST  no   LABS     URINALYSIS (UA)  yes     PRE-VISIT CHECKLIST      Record collection complete Yes- Internal recs in CE    Appointment appropriately scheduled           (right time/right provider) Yes   MyChart activation Yes   Questionnaire complete If no, please explain: In process      Completed by: Mima Hinojosa

## 2020-03-19 RX ORDER — ESCITALOPRAM OXALATE 20 MG/1
20 TABLET ORAL DAILY
Qty: 30 TABLET | Refills: 1 | Status: SHIPPED | OUTPATIENT
Start: 2020-03-19 | End: 2020-09-23

## 2020-03-19 ASSESSMENT — PATIENT HEALTH QUESTIONNAIRE - PHQ9: SUM OF ALL RESPONSES TO PHQ QUESTIONS 1-9: 6

## 2020-03-19 ASSESSMENT — ANXIETY QUESTIONNAIRES: GAD7 TOTAL SCORE: 13

## 2020-03-19 NOTE — TELEPHONE ENCOUNTER
Provider E-Visit time total (minutes): 8  Tracee was encouraged to increase Lexapro up to 20 mg.  If she has run out for a few days to start with a half a tablet until symptoms resolve and then increase to 20.  She was told to follow-up in 1 month

## 2020-03-19 NOTE — PATIENT INSTRUCTIONS
Depression: Tips to Help Yourself    As your healthcare providers help treat your depression, you can also help yourself. Keep in mind that your illness affects you emotionally, physically, mentally, and socially. So full recovery will take time. Take care of your body and your soul, and be patient with yourself as you get better.  Self-care    Educate yourself. Read about treatment and medicine options. If you have the energy, attend local conferences or support groups. Keep a list of useful websites and helpful books and use them as needed. This illness is not your fault. Don t blame yourself for your depression.    Manage early symptoms. If you notice symptoms returning, experience triggers, or identify other factors that may lead to a depressive episode, get help as soon as possible. Ask trusted friends and family to monitor your behavior and let you know if they see anything of concern.    Work with your provider. Find a provider you can trust. Communicate honestly with that person and share information on your treatment for depression and your reaction to medicines.    Be prepared for a crisis. Know what to do if you experience a crisis. Keep the phone number of a crisis hotline and know the location of your community's urgent care centers and the closest emergency department.    Hold off on big decisions. Depression can cloud your judgment. So wait until you feel better before making major life decisions, such as changing jobs, moving, or getting  or .    Be patient. Recovering from depression is a process. Don t be discouraged if it takes some time to feel better.    Keep it simple. Depression saps your energy and concentration. So you won t be able to do all the things you used to do. Set small goals and do what you can.    Be with others. Don t isolate yourself--you ll only feel worse. Try to be with other people. And take part in fun activities when you can. Go to a movie, ballgame,  Scientology service, or social event. Talk openly with people you can trust. And accept help when it s offered.  Take care of your body  People with depression often lose the desire to take care of themselves. That only makes their problems worse. During treatment and afterward, make a point to:    Exercise. It s a great way to take care of your body. And studies have shown that exercise helps fight depression.    Avoid drugs and alcohol. These may ease the pain in the short term. But they ll only make your problems worse in the long run.    Get relief from stress. Ask your healthcare provider for relaxation exercises and techniques to help relieve stress.    Eat right. A balanced and healthy diet helps keep your body healthy.  Date Last Reviewed: 1/1/2017 2000-2019 [employer]. [address] [place] [address], [place] [address]. All rights reserved. This information is not intended as a substitute for professional medical care. Always follow your healthcare professional's instructions.          Depression Affects Your Mind and Body  Everyone feels sad or  blue  from time to time for a few days or weeks. Depression is when these feelings don't go away and they interfere with daily life.  Depression is a real illness that can develop at any age. It is one of the most common mental health problems in the [place] Depression makes you feel sad, helpless, and hopeless. It gets in the way of your life and relationships. It inhibits your ability to think and act. But, with help, you can feel better again.      When I was depressed, I felt awful. I was so tired all the time I could hardly think, but at night I couldn t fall asleep. My head hurt. My stomach hurt. I didn t know what was wrong with me.    Depression affects your whole body  Brain chemicals affect your body as well as your mood. So depression may do more than just make you feel low. You may also feel bad physically. Depression can:    Cause trouble with  mental tasks such as remembering, concentrating, or making decisions    Make you feel nervous and jumpy    Cause trouble sleeping. Or you may sleep too much    Change your appetite    Cause headaches, stomachaches, or other aches and pains    Drain your body of energy  Depression and other illness  It is common for people who have chronic health problems to also have depression. It can often be hard to tell which one caused the other. A person might become depressed after finding out they have a health problem. But some studies suggest being depressed may make certain health problems more likely. And some depressed people stop taking care of themselves. This may make them more likely to get sick.  Date Last Reviewed: 1/1/2017 2000-2019 [employer]. [address] [place] [address], [place] [address]. All rights reserved. This information is not intended as a substitute for professional medical care. Always follow your healthcare professional's instructions.

## 2020-04-06 ENCOUNTER — NURSE TRIAGE (OUTPATIENT)
Dept: NURSING | Facility: CLINIC | Age: 50
End: 2020-04-06

## 2020-04-06 ENCOUNTER — VIRTUAL VISIT (OUTPATIENT)
Dept: FAMILY MEDICINE | Facility: CLINIC | Age: 50
End: 2020-04-06
Payer: COMMERCIAL

## 2020-04-06 DIAGNOSIS — J02.9 PHARYNGITIS, UNSPECIFIED ETIOLOGY: ICD-10-CM

## 2020-04-06 DIAGNOSIS — J45.21 MILD INTERMITTENT ASTHMA WITH ACUTE EXACERBATION: Primary | ICD-10-CM

## 2020-04-06 DIAGNOSIS — R19.7 DIARRHEA, UNSPECIFIED TYPE: ICD-10-CM

## 2020-04-06 PROCEDURE — 99441 ZZC PHYSICIAN TELEPHONE EVALUATION 5-10 MIN: CPT | Performed by: PHYSICIAN ASSISTANT

## 2020-04-06 RX ORDER — PREDNISONE 10 MG/1
10 TABLET ORAL DAILY
COMMUNITY
End: 2020-09-23

## 2020-04-06 NOTE — PROGRESS NOTES
"Subjective     Jessie Seymour is a 49 year old female who is being evaluated via a billable telephone visit.      The patient has been notified of following:     \"This telephone visit will be conducted via a call between you and your physician/provider. We have found that certain health care needs can be provided without the need for a physical exam.  This service lets us provide the care you need with a short phone conversation.  If a prescription is necessary we can send it directly to your pharmacy.  If lab work is needed we can place an order for that and you can then stop by our lab to have the test done at a later time.    If during the course of the call the physician/provider feels a telephone visit is not appropriate, you will not be charged for this service.\"     Patient has given verbal consent for Telephone visit?  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  Has a headache and sore throat and feels like  Chest is tight. Had left over prednisone 10 mg cari she has been taking for the past few days.      Denies fever or cough    ALLERGIES  Hydrocodone-acetaminophen; Loratadine; Morphine; Oxycodone; Zofran [ondansetron]; and Codeine    Acute Illness   Acute illness concerns: tired   Onset: 4 to 5 days ago     Fever: no     Chills/Sweats: YES-  sweats     Headache (location?): YES    Sinus Pressure:no    Conjunctivitis:  no    Ear Pain: no    Rhinorrhea: no     Congestion: post nasal drip    Sore Throat: no     Cough: no    Wheeze: no     Decreased Appetite: no    Nausea: yes    Vomiting: no    Diarrhea:  YES    Dysuria/Freq.: no     Fatigue/Achiness: YES    Sick/Strep Exposure: no      Therapies Tried and outcome:              Current Outpatient Medications   Medication Sig Dispense Refill     acetaminophen (TYLENOL) 325 MG tablet Take 325-650 mg by mouth       albuterol (2.5 MG/3ML) 0.083% nebulizer solution Take 1 " vial (2.5 mg) by nebulization every 6 hours as needed for shortness of breath / dyspnea 3 Box 1     albuterol (PROAIR HFA) 108 (90 Base) MCG/ACT inhaler Inhale 2 puffs into the lungs every 4 hours as needed for shortness of breath / dyspnea or wheezing 1 Inhaler 1     aspirin 81 MG tablet Take 81 mg by mouth daily        diphenhydrAMINE (BENADRYL) 25 MG tablet Take 1-2 tablets (25-50 mg) by mouth every 6 hours as needed for itching or allergies 60 tablet 1     escitalopram (LEXAPRO) 10 MG tablet Take 1 tablet (10 mg) by mouth daily 30 tablet 1     fluticasone (FLONASE) 50 MCG/ACT nasal spray Spray 1-2 sprays into both nostrils daily 1 g 11     hydrocortisone valerate (WEST-LESLY) 0.2 % external ointment Apply sparingly to affected area three times daily as needed. 60 g 0     ibuprofen (ADVIL,MOTRIN) 600 MG tablet Take 1 tablet (600 mg) by mouth every 6 hours as needed for moderate pain 90 tablet 1     ipratropium - albuterol 0.5 mg/2.5 mg/3 mL (DUONEB) 0.5-2.5 (3) MG/3ML neb solution Take 1 vial (3 mLs) by nebulization every 6 hours as needed for shortness of breath / dyspnea or wheezing 1 Box 0     ketorolac (TORADOL) 10 MG tablet Take 1 tablet (10 mg) by mouth every 6 hours as needed for pain 4 tablet 0     melatonin 3 MG tablet Take 3 mg by mouth       methocarbamol (ROBAXIN) 500 MG tablet Take 1 tablet (500 mg) by mouth 4 times daily as needed for muscle spasms 30 tablet 0     nortriptyline (PAMELOR) 10 MG capsule Take 1 capsule (10 mg) by mouth At Bedtime Every 3 days 30 capsule 1     omeprazole (PRILOSEC) 10 MG DR capsule Take 10 mg by mouth       predniSONE (DELTASONE) 10 MG tablet Take 10 mg by mouth daily       promethazine (PHENERGAN) 25 MG tablet Take 1 tablet (25 mg) by mouth every 8 hours as needed for nausea 10 tablet 0     albuterol (PROAIR HFA) 108 (90 Base) MCG/ACT inhaler Inhale 2 puffs into the lungs every 4 hours as needed for shortness of breath / dyspnea 1 Inhaler 0     camphor-menthol (SARNA)  0.5-0.5 % external lotion Apply 1 mL topically every 6 hours as needed for skin care Apply to palms of hands 2-3 times daily 59 mL 1     escitalopram (LEXAPRO) 20 MG tablet Take 1 tablet (20 mg) by mouth daily (Patient not taking: Reported on 4/6/2020) 30 tablet 1     LORazepam (ATIVAN) 0.5 MG tablet Take 1 tablet (0.5 mg) by mouth every 6 hours as needed for anxiety (Patient not taking: Reported on 4/6/2020) 20 tablet 0     Recent Labs   Lab Test 02/18/20  0907 09/29/19  0341 09/27/19  2315  03/07/17  0826  12/26/15  1007   *  --   --   --  94  --  94   HDL 30*  --   --   --  35*  --  27*   TRIG 162*  --   --   --  110  --  166*   ALT 20 19 26  --  19  --  12   CR 0.80 0.86 0.84   < > 0.82   < > 0.85   GFRESTIMATED 87 80 82   < > 76   < > 72   GFRESTBLACK >90 >90 >90   < > >90  African American GFR Calc     < > 87   POTASSIUM 4.0 3.8 3.6   < > 4.0   < > 4.2   TSH 1.10  --   --   --   --   --  1.11    < > = values in this interval not displayed.      BP Readings from Last 3 Encounters:   02/18/20 120/70   02/04/20 128/80   01/27/20 132/72    Wt Readings from Last 3 Encounters:   02/18/20 91 kg (200 lb 9.6 oz)   02/04/20 91.9 kg (202 lb 8 oz)   01/27/20 91.6 kg (202 lb)                    Reviewed and updated as needed this visit by Provider         Review of Systems   ROS COMP: Constitutional, HEENT, cardiovascular, pulmonary, gi and gu systems are negative, except as otherwise noted.         Assessment/Plan:  1. Mild intermittent asthma with acute exacerbation  Advised to stop prednisone and use albuterol every 4 hours    2. Diarrhea, unspecified type  Brat diet and can use immodium if needed.     3. Pharyngitis, unspecified etiology  Salt water gargles    Advised to call if symptoms worsening       No follow-ups on file.      Phone call duration:  8 minutes    Ramona Ann Aaseby-Aguilera, PA-C

## 2020-04-06 NOTE — TELEPHONE ENCOUNTER
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  She has a headache this morning, feels constantly tired, sore throat with some sob with ADLs, lungs feel tight in lower lobes, denies fever or cough, she wants to be tested for COVID, unsure if this is Asthma exacerbation, stress related, or should she be tested for COVID.  Protocols recommend evaluation in 24 hours, phone appointment with PCP set up for tomorrow.    Jennifer Spencer RN - [place] Nurse Advisor  04/06/2020   4:21    Reason for Disposition    [1] MILD longstanding difficulty breathing AND [2]  SAME as normal    Additional Information    Negative: [1] Breathing stopped AND [2] hasn't returned    Negative: Choking on something    Negative: Severe difficulty breathing (e.g., struggling for each breath, speaks in single words)    Negative: Bluish (or gray) lips or face now    Negative: Difficult to awaken or acting confused (e.g., disoriented, slurred speech)    Negative: Passed out (i.e., lost consciousness, collapsed and was not responding)    Negative: Wheezing started suddenly after medicine, an allergic food or bee sting    Negative: Stridor    Negative: Slow, shallow and weak breathing    Negative: Sounds like a life-threatening emergency to the triager    Negative: [1] MODERATE difficulty breathing (e.g., speaks in phrases, SOB even at rest, pulse 100-120) AND [2] NEW-onset or WORSE than normal    Negative: Wheezing can be heard across the room    Negative: Drooling or spitting out saliva (because can't swallow)    Negative: History of prior \"blood clot\" in leg or lungs (i.e., deep vein thrombosis, pulmonary embolism)    Negative: History of inherited " "increased risk of blood clots (e.g., Factor 5 Leiden, Anti-thrombin 3, Protein C or Protein S deficiency, Prothrombin mutation)    Negative: Recent illness requiring prolonged bedrest (i.e., immobilization)    Negative: Hip or leg fracture in past 2 months (e.g., had cast on leg or ankle)    Negative: Major surgery in the past month    Negative: Recent long-distance travel with prolonged time in car, bus, plane, or train (i.e., within past 2 weeks; 6 or  more hours duration)    Negative: Extra heart beats OR irregular heart beating   (i.e., \"palpitations\")    Negative: Fever > 103 F (39.4 C)    Negative: [1] Fever > 101 F (38.3 C) AND [2] age > 60    Negative: [1] Fever > 100.0 F (37.8 C) AND [2] bedridden (e.g., nursing home patient, CVA, chronic illness, recovering from surgery)    Negative: [1] Fever > 100.0 F (37.8 C) AND [2] diabetes mellitus or weak immune system (e.g., HIV positive, cancer chemo, splenectomy, chronic steroids)    Negative: [1] Periods where breathing stops and then resumes normally AND [2] bedridden (e.g., nursing home patient, CVA)    Negative: Pregnant or postpartum (< 1 month since delivery)    Negative: Patient sounds very sick or weak to the triager    Negative: [1] MODERATE longstanding difficulty breathing (e.g., speaks in phrases, SOB even at rest, pulse 100-120) AND [2] SAME as normal    Negative: [1] MILD difficulty breathing (e.g., minimal/no SOB at rest, SOB with walking, pulse <100) AND [2] NEW-onset or WORSE than normal    Negative: [1] Longstanding difficulty breathing (e.g., CHF, COPD, emphysema) AND [2] WORSE than normal    Negative: [1] Longstanding difficulty breathing AND [2] not responding to usual therapy    Negative: [1] Continuous (nonstop) coughing AND [2] keeps from working or sleeping    Protocols used: BREATHING DIFFICULTY-A-AH    COVID 19 Nurse Triage Plan/Patient Instructions    Please be aware that novel coronavirus (COVID-19) may be circulating in the " community. If you develop symptoms such as fever, cough, or SOB or if you have concerns about the presence of another infection including coronavirus (COVID-19), please contact your health care provider or visit [email or web address].     Disposition/Instructions    Patient to stay at home and follow home care protocol based instructions.     Thank you for limiting contact with others, wearing a simple mask to cover your cough, practice good hand hygiene habits and accessing our virtual services where possible to limit the spread of this virus.    For more information about COVID19 and options for caring for yourself at home, please visit the CDC website at https://www.cdc.gov/coronavirus/2019-ncov/about/steps-when-sick.html  For more options for care at [facility], please visit our website at [email or web address]    For more information, please use the [place] Department of Health ([facility]) COVID-19 Hotlines (Interpreters available):     Health questions: Phone Number: [phone] or [phone] and Hours: 7 a.m. to 7 p.m.    Schools and  questions: Phone Number: [phone] or [phone] and Hours 7 a.m. to 7 p.m.

## 2020-04-06 NOTE — LETTER
April 6, 2020      Tanesha Seymour  [address]  [place] [address]        To Whom It May Concern,       Tanesha  had a online visit as WE ARE NOT recommending any patients that may have COVID19 to come to any health care facility. Here are her recommendations that we have instructed tanesha to follow.           Regardless of if you have been tested or not:  Patient who have symptoms (cough, fever, or shortness of breath), need to isolate for 7 days from when symptoms started OR 72 hours after fever resolves (without fever reducing medications) AND improvement of respiratory symptoms (whichever is longer).       Isolate yourself at home (in own room/own bathroom if possible)    Do Not allow any visitors    Do Not go to work or school    Do Not go to Denominational,  centers, shopping, or other public places.    Do Not shake hands.    Avoid close and intimate contact with others (hugging, kissing).    Follow CDC recommendations for household cleaning of frequently touched services.      After the initial 7 days, continue to isolate yourself from household members as much as possible. To continue decrease the risk of community spread and exposure, you and any members of your household should limit activities in public for 14 days after starting home isolation.      Please let us know if we can be of further assistance.       Sincerely,      Ramona Ann Aaseby-Aguilera, PA-C

## 2020-04-07 ENCOUNTER — TRANSFERRED RECORDS (OUTPATIENT)
Dept: HEALTH INFORMATION MANAGEMENT | Facility: CLINIC | Age: 50
End: 2020-04-07

## 2020-04-07 LAB
ALT SERPL-CCNC: 12 U/L (ref 0–45)
AST SERPL-CCNC: 13 U/L (ref 0–40)
CREAT SERPL-MCNC: 0.96 MG/DL (ref 0.6–1.1)
GFR SERPL CREATININE-BSD FRML MDRD: >60 ML/MIN/1.73M2
GLUCOSE SERPL-MCNC: 103 MG/DL (ref 70–125)
POTASSIUM SERPL-SCNC: 4.7 MMOL/L (ref 3.5–5)

## 2020-04-21 ENCOUNTER — TELEPHONE (OUTPATIENT)
Dept: UROLOGY | Facility: CLINIC | Age: 50
End: 2020-04-21

## 2020-04-21 NOTE — TELEPHONE ENCOUNTER
Left voice mail message for patient to call Urology clinic back to discuss moving appointment up earlier and changing it to a Video Visit.     If patient calls back, please offer to change appointment to Video Visit and offer a sooner appointment with patient's provider.    Natasha Sharpe CMA   04/21/20  1:11 PM

## 2020-05-11 ENCOUNTER — PRE VISIT (OUTPATIENT)
Dept: UROLOGY | Facility: CLINIC | Age: 50
End: 2020-05-11

## 2020-05-11 NOTE — TELEPHONE ENCOUNTER
Chief Complaint :incontinence      Records/Orders: no records self referring      Pt Contacted: YES talk to her about appointment      At Room[facility]: phone

## 2020-05-26 ENCOUNTER — PRE VISIT (OUTPATIENT)
Dept: UROLOGY | Facility: CLINIC | Age: 50
End: 2020-05-26

## 2020-05-26 ENCOUNTER — TELEPHONE (OUTPATIENT)
Dept: UROLOGY | Facility: CLINIC | Age: 50
End: 2020-05-26

## 2020-05-26 ENCOUNTER — VIRTUAL VISIT (OUTPATIENT)
Dept: UROLOGY | Facility: CLINIC | Age: 50
End: 2020-05-26

## 2020-05-26 DIAGNOSIS — N39.3 FEMALE STRESS INCONTINENCE: Primary | ICD-10-CM

## 2020-05-26 ASSESSMENT — PAIN SCALES - GENERAL: PAINLEVEL: NO PAIN (0)
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
## 2020-09-16 ENCOUNTER — OFFICE VISIT (OUTPATIENT)
Dept: OBGYN | Facility: CLINIC | Age: 50
End: 2020-09-16
Payer: COMMERCIAL

## 2020-09-16 ENCOUNTER — PREP FOR PROCEDURE (OUTPATIENT)
Dept: OBGYN | Facility: CLINIC | Age: 50
End: 2020-09-16

## 2020-09-16 ENCOUNTER — TELEPHONE (OUTPATIENT)
Dept: OBGYN | Facility: CLINIC | Age: 50
End: 2020-09-16

## 2020-09-16 VITALS
HEIGHT: 61 IN | DIASTOLIC BLOOD PRESSURE: 82 MMHG | SYSTOLIC BLOOD PRESSURE: 128 MMHG | WEIGHT: 208 LBS | BODY MASS INDEX: 39.27 KG/M2

## 2020-09-16 DIAGNOSIS — R10.32 LLQ ABDOMINAL PAIN: ICD-10-CM

## 2020-09-16 DIAGNOSIS — E66.812 CLASS 2 OBESITY DUE TO EXCESS CALORIES WITHOUT SERIOUS COMORBIDITY WITH BODY MASS INDEX (BMI) OF 39.0 TO 39.9 IN ADULT: ICD-10-CM

## 2020-09-16 DIAGNOSIS — K57.32 DIVERTICULITIS OF COLON: ICD-10-CM

## 2020-09-16 DIAGNOSIS — Z23 NEED FOR PROPHYLACTIC VACCINATION AND INOCULATION AGAINST INFLUENZA: ICD-10-CM

## 2020-09-16 DIAGNOSIS — N75.0 BARTHOLIN'S CYST: Primary | ICD-10-CM

## 2020-09-16 DIAGNOSIS — E66.09 CLASS 2 OBESITY DUE TO EXCESS CALORIES WITHOUT SERIOUS COMORBIDITY WITH BODY MASS INDEX (BMI) OF 39.0 TO 39.9 IN ADULT: ICD-10-CM

## 2020-09-16 PROCEDURE — 90471 IMMUNIZATION ADMIN: CPT | Performed by: OBSTETRICS & GYNECOLOGY

## 2020-09-16 PROCEDURE — 90686 IIV4 VACC NO PRSV 0.5 ML IM: CPT | Performed by: OBSTETRICS & GYNECOLOGY

## 2020-09-16 PROCEDURE — 99203 OFFICE O/P NEW LOW 30 MIN: CPT | Mod: 25 | Performed by: OBSTETRICS & GYNECOLOGY

## 2020-09-16 ASSESSMENT — ENCOUNTER SYMPTOMS
ABDOMINAL PAIN: 1
DYSURIA: 0
CHILLS: 0
DIARRHEA: 1
FEVER: 0
HEMATURIA: 0
VOMITING: 0
NAUSEA: 0
HEARTBURN: 0
FREQUENCY: 0

## 2020-09-16 ASSESSMENT — MIFFLIN-ST. JEOR: SCORE: 1510.86

## 2020-09-16 NOTE — LETTER
[place] [address] [facility]  [address]  [facility] [address]  Phone: [phone]  Fax: [phone]    09/16/20    Jessie Seymour  [address] [facility] [place] [address]      To whom it may concern:     Jessie Seymour is a patient under my care, she was seen in our clinic today for an appointment that caused her to miss work today.    If you have any questions please contact myself or my nurse.     Sincerely,      Chris Freeman MD

## 2020-09-16 NOTE — LETTER
September 16, 2020      RE: Jessie Seymour                                                                       To Whom it May Concern    Jessie Seymour was seen at this clinic today.  Please excuse her from work due to a confidential medical reason.         Sincerely,          Chris Freeman MD
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
## 2020-09-16 NOTE — LETTER
[facility]  [address]  [address]  [facility] [address]  [phone]  Dept: [phone]            Jessie Seymour  [address] [facility] [place] [address]            To Whom it May Concern:     Jessie Seymour, female, 1970 is a patient of mine, and she received her flu shot while at her appointment today.         Please contact me for questions or concerns.        Sincerely,        _______________________  Chris Freeman MD  [facility]

## 2020-09-16 NOTE — TELEPHONE ENCOUNTER
Procedure name(s) - multi select  Marsupialization of left Bartholins cyst, possible excision    Reason for procedure  Left Bartholins cyst    Surgeon:  Pete    Is this a multi surgeon case?  No    Laterality  Left    Request for additional equipment  Other (see comments)    Comment: None    Anesthesia  General    Initiate Pre-op orders for above procedure:  Yes, as ordered in Epic    Comment: Additional orders noted there also    Location of Case:  [facility]    Urgency of Surgery:  Routine    Surgeon Procedure Time (incision to closure) in minutes (per procedure as applicable)  60    Note:  Surgical Case Time Needed (in minutes)    Patient Class (for admit prior to surgery, specify number of days in comments):  Same day (hospital outpatient)    Why can t this outpatient surgery be done at the [facility] or  ASC?  N/A    Vendor Needed?  No

## 2020-09-16 NOTE — NURSING NOTE
"Chief Complaint   Patient presents with     Follow Up     CT scan showed cyst        Initial /82 (BP Location: Right arm, Patient Position: Sitting, Cuff Size: Adult Large)   Ht 1.549 m (5' 1\")   Wt 94.3 kg (208 lb)   LMP 2016 (Exact Date)   BMI 39.30 kg/m   Estimated body mass index is 39.3 kg/m  as calculated from the following:    Height as of this encounter: 1.549 m (5' 1\").    Weight as of this encounter: 94.3 kg (208 lb).  BP completed using cuff size: large    Questioned patient about current smoking habits.  Pt. has never smoked.          The following HM Due: NONE    Martin Stewart CMA                "

## 2020-09-18 DIAGNOSIS — Z11.59 ENCOUNTER FOR SCREENING FOR OTHER VIRAL DISEASES: Primary | ICD-10-CM

## 2020-09-18 NOTE — TELEPHONE ENCOUNTER
Type of surgery: marsupialization of left bartholins cyst, possible excision  Location of surgery: [facility]  Date and time of surgery: 10/13/20 @ 7:30 am  Surgeon: Dr. Freeman  Pre-Op Appt Date: Patient advised to schedule.  Post-Op Appt Date: n/a   Packet sent out: Yes  Pre-cert/Authorization completed:  No  Date: 9/18/20

## 2020-09-23 ENCOUNTER — OFFICE VISIT (OUTPATIENT)
Dept: PEDIATRICS | Facility: CLINIC | Age: 50
End: 2020-09-23
Payer: COMMERCIAL

## 2020-09-23 VITALS
HEART RATE: 78 BPM | DIASTOLIC BLOOD PRESSURE: 65 MMHG | BODY MASS INDEX: 39.28 KG/M2 | RESPIRATION RATE: 20 BRPM | SYSTOLIC BLOOD PRESSURE: 111 MMHG | OXYGEN SATURATION: 98 % | HEIGHT: 61 IN | TEMPERATURE: 97.7 F | WEIGHT: 208.06 LBS

## 2020-09-23 DIAGNOSIS — Z01.810 PRE-OPERATIVE CARDIOVASCULAR EXAMINATION: Primary | ICD-10-CM

## 2020-09-23 DIAGNOSIS — R10.32 LLQ ABDOMINAL PAIN: ICD-10-CM

## 2020-09-23 DIAGNOSIS — N75.0 BARTHOLIN CYST: ICD-10-CM

## 2020-09-23 DIAGNOSIS — F51.01 PRIMARY INSOMNIA: ICD-10-CM

## 2020-09-23 DIAGNOSIS — K57.32 DIVERTICULITIS OF COLON: ICD-10-CM

## 2020-09-23 DIAGNOSIS — G43.009 MIGRAINE WITHOUT AURA AND WITHOUT STATUS MIGRAINOSUS, NOT INTRACTABLE: ICD-10-CM

## 2020-09-23 DIAGNOSIS — R10.2 FEMALE PELVIC PAIN: ICD-10-CM

## 2020-09-23 DIAGNOSIS — M54.2 NECK PAIN: ICD-10-CM

## 2020-09-23 DIAGNOSIS — F32.0 MILD MAJOR DEPRESSION (H): ICD-10-CM

## 2020-09-23 DIAGNOSIS — L29.9 ITCHING: ICD-10-CM

## 2020-09-23 DIAGNOSIS — D56.0 ALPHA-THALASSEMIA (H): ICD-10-CM

## 2020-09-23 LAB
ALBUMIN SERPL-MCNC: 3.6 G/DL (ref 3.4–5)
ALP SERPL-CCNC: 114 U/L (ref 40–150)
ALT SERPL W P-5'-P-CCNC: 21 U/L (ref 0–50)
ANION GAP SERPL CALCULATED.3IONS-SCNC: 5 MMOL/L (ref 3–14)
AST SERPL W P-5'-P-CCNC: 18 U/L (ref 0–45)
BASOPHILS # BLD AUTO: 0 10E9/L (ref 0–0.2)
BASOPHILS NFR BLD AUTO: 0.2 %
BILIRUB SERPL-MCNC: 0.3 MG/DL (ref 0.2–1.3)
BUN SERPL-MCNC: 10 MG/DL (ref 7–30)
CALCIUM SERPL-MCNC: 9.3 MG/DL (ref 8.5–10.1)
CHLORIDE SERPL-SCNC: 109 MMOL/L (ref 94–109)
CO2 SERPL-SCNC: 25 MMOL/L (ref 20–32)
CREAT SERPL-MCNC: 0.91 MG/DL (ref 0.52–1.04)
DIFFERENTIAL METHOD BLD: ABNORMAL
EOSINOPHIL # BLD AUTO: 0.2 10E9/L (ref 0–0.7)
EOSINOPHIL NFR BLD AUTO: 2.2 %
ERYTHROCYTE [DISTWIDTH] IN BLOOD BY AUTOMATED COUNT: 15.9 % (ref 10–15)
GFR SERPL CREATININE-BSD FRML MDRD: 74 ML/MIN/{1.73_M2}
GLUCOSE SERPL-MCNC: 103 MG/DL (ref 70–99)
HCG UR QL: NEGATIVE
HCT VFR BLD AUTO: 40 % (ref 35–47)
HGB BLD-MCNC: 12.9 G/DL (ref 11.7–15.7)
IRON SATN MFR SERPL: 24 % (ref 15–46)
IRON SERPL-MCNC: 77 UG/DL (ref 35–180)
LYMPHOCYTES # BLD AUTO: 5 10E9/L (ref 0.8–5.3)
LYMPHOCYTES NFR BLD AUTO: 56.4 %
MCH RBC QN AUTO: 22.7 PG (ref 26.5–33)
MCHC RBC AUTO-ENTMCNC: 32.3 G/DL (ref 31.5–36.5)
MCV RBC AUTO: 70 FL (ref 78–100)
MONOCYTES # BLD AUTO: 0.5 10E9/L (ref 0–1.3)
MONOCYTES NFR BLD AUTO: 6.1 %
NEUTROPHILS # BLD AUTO: 3.1 10E9/L (ref 1.6–8.3)
NEUTROPHILS NFR BLD AUTO: 35.1 %
PLATELET # BLD AUTO: 303 10E9/L (ref 150–450)
POTASSIUM SERPL-SCNC: 3.4 MMOL/L (ref 3.4–5.3)
PROT SERPL-MCNC: 7.6 G/DL (ref 6.8–8.8)
RBC # BLD AUTO: 5.68 10E12/L (ref 3.8–5.2)
SODIUM SERPL-SCNC: 139 MMOL/L (ref 133–144)
TIBC SERPL-MCNC: 319 UG/DL (ref 240–430)
WBC # BLD AUTO: 8.8 10E9/L (ref 4–11)

## 2020-09-23 PROCEDURE — 80053 COMPREHEN METABOLIC PANEL: CPT | Performed by: FAMILY MEDICINE

## 2020-09-23 PROCEDURE — 83550 IRON BINDING TEST: CPT | Performed by: FAMILY MEDICINE

## 2020-09-23 PROCEDURE — 81025 URINE PREGNANCY TEST: CPT | Performed by: FAMILY MEDICINE

## 2020-09-23 PROCEDURE — 83540 ASSAY OF IRON: CPT | Performed by: FAMILY MEDICINE

## 2020-09-23 PROCEDURE — 99214 OFFICE O/P EST MOD 30 MIN: CPT | Performed by: FAMILY MEDICINE

## 2020-09-23 PROCEDURE — 85025 COMPLETE CBC W/AUTO DIFF WBC: CPT | Performed by: FAMILY MEDICINE

## 2020-09-23 PROCEDURE — 96127 BRIEF EMOTIONAL/BEHAV ASSMT: CPT | Performed by: FAMILY MEDICINE

## 2020-09-23 PROCEDURE — 36415 COLL VENOUS BLD VENIPUNCTURE: CPT | Performed by: FAMILY MEDICINE

## 2020-09-23 RX ORDER — CLONAZEPAM 0.25 MG/1
.25-.5 TABLET, ORALLY DISINTEGRATING ORAL
Qty: 45 TABLET | Refills: 0 | Status: CANCELLED | OUTPATIENT
Start: 2020-09-23

## 2020-09-23 RX ORDER — CLONAZEPAM 0.5 MG/1
.25-.5 TABLET ORAL
Qty: 45 TABLET | Refills: 0 | Status: SHIPPED | OUTPATIENT
Start: 2020-09-23 | End: 2020-11-17

## 2020-09-23 RX ORDER — MENTHOL/CAMPHOR 0.5 %-0.5%
1 LOTION (ML) TOPICAL EVERY 6 HOURS PRN
Qty: 59 ML | Refills: 1 | Status: SHIPPED | OUTPATIENT
Start: 2020-09-23 | End: 2023-07-11

## 2020-09-23 RX ORDER — KETOROLAC TROMETHAMINE 10 MG/1
10 TABLET, FILM COATED ORAL EVERY 6 HOURS PRN
Qty: 4 TABLET | Refills: 0 | Status: SHIPPED | OUTPATIENT
Start: 2020-09-23 | End: 2020-11-07

## 2020-09-23 RX ORDER — METHOCARBAMOL 500 MG/1
500 TABLET, FILM COATED ORAL 4 TIMES DAILY PRN
Qty: 30 TABLET | Refills: 0 | Status: SHIPPED | OUTPATIENT
Start: 2020-09-23 | End: 2021-07-07

## 2020-09-23 RX ORDER — FLUCONAZOLE 150 MG/1
150 TABLET ORAL ONCE
Qty: 1 TABLET | Refills: 0 | Status: SHIPPED | OUTPATIENT
Start: 2020-09-23 | End: 2020-09-23

## 2020-09-23 RX ORDER — NORTRIPTYLINE HCL 10 MG
10 CAPSULE ORAL AT BEDTIME
Qty: 30 CAPSULE | Refills: 1 | Status: SHIPPED | OUTPATIENT
Start: 2020-09-23 | End: 2021-07-07

## 2020-09-23 ASSESSMENT — ANXIETY QUESTIONNAIRES
GAD7 TOTAL SCORE: 12
3. WORRYING TOO MUCH ABOUT DIFFERENT THINGS: NEARLY EVERY DAY
6. BECOMING EASILY ANNOYED OR IRRITABLE: SEVERAL DAYS
2. NOT BEING ABLE TO STOP OR CONTROL WORRYING: NEARLY EVERY DAY
1. FEELING NERVOUS, ANXIOUS, OR ON EDGE: NEARLY EVERY DAY
7. FEELING AFRAID AS IF SOMETHING AWFUL MIGHT HAPPEN: NOT AT ALL
IF YOU CHECKED OFF ANY PROBLEMS ON THIS QUESTIONNAIRE, HOW DIFFICULT HAVE THESE PROBLEMS MADE IT FOR YOU TO DO YOUR WORK, TAKE CARE OF THINGS AT HOME, OR GET ALONG WITH OTHER PEOPLE: VERY DIFFICULT
5. BEING SO RESTLESS THAT IT IS HARD TO SIT STILL: NOT AT ALL

## 2020-09-23 ASSESSMENT — PATIENT HEALTH QUESTIONNAIRE - PHQ9
5. POOR APPETITE OR OVEREATING: MORE THAN HALF THE DAYS
SUM OF ALL RESPONSES TO PHQ QUESTIONS 1-9: 10

## 2020-09-23 ASSESSMENT — MIFFLIN-ST. JEOR: SCORE: 1511.15
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
## 2020-09-23 NOTE — PATIENT INSTRUCTIONS
Patient Education     Discharge Instructions for Diverticulitis  You have been diagnosed with diverticulitis. This is a condition in which small pouches form in your colon (large intestine) and become inflamed or infected. Follow the guidelines below for home care.  As you recover  Tips for recovery include:    Eat a low-fiber diet. Your healthcare provider may advise a liquid diet. This gives your bowel a chance to rest so that it can recover.    Foods to include: flake cereal, mashed potatoes, pancakes, waffles, pasta, white bread, rice, applesauce, bananas, eggs, fish, poultry, tofu, and well-cooked vegetables    Take your medicines as directed. Do not stop taking the medicines, even if you feel better.    Monitor your temperature and report any rising temperature to your healthcare provider.    Take antibiotics exactly as directed. Do not miss any and keep taking them even if you feel better.     Drink 6 to 8 glasses of water every day, unless directed otherwise.    Use a heating pad or hot water bottle to reduce abdominal cramping or pain.  Preventing diverticulitis in the future  Tips for prevention include:    Eat a high-fiber diet. Fiber adds bulk to the stool so that it passes through the large intestine more easily.    Keep drinking 6 to 8 glasses of water every day, unless directed otherwise.    Begin an exercise program. Ask your healthcare provider how to get started. You can benefit from simple activities such as walking or gardening.    Treat diarrhea with a bland diet. Start with liquids only, then slowly add fiber over time.    Watch for changes in your bowel movements (constipation to diarrhea).    Avoid constipation with fiber and add a stool softener if needed.     Get plenty of rest and sleep.  Follow-up care  Make a follow-up appointment as directed by our staff.  When to call your healthcare provider  Call your healthcare provider immediately if you have any of the following:    Fever of  100.4 F (38.0 C) or higher, or as directed by your healthcare provider    Chills    Severe cramps in the belly, most commonly the lower left side    Tenderness in the belly, most commonly the lower left side    Nausea and vomiting    Bleeding from your rectum   Date Last Reviewed: 7/1/2016 2000-2019 [employer]. [address] [place] [id number]. All rights reserved. This information is not intended as a substitute for professional medical care. Always follow your healthcare professional's instructions.         Patient Education     Understanding Diverticulosis and Diverticulitis     Pouches or diverticula usually occur in the lower part of the colon called the sigmoid.   The colon (large intestine) is the last part of the digestive tract. It absorbs water from stool and changes it from a liquid to a solid. In certain cases, small pouches called diverticula can form in the colon wall. This condition is called diverticulosis. It's very common as people get older. The pouches can become infected. If this happens, it becomes a more serious problem called diverticulitis. These problems can be painful. But they can be managed.  Managing your condition  Diet changes or medicines may be prescribed.   If you have diverticulosis  Recommendations include:    Diet changes are often enough to control symptoms. The main changes are adding fiber (roughage) and drinking more water. Fiber absorbs water as it travels through your colon. This helps your stool stay soft and move smoothly. Water helps this process.    If needed, you may be told to take over-the-counter stool softeners.    To help ease pain, antispasmodic medicines may be prescribed.    Watch for changes in your bowel movements. Tell your healthcare provider if you notice any changes.    Begin an exercise program. Ask your healthcare provider how to get started.    Get plenty of rest and sleep.   If you have diverticulitis  Treatment depends on  how bad your symptoms are.    For mild symptoms. You may be put on a liquid diet for a short time. Antibiotics are usually prescribed. If these 2 steps relieve your symptoms, you may then be prescribed a high-fiber diet. If you still have symptoms, your healthcare provider will discuss more treatment choices with you.    For severe symptoms. You may need to be admitted to the hospital. There, you can be given IV antibiotics and fluids. You will also be put on a low-fiber or liquid diet. Although not common, surgery is needed in some people with severe symptoms.  [place] to colon health     Diverticulitis occurs when the pouches become infected or inflamed.     Help keep your colon healthy with a diet that includes plenty of high-fiber fruits, vegetables, and whole grains. Drink plenty of liquids like water and juice. Maintain a healthy lifestyle, including regular exercise, stress management, and adequate rest and sleep.   Date Last Reviewed: 7/1/2016 2000-2019 [employer]. [address] [place] [address], [place] [address]. All rights reserved. This information is not intended as a substitute for professional medical care. Always follow your healthcare professional's instructions.

## 2020-09-24 ASSESSMENT — ANXIETY QUESTIONNAIRES: GAD7 TOTAL SCORE: 12

## 2020-09-24 ASSESSMENT — ASTHMA QUESTIONNAIRES: ACT_TOTALSCORE: 13

## 2020-09-30 PROBLEM — F32.0 MILD MAJOR DEPRESSION (H): Status: ACTIVE | Noted: 2020-09-30

## 2020-10-09 DIAGNOSIS — Z11.59 ENCOUNTER FOR SCREENING FOR OTHER VIRAL DISEASES: ICD-10-CM

## 2020-10-09 PROCEDURE — U0003 INFECTIOUS AGENT DETECTION BY NUCLEIC ACID (DNA OR RNA); SEVERE ACUTE RESPIRATORY SYNDROME CORONAVIRUS 2 (SARS-COV-2) (CORONAVIRUS DISEASE [COVID-19]), AMPLIFIED PROBE TECHNIQUE, MAKING USE OF HIGH THROUGHPUT TECHNOLOGIES AS DESCRIBED BY CMS-2020-01-R: HCPCS | Performed by: OBSTETRICS & GYNECOLOGY

## 2020-10-10 LAB
SARS-COV-2 RNA SPEC QL NAA+PROBE: NOT DETECTED
SPECIMEN SOURCE: NORMAL

## 2020-10-11 ENCOUNTER — ANCILLARY PROCEDURE (OUTPATIENT)
Dept: MRI IMAGING | Facility: CLINIC | Age: 50
End: 2020-10-11
Attending: OBSTETRICS & GYNECOLOGY
Payer: COMMERCIAL

## 2020-10-11 DIAGNOSIS — N75.0 BARTHOLIN'S CYST: ICD-10-CM

## 2020-10-11 LAB — RADIOLOGIST FLAGS: ABNORMAL

## 2020-10-11 PROCEDURE — 72197 MRI PELVIS W/O & W/DYE: CPT | Performed by: RADIOLOGY

## 2020-10-11 PROCEDURE — A9585 GADOBUTROL INJECTION: HCPCS | Performed by: RADIOLOGY

## 2020-10-11 RX ORDER — GADOBUTROL 604.72 MG/ML
10 INJECTION INTRAVENOUS ONCE
Status: COMPLETED | OUTPATIENT
Start: 2020-10-11 | End: 2020-10-11

## 2020-10-11 RX ADMIN — GADOBUTROL 9.5 ML: 604.72 INJECTION INTRAVENOUS at 08:57

## 2020-10-11 NOTE — DISCHARGE INSTRUCTIONS
MRI Contrast Discharge Instructions    The IV contrast you received today will pass out of your body in your  urine. This will happen in the next 24 hours. You will not feel this process.  Your urine will not change color.    Drink at least 4 extra glasses of water or juice today (unless your doctor  has restricted your fluids). This reduces the stress on your kidneys.  You may take your regular medicines.    If you are on dialysis: It is best to have dialysis today.    If you have a reaction: Most reactions happen right away. If you have  any new symptoms after leaving the hospital (such as hives or swelling),  call your hospital at the correct number below. Or call your family doctor.  If you have breathing distress or wheezing, call 911.    Special instructions: ***    I have read and understand the above information.    Signature:______________________________________ Date:___________    Staff:__________________________________________ Date:___________     Time:__________    [facility] Departments:    ___[place]: [phone]  ___[facility]: [phone]  ___[place]: [phone] ___[facility]: [phone]  ___[facility]: [phone]  ___[facility]: [phone]  ___[phone]  ___[facility]: [phone]  ___[phone]

## 2020-10-13 ENCOUNTER — ANESTHESIA (OUTPATIENT)
Dept: SURGERY | Facility: CLINIC | Age: 50
End: 2020-10-13
Payer: COMMERCIAL

## 2020-10-13 ENCOUNTER — ANESTHESIA EVENT (OUTPATIENT)
Dept: SURGERY | Facility: CLINIC | Age: 50
End: 2020-10-13
Payer: COMMERCIAL

## 2020-10-13 ENCOUNTER — HOSPITAL ENCOUNTER (OUTPATIENT)
Facility: CLINIC | Age: 50
Discharge: HOME | End: 2020-10-13
Attending: OBSTETRICS & GYNECOLOGY | Admitting: OBSTETRICS & GYNECOLOGY
Payer: COMMERCIAL

## 2020-10-13 VITALS
BODY MASS INDEX: 39.27 KG/M2 | WEIGHT: 208 LBS | SYSTOLIC BLOOD PRESSURE: 132 MMHG | DIASTOLIC BLOOD PRESSURE: 76 MMHG | RESPIRATION RATE: 14 BRPM | OXYGEN SATURATION: 100 % | TEMPERATURE: 98 F | HEIGHT: 61 IN | HEART RATE: 65 BPM

## 2020-10-13 DIAGNOSIS — N75.0 BARTHOLIN'S CYST: Primary | ICD-10-CM

## 2020-10-13 DIAGNOSIS — B37.31 YEAST VAGINITIS: ICD-10-CM

## 2020-10-13 DIAGNOSIS — G89.18 POSTOPERATIVE PAIN: ICD-10-CM

## 2020-10-13 PROCEDURE — 370N000002 HC ANESTHESIA TECHNICAL FEE, EACH ADDTL 15 MIN: Performed by: OBSTETRICS & GYNECOLOGY

## 2020-10-13 PROCEDURE — 761N000007 HC RECOVERY PHASE 2 EACH 15 MINS: Performed by: OBSTETRICS & GYNECOLOGY

## 2020-10-13 PROCEDURE — 250N000012 HC RX MED GY IP 250 OP 636 PS 637: Performed by: ANESTHESIOLOGY

## 2020-10-13 PROCEDURE — 360N000014 HC SURGERY LEVEL 2 1ST 30 MIN: Performed by: OBSTETRICS & GYNECOLOGY

## 2020-10-13 PROCEDURE — 258N000003 HC RX IP 258 OP 636: Performed by: NURSE ANESTHETIST, CERTIFIED REGISTERED

## 2020-10-13 PROCEDURE — 250N000011 HC RX IP 250 OP 636: Performed by: ANESTHESIOLOGY

## 2020-10-13 PROCEDURE — 93010 ELECTROCARDIOGRAM REPORT: CPT | Performed by: INTERNAL MEDICINE

## 2020-10-13 PROCEDURE — 56440 MRSPLZATN BRTHLNS GLND CST: CPT | Mod: LT | Performed by: OBSTETRICS & GYNECOLOGY

## 2020-10-13 PROCEDURE — 370N000001 HC ANESTHESIA TECHNICAL FEE, 1ST 30 MIN: Performed by: OBSTETRICS & GYNECOLOGY

## 2020-10-13 PROCEDURE — 999N000135 HC STATISTIC PRE PROC ASSESS I: Performed by: OBSTETRICS & GYNECOLOGY

## 2020-10-13 PROCEDURE — 761N000001 HC RECOVERY PHASE 1 LEVEL 1 FIRST HR: Performed by: OBSTETRICS & GYNECOLOGY

## 2020-10-13 PROCEDURE — 272N000001 HC OR GENERAL SUPPLY STERILE: Performed by: OBSTETRICS & GYNECOLOGY

## 2020-10-13 PROCEDURE — 250N000009 HC RX 250: Performed by: ANESTHESIOLOGY

## 2020-10-13 PROCEDURE — 250N000011 HC RX IP 250 OP 636: Performed by: OBSTETRICS & GYNECOLOGY

## 2020-10-13 PROCEDURE — 250N000009 HC RX 250: Performed by: NURSE ANESTHETIST, CERTIFIED REGISTERED

## 2020-10-13 PROCEDURE — 360N000015 HC SURGERY LEVEL 2 EA 15 ADDTL MIN: Performed by: OBSTETRICS & GYNECOLOGY

## 2020-10-13 PROCEDURE — 250N000011 HC RX IP 250 OP 636: Performed by: NURSE ANESTHETIST, CERTIFIED REGISTERED

## 2020-10-13 PROCEDURE — 258N000003 HC RX IP 258 OP 636: Performed by: ANESTHESIOLOGY

## 2020-10-13 RX ORDER — DEXAMETHASONE SODIUM PHOSPHATE 4 MG/ML
INJECTION, SOLUTION INTRA-ARTICULAR; INTRALESIONAL; INTRAMUSCULAR; INTRAVENOUS; SOFT TISSUE PRN
Status: DISCONTINUED | OUTPATIENT
Start: 2020-10-13 | End: 2020-10-13

## 2020-10-13 RX ORDER — GLYCOPYRROLATE 0.2 MG/ML
INJECTION, SOLUTION INTRAMUSCULAR; INTRAVENOUS PRN
Status: DISCONTINUED | OUTPATIENT
Start: 2020-10-13 | End: 2020-10-13

## 2020-10-13 RX ORDER — KETOROLAC TROMETHAMINE 30 MG/ML
30 INJECTION, SOLUTION INTRAMUSCULAR; INTRAVENOUS EVERY 6 HOURS PRN
Status: DISCONTINUED | OUTPATIENT
Start: 2020-10-13 | End: 2020-10-13 | Stop reason: HOSPADM

## 2020-10-13 RX ORDER — FENTANYL CITRATE 50 UG/ML
INJECTION, SOLUTION INTRAMUSCULAR; INTRAVENOUS PRN
Status: DISCONTINUED | OUTPATIENT
Start: 2020-10-13 | End: 2020-10-13

## 2020-10-13 RX ORDER — PROPOFOL 10 MG/ML
INJECTION, EMULSION INTRAVENOUS CONTINUOUS PRN
Status: DISCONTINUED | OUTPATIENT
Start: 2020-10-13 | End: 2020-10-13

## 2020-10-13 RX ORDER — FLUCONAZOLE 150 MG/1
TABLET ORAL
Qty: 2 TABLET | Refills: 0 | Status: SHIPPED | OUTPATIENT
Start: 2020-10-13 | End: 2020-11-17

## 2020-10-13 RX ORDER — HYDROMORPHONE HYDROCHLORIDE 1 MG/ML
.3-.5 INJECTION, SOLUTION INTRAMUSCULAR; INTRAVENOUS; SUBCUTANEOUS EVERY 10 MIN PRN
Status: DISCONTINUED | OUTPATIENT
Start: 2020-10-13 | End: 2020-10-13 | Stop reason: HOSPADM

## 2020-10-13 RX ORDER — PREDNISONE 10 MG/1
10 TABLET ORAL ONCE
Status: COMPLETED | OUTPATIENT
Start: 2020-10-13 | End: 2020-10-13

## 2020-10-13 RX ORDER — NEOSTIGMINE METHYLSULFATE 1 MG/ML
VIAL (ML) INJECTION PRN
Status: DISCONTINUED | OUTPATIENT
Start: 2020-10-13 | End: 2020-10-13

## 2020-10-13 RX ORDER — CEFAZOLIN SODIUM 2 G/100ML
2 INJECTION, SOLUTION INTRAVENOUS
Status: COMPLETED | OUTPATIENT
Start: 2020-10-13 | End: 2020-10-13

## 2020-10-13 RX ORDER — METOPROLOL TARTRATE 1 MG/ML
1-2 INJECTION, SOLUTION INTRAVENOUS EVERY 5 MIN PRN
Status: DISCONTINUED | OUTPATIENT
Start: 2020-10-13 | End: 2020-10-13 | Stop reason: HOSPADM

## 2020-10-13 RX ORDER — CEFAZOLIN SODIUM 1 G/3ML
1 INJECTION, POWDER, FOR SOLUTION INTRAMUSCULAR; INTRAVENOUS SEE ADMIN INSTRUCTIONS
Status: DISCONTINUED | OUTPATIENT
Start: 2020-10-13 | End: 2020-10-13 | Stop reason: HOSPADM

## 2020-10-13 RX ORDER — SODIUM CHLORIDE, SODIUM LACTATE, POTASSIUM CHLORIDE, CALCIUM CHLORIDE 600; 310; 30; 20 MG/100ML; MG/100ML; MG/100ML; MG/100ML
INJECTION, SOLUTION INTRAVENOUS CONTINUOUS
Status: DISCONTINUED | OUTPATIENT
Start: 2020-10-13 | End: 2020-10-13 | Stop reason: HOSPADM

## 2020-10-13 RX ORDER — FENTANYL CITRATE 50 UG/ML
25-50 INJECTION, SOLUTION INTRAMUSCULAR; INTRAVENOUS
Status: DISCONTINUED | OUTPATIENT
Start: 2020-10-13 | End: 2020-10-13 | Stop reason: HOSPADM

## 2020-10-13 RX ORDER — ACETAMINOPHEN 325 MG/1
975 TABLET ORAL ONCE
Status: DISCONTINUED | OUTPATIENT
Start: 2020-10-13 | End: 2020-10-13 | Stop reason: HOSPADM

## 2020-10-13 RX ORDER — KETOROLAC TROMETHAMINE 30 MG/ML
30 INJECTION, SOLUTION INTRAMUSCULAR; INTRAVENOUS
Status: DISCONTINUED | OUTPATIENT
Start: 2020-10-13 | End: 2020-10-13

## 2020-10-13 RX ORDER — ALBUTEROL SULFATE 0.83 MG/ML
2.5 SOLUTION RESPIRATORY (INHALATION) EVERY 4 HOURS PRN
Status: DISCONTINUED | OUTPATIENT
Start: 2020-10-13 | End: 2020-10-13 | Stop reason: HOSPADM

## 2020-10-13 RX ORDER — MEPERIDINE HYDROCHLORIDE 25 MG/ML
12.5 INJECTION INTRAMUSCULAR; INTRAVENOUS; SUBCUTANEOUS
Status: DISCONTINUED | OUTPATIENT
Start: 2020-10-13 | End: 2020-10-13 | Stop reason: HOSPADM

## 2020-10-13 RX ORDER — ONDANSETRON 4 MG/1
4 TABLET, ORALLY DISINTEGRATING ORAL EVERY 30 MIN PRN
Status: CANCELLED | OUTPATIENT
Start: 2020-10-13

## 2020-10-13 RX ORDER — ONDANSETRON 2 MG/ML
4 INJECTION INTRAMUSCULAR; INTRAVENOUS EVERY 30 MIN PRN
Status: CANCELLED | OUTPATIENT
Start: 2020-10-13

## 2020-10-13 RX ORDER — IBUPROFEN 600 MG/1
600 TABLET, FILM COATED ORAL EVERY 6 HOURS PRN
Qty: 30 TABLET | Refills: 0 | Status: SHIPPED | OUTPATIENT
Start: 2020-10-13

## 2020-10-13 RX ORDER — PREDNISONE 10 MG/1
10 TABLET ORAL DAILY
COMMUNITY
End: 2022-04-29

## 2020-10-13 RX ORDER — PROPOFOL 10 MG/ML
INJECTION, EMULSION INTRAVENOUS PRN
Status: DISCONTINUED | OUTPATIENT
Start: 2020-10-13 | End: 2020-10-13

## 2020-10-13 RX ORDER — HYDRALAZINE HYDROCHLORIDE 20 MG/ML
2.5-5 INJECTION INTRAMUSCULAR; INTRAVENOUS EVERY 10 MIN PRN
Status: DISCONTINUED | OUTPATIENT
Start: 2020-10-13 | End: 2020-10-13 | Stop reason: HOSPADM

## 2020-10-13 RX ORDER — LIDOCAINE HYDROCHLORIDE 10 MG/ML
INJECTION, SOLUTION INFILTRATION; PERINEURAL PRN
Status: DISCONTINUED | OUTPATIENT
Start: 2020-10-13 | End: 2020-10-13

## 2020-10-13 RX ORDER — LIDOCAINE 40 MG/G
CREAM TOPICAL
Status: DISCONTINUED | OUTPATIENT
Start: 2020-10-13 | End: 2020-10-13 | Stop reason: HOSPADM

## 2020-10-13 RX ORDER — NALOXONE HYDROCHLORIDE 0.4 MG/ML
.1-.4 INJECTION, SOLUTION INTRAMUSCULAR; INTRAVENOUS; SUBCUTANEOUS
Status: DISCONTINUED | OUTPATIENT
Start: 2020-10-13 | End: 2020-10-13 | Stop reason: HOSPADM

## 2020-10-13 RX ORDER — FENTANYL CITRATE 50 UG/ML
25-50 INJECTION, SOLUTION INTRAMUSCULAR; INTRAVENOUS EVERY 5 MIN PRN
Status: DISCONTINUED | OUTPATIENT
Start: 2020-10-13 | End: 2020-10-13 | Stop reason: HOSPADM

## 2020-10-13 RX ADMIN — ALBUTEROL SULFATE 2.5 MG: 2.5 SOLUTION RESPIRATORY (INHALATION) at 08:41

## 2020-10-13 RX ADMIN — CEFAZOLIN SODIUM 2 G: 2 INJECTION, SOLUTION INTRAVENOUS at 07:40

## 2020-10-13 RX ADMIN — MIDAZOLAM 1 MG: 1 INJECTION INTRAMUSCULAR; INTRAVENOUS at 07:43

## 2020-10-13 RX ADMIN — ROCURONIUM BROMIDE 10 MG: 10 INJECTION INTRAVENOUS at 08:16

## 2020-10-13 RX ADMIN — GLYCOPYRROLATE 0.6 MG: 0.2 INJECTION, SOLUTION INTRAMUSCULAR; INTRAVENOUS at 08:23

## 2020-10-13 RX ADMIN — PHENYLEPHRINE HYDROCHLORIDE 50 MCG: 10 INJECTION INTRAVENOUS at 08:12

## 2020-10-13 RX ADMIN — LIDOCAINE HYDROCHLORIDE 50 MG: 10 INJECTION, SOLUTION INFILTRATION; PERINEURAL at 07:51

## 2020-10-13 RX ADMIN — FENTANYL CITRATE 50 MCG: 50 INJECTION, SOLUTION INTRAMUSCULAR; INTRAVENOUS at 08:59

## 2020-10-13 RX ADMIN — Medication 4 MG: at 08:23

## 2020-10-13 RX ADMIN — FENTANYL CITRATE 100 MCG: 50 INJECTION, SOLUTION INTRAMUSCULAR; INTRAVENOUS at 07:51

## 2020-10-13 RX ADMIN — PROPOFOL 150 MG: 10 INJECTION, EMULSION INTRAVENOUS at 07:51

## 2020-10-13 RX ADMIN — PREDNISONE 10 MG: 10 TABLET ORAL at 09:20

## 2020-10-13 RX ADMIN — ROCURONIUM BROMIDE 30 MG: 10 INJECTION INTRAVENOUS at 07:51

## 2020-10-13 RX ADMIN — PROPOFOL 20 MCG/KG/MIN: 10 INJECTION, EMULSION INTRAVENOUS at 08:06

## 2020-10-13 RX ADMIN — SODIUM CHLORIDE, POTASSIUM CHLORIDE, SODIUM LACTATE AND CALCIUM CHLORIDE: 600; 310; 30; 20 INJECTION, SOLUTION INTRAVENOUS at 07:40

## 2020-10-13 RX ADMIN — MIDAZOLAM 1 MG: 1 INJECTION INTRAMUSCULAR; INTRAVENOUS at 07:40

## 2020-10-13 RX ADMIN — DEXAMETHASONE SODIUM PHOSPHATE 4 MG: 4 INJECTION, SOLUTION INTRA-ARTICULAR; INTRALESIONAL; INTRAMUSCULAR; INTRAVENOUS; SOFT TISSUE at 07:51

## 2020-10-13 ASSESSMENT — MIFFLIN-ST. JEOR: SCORE: 1510.86

## 2020-10-13 ASSESSMENT — LIFESTYLE VARIABLES: TOBACCO_USE: 1
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
## 2020-10-13 NOTE — PROGRESS NOTES
SPIRITUAL HEALTH SERVICES  [facility]  PRE-SURGERY VISIT    Pre-surgical visit with pt.  Provided spiritual support, prayer.   Oriented to Spiritual Health Services and availability for emotional/spiritual support during hospital stay.         Dean Kent MA  Staff   Pager: [phone]  Phone: [phone]

## 2020-10-13 NOTE — OP NOTE
Procedure Date: 10/13/2020      PREOPERATIVE DIAGNOSIS:  Left Bartholin's cyst.      POSTOPERATIVE DIAGNOSIS:  Left Bartholin's cyst.      PROCEDURE:  Marsupialization of left Bartholin's cyst.      SURGEON:  Chris Freeman MD      ANESTHESIA:  General endotracheal anesthesia.      FLUIDS:  700 mL lactated Ringer's and Ancef 2 grams IV preop.      ESTIMATED BLOOD LOSS:  5 mL      DRAINS:  None.      INDICATIONS FOR PROCEDURE:  The patient is a 49-year-old woman who has a persistent left Bartholin's cyst.  This was confirmed on physical exam as well as on CT scan and MRI that were done for other indications of where the pelvis was imaged.  The patient would like to have this treated via marsupialization.  Excision was also entertained, although this would only be done if there was any evidence of nodularity or solid masses around the lesion because all indications by imaging studies suggest this is just a benign Bartholin's cyst.  The patient understands the purpose of marsupialization is to create a drainage tract to allow future secretions to efflux without any obstruction.  She knows that there is a possibility that this could recur.  She understands the indication for the procedure as well as potential risks such as bleeding, infection, injury to surrounding tissues and organs as well as recurrence of the Bartholin's cyst in the future.  She accepts these risks as well as the risks of anesthesia including aspiration, malignant hyperthermia and death.  She accepts all these risks and has given informed consent.      FINDINGS:  On pelvic exam under anesthesia, there is a 1 cm left Bartholin's cyst present.      Intraoperative findings included a 1 cm left Bartholin's cyst containing clear mucoid fluid with no surrounding nodularity or solid masses.      SPECIMENS:  None.      COMPLICATIONS:  None.      PROCEDURE IN DETAIL:  After proper patient identification and consent for procedure was obtained, the patient  was taken to the operating room where adequate general endotracheal anesthesia was obtained.  The patient was placed in dorsal lithotomy position with legs in candy-cane stirrups and pelvic exam under anesthesia revealed the above findings.  She was prepped and draped in the usual sterile manner for this procedure.  An elliptical incision was made in the skin of the left labia on the medial aspect of the labia minor to overlie the area where the Bartholin's cyst was most superficial.  The overlying skin was excised with iris scissors.  The cyst wall was identified and incised with the scissors and a window was created in the cyst wall excising a portion of it.  Marsupialization was performed using a 4-0 Vicryl suture, reapproximating the adjacent skin to the adjacent cyst wall incision site circumferentially creating the marsupialization.  Hemostasis was confirmed at the end.      The patient tolerated the procedure well.  Sponge, instrument and needle counts were correct x 2.  The patient was taken to recovery room, extubated in stable condition.         SHANTELL SANCHEZ MD             D: 10/13/2020   T: 10/13/2020   MT: DEVIN      Name:     CAITIE ESCAMILLA   MRN:      [id number]        Account:        [id number]   :      1970           Procedure Date: 10/13/2020      Document: [id number]

## 2020-10-13 NOTE — ANESTHESIA POSTPROCEDURE EVALUATION
Patient: Jessie Seymour    Procedure(s):  MARSUPIALIZATION OF LEFT BARTHOLIN'S CYST    Diagnosis:Bartholin's cyst [N75.0]  Diagnosis Additional Information: No value filed.    Anesthesia Type:  General    Note:  Anesthesia Post Evaluation    Patient location during evaluation: PACU  Patient participation: Able to fully participate in evaluation  Level of consciousness: awake  Pain management: adequate  Airway patency: patent  Cardiovascular status: acceptable  Respiratory status: acceptable  Hydration status: acceptable  PONV: controlled     Anesthetic complications: None          Last vitals:  Vitals:    10/13/20 0840 10/13/20 0845 10/13/20 0900   BP: 138/77 124/55 120/59   Pulse: 89 95 80   Resp: 18 16 14   Temp:      SpO2: 99% 100% 96%         Electronically Signed By: Emile Harding MD  October 13, 2020  9:39 AM

## 2020-10-13 NOTE — ANESTHESIA CARE TRANSFER NOTE
Patient: Jessie Seymour    Procedure(s):  MARSUPIALIZATION OF LEFT BARTHOLIN'S CYST    Diagnosis: Bartholin's cyst [N75.0]  Diagnosis Additional Information: No value filed.    Anesthesia Type:   General     Note:  Airway :Face Mask  Patient transferred to:PACU  Comments: Report and signed off to RN in PACU.  Good Resps, skin pink, VSS, O2 via face tent.      Vitals: (Last set prior to Anesthesia Care Transfer)    CRNA VITALS  10/13/2020 0759 - 10/13/2020 0834      10/13/2020             Pulse:  118    SpO2:  91 %                Electronically Signed By: LEE Lowe CRNA  October 13, 2020  8:34 AM

## 2020-10-13 NOTE — DISCHARGE INSTRUCTIONS
DR. SHANTELL SANCHEZ M.D.     CLINIC PHONE NUMBER [phone].  [facility]        LAPAROSCOPY, HYSTEROSCOPY OR PELVISCOPY DISCHARGE INSTRUCTIONS        PAIN  YOU MAY HAVE CRAMPS, SHOULDER PAIN OR A LOW BACKACHE FOR 24 TO 48 HOURS.  TYLENOL (ACETAMINOPHEN) OR MOTRIN (IBUPROFEN) MAY HELP, OR YOUR DOCTOR MAY GIVE YOU PAIN MEDICINE.  CALL YOUR DOCTOR IF PAIN CANNOT BE CONTROLLED.    BLEEDING OR VAGINAL DISCHARGE  YOU MAY HAVE SOME BLEEDING OR DISCHARGE FOR UP TO A WEEK OR LONGER.  DO NOT DOUCHE, USE TAMPONS OR HAVE SEX (INTERCOURSE) FOR ______DAYS.  CALL YOUR DOCTOR IF YOU SOAK MORE THAN ONE MAXI PAD (SANITARY [place]) PER HOUR, OR IF YOU PASS LARGE BLOOD CLOTS.    FEVER  YOU MAY HAVE A LOW FEVER FOR THE FIRST TWO DAYS.  CALL YOUR DOCTOR IF IT GOES OVER 101 DEGREES.    NAUSEA  IF YOU HAVE NAUSEA (FEEL SICK TO YOUR STOMACH), STAY IN BED.  TRY DRINKING A SMALL AMOUNT OF 7-UP, TEA OR SOUP.    SWOLLEN BELLY  IF YOUR ABDOMEN (BELLY AREA) FEELS FIRM OR SWOLLEN, CALL YOUR DOCTOR.    DIZZINESS AND WEAKNESS  YOU MAY FEEL DIZZY OR WEAK FOR A FEW DAYS.  IF SO, YOU SHOULD REST OFTEN, STAND UP SLOWLY AND USE CARE WHEN CLIMBING STAIRS.    DIET AND ACTIVITY  EAT LIGHT MEALS AND DRINK PLENTY OF FLUIDS FOR THE FIRST 24 HOURS (OR LONGER, IF YOU HAVE NAUSEA).  WAIT 5 DAYS BEFORE BATHING.  SHOWERS ARE OKAY.  MOST WOMEN CAN RETURN TO WORK AFTER 24 HOURS.  YOU MAY GO BACK TO YOUR OTHER ACTIVITIES AFTER YOUR PAIN GOES AWAY.            IF YOU HAVE STITCHES  YOU MAY REMOVE YOUR BANDAGE THE DAY AFTER TREATMENT.  YOUR DOCTOR WILL TELL YOU IF YOUR STITCHES NEED TO BE REMOVED.  SOME STITCHES DISSOLVE OVER TIME.               GENERAL ANESTHESIA OR SEDATION ADULT DISCHARGE INSTRUCTIONS   SPECIAL PRECAUTIONS FOR 24 HOURS AFTER SURGERY    IT IS NOT UNUSUAL TO FEEL LIGHT-HEADED OR FAINT, UP TO 24 HOURS AFTER SURGERY OR WHILE TAKING PAIN MEDICATION.  IF YOU HAVE THESE SYMPTOMS; SIT FOR A FEW MINUTES BEFORE STANDING AND HAVE SOMEONE ASSIST YOU WHEN YOU  GET UP TO WALK OR USE THE BATHROOM.    YOU SHOULD REST AND RELAX FOR THE NEXT 24 HOURS AND YOU MUST MAKE ARRANGEMENTS TO HAVE SOMEONE STAY WITH YOU FOR AT LEAST 24 HOURS AFTER YOUR DISCHARGE.  AVOID HAZARDOUS AND STRENUOUS ACTIVITIES.  DO NOT MAKE IMPORTANT DECISIONS FOR 24 HOURS.    DO NOT DRIVE ANY VEHICLE OR OPERATE MECHANICAL EQUIPMENT FOR 24 HOURS FOLLOWING THE END OF YOUR SURGERY.  EVEN THOUGH YOU MAY FEEL NORMAL, YOUR REACTIONS MAY BE AFFECTED BY THE MEDICATION YOU HAVE RECEIVED.    DO NOT DRINK ALCOHOLIC BEVERAGES FOR 24 HOURS FOLLOWING YOUR SURGERY.    DRINK CLEAR LIQUIDS (APPLE JUICE, GINGER ALE, 7-UP, BROTH, ETC.).  PROGRESS TO YOUR REGULAR DIET AS YOU FEEL ABLE.    YOU MAY HAVE A DRY MOUTH, A SORE THROAT, MUSCLES ACHES OR TROUBLE SLEEPING.  THESE SHOULD GO AWAY AFTER 24 HOURS.    CALL YOUR DOCTOR FOR ANY OF THE FOLLOWING:  SIGNS OF INFECTION (FEVER, GROWING TENDERNESS AT THE SURGERY SITE, A LARGE AMOUNT OF DRAINAGE OR BLEEDING, SEVERE PAIN, FOUL-SMELLING DRAINAGE, REDNESS OR SWELLING.    IT HAS BEEN OVER 8 TO 10 HOURS SINCE SURGERY AND YOU ARE STILL NOT ABLE TO URINATE (PASS WATER).

## 2020-10-13 NOTE — BRIEF OP NOTE
[facility]    Brief Operative Note    Pre-operative diagnosis: Left Bartholin's cyst [N75.0]  Post-operative diagnosis Left Bartholin's Cyst    Procedure: Procedure(s):  MARSUPIALIZATION OF LEFT BARTHOLIN'S CYST  Surgeon: Surgeon(s) and Role:     * Chris Freeman MD - Primary  Anesthesia: General   Estimated blood loss: 5 cc  Drains: None  Specimens: None  Findings:   1 cm left Bartholin's Cyst containing clear mucoid fluid, no nodularity or solid masses.  Complications: None.    Chris Freeman MD

## 2020-10-14 ENCOUNTER — NURSE TRIAGE (OUTPATIENT)
Dept: NURSING | Facility: CLINIC | Age: 50
End: 2020-10-14

## 2020-10-14 LAB — INTERPRETATION ECG - MUSE: NORMAL

## 2020-10-14 NOTE — TELEPHONE ENCOUNTER
Surgery for  Batrtholins cyst yesterday,  She is calling today to say she has asthma, and she is sob today, and her pco2 is between 90-91.  Patient asking where she can go, and she states she lives in [place], [place]. She was advised to go to [facility].. and given directions.    Patient is going there now ., she reports.    Kat Saenz RN on 10/14/2020 at 10:47 AM    Additional Information    Negative: Severe difficulty breathing (e.g., struggling for each breath, speak in single words, pulse > 120)    Negative: Bluish (or gray) lips or face now    Negative: Difficult to awaken or acting confused (e.g., disoriented, slurred speech)    Negative: Passed out (i.e., lost consciousness, collapsed and was not responding)    Negative: Wheezing started suddenly after medicine, an allergic food, or bee sting    Negative: Sounds like a life-threatening emergency to the triager    Negative: [1] SEVERE asthma attack (e.g., very SOB at rest, speaks in single words, loud wheezes) AND [2] not resolved after 2 nebulizer or inhaler treatments    Negative: Peak flow rate less than 50% of baseline level (RED Zone)    Negative: [1] Severe wheezing or coughing AND [2] doesn't have neb or inhaler available    Negative: Chest pain    Negative: [1] Hospitalized before with asthma AND [2] feels the same now    Negative: [1] MODERATE asthma attack (e.g., SOB at rest, speaks in phrases, audible wheezes) AND [2] not resolved after 2 nebulizer or inhaler treatments given 20 minutes apart    Negative: [1] Peak flow rate 50-80% of baseline level (YELLOW zone) AND [2] not resolved after 2 nebulizer or inhaler treatments given 20 minutes apart    Negative: Asthma medicine (nebulizer or inhaler) is needed more frequently than q 4 hours to keep you comfortable    Negative: Fever > 103 F (39.4 C)    Negative: [1] Fever > 101 F (38.3 C) AND [2] age > 60    Negative: Patient sounds very sick or weak to the triager    Negative: [1] Continuous  (nonstop) coughing AND [2] keeps from working or sleeping AND [3] not improved after 2 nebulizer or inhaler treatments given 20 minutes apart    Negative: [1] Wheezing or coughing AND [2] hasn't used neb or inhaler twice AND [3] it's available    Negative: [1] Influenza prevalent in community (or household) AND [2] flu symptoms (e.g., cough WITH fever, etc) AND [3] onset < 48 hours ago    High risk adult asthmatic  (i.e., prior intubation for asthma, hospitalized this past year for asthma, frequent steroid bursts)    Negative: [1] Nasal discharge AND [2] present > 10 days    Negative: Sinus pain (around cheekbone or eye)    Negative: Fever present > 3 days (72 hours)    Negative: [1] MILD asthma attack (e.g., no SOB at rest, mild SOB with walking, speaks normally in sentences, mild wheezing) AND [2]  persists > 24 hours on appropriate treatment    Protocols used: ASTHMA ATTACK-A-AH

## 2020-10-16 ENCOUNTER — OFFICE VISIT (OUTPATIENT)
Dept: PEDIATRICS | Facility: CLINIC | Age: 50
End: 2020-10-16
Payer: COMMERCIAL

## 2020-10-16 VITALS
HEIGHT: 61 IN | BODY MASS INDEX: 41.59 KG/M2 | OXYGEN SATURATION: 96 % | DIASTOLIC BLOOD PRESSURE: 55 MMHG | HEART RATE: 58 BPM | SYSTOLIC BLOOD PRESSURE: 141 MMHG | TEMPERATURE: 97.2 F | WEIGHT: 220.3 LBS

## 2020-10-16 DIAGNOSIS — R06.83 SNORING: ICD-10-CM

## 2020-10-16 DIAGNOSIS — I25.10 CORONARY ARTERY DISEASE INVOLVING NATIVE HEART WITHOUT ANGINA PECTORIS, UNSPECIFIED VESSEL OR LESION TYPE: ICD-10-CM

## 2020-10-16 DIAGNOSIS — Z98.890 STATUS POST SURGERY: ICD-10-CM

## 2020-10-16 DIAGNOSIS — F51.01 PRIMARY INSOMNIA: ICD-10-CM

## 2020-10-16 DIAGNOSIS — F32.0 MILD MAJOR DEPRESSION (H): Primary | ICD-10-CM

## 2020-10-16 DIAGNOSIS — Z12.31 ENCOUNTER FOR SCREENING MAMMOGRAM FOR BREAST CANCER: ICD-10-CM

## 2020-10-16 DIAGNOSIS — R91.8 GROUND GLASS OPACITY PRESENT ON IMAGING OF LUNG: ICD-10-CM

## 2020-10-16 PROBLEM — E66.01 MORBID OBESITY (H): Status: ACTIVE | Noted: 2020-10-16

## 2020-10-16 PROCEDURE — 99214 OFFICE O/P EST MOD 30 MIN: CPT | Performed by: FAMILY MEDICINE

## 2020-10-16 RX ORDER — CEFDINIR 300 MG/1
300 CAPSULE ORAL 2 TIMES DAILY
COMMUNITY
End: 2020-11-17

## 2020-10-16 RX ORDER — CLONAZEPAM 0.5 MG/1
.25-.5 TABLET ORAL
Qty: 60 TABLET | Refills: 0 | Status: CANCELLED | OUTPATIENT
Start: 2020-10-16

## 2020-10-16 ASSESSMENT — PATIENT HEALTH QUESTIONNAIRE - PHQ9
SUM OF ALL RESPONSES TO PHQ QUESTIONS 1-9: 8
5. POOR APPETITE OR OVEREATING: SEVERAL DAYS

## 2020-10-16 ASSESSMENT — ANXIETY QUESTIONNAIRES
1. FEELING NERVOUS, ANXIOUS, OR ON EDGE: SEVERAL DAYS
7. FEELING AFRAID AS IF SOMETHING AWFUL MIGHT HAPPEN: SEVERAL DAYS
2. NOT BEING ABLE TO STOP OR CONTROL WORRYING: SEVERAL DAYS
5. BEING SO RESTLESS THAT IT IS HARD TO SIT STILL: SEVERAL DAYS
GAD7 TOTAL SCORE: 7
6. BECOMING EASILY ANNOYED OR IRRITABLE: SEVERAL DAYS
3. WORRYING TOO MUCH ABOUT DIFFERENT THINGS: SEVERAL DAYS

## 2020-10-16 ASSESSMENT — MIFFLIN-ST. JEOR: SCORE: 1566.65

## 2020-10-16 NOTE — PROGRESS NOTES
Subjective     Jessie Seymour is a 49 year old female who presents to clinic today for the following health issues:    HPI         Depression and Anxiety Follow-Up    How are you doing with your depression since your last visit? Worsened     How are you doing with your anxiety since your last visit?  Worsened     Are you having other symptoms that might be associated with depression or anxiety? No    Have you had a significant life event? No     Do you have any concerns with your use of alcohol or other drugs? No    Social History     Tobacco Use     Smoking status: Current Every Day Smoker     Packs/day: 0.50     Years: 15.00     Pack years: 7.50     Types: Cigarettes     Smokeless tobacco: Never Used     Tobacco comment: Pt. smokes appx. 6 cigarettes daily   Substance Use Topics     Alcohol use: Yes     Alcohol/week: 0.0 standard drinks     Comment: Rarely     Drug use: No     PHQ 3/18/2020 9/23/2020 10/16/2020   PHQ-9 Total Score 6 10 8   Q9: Thoughts of better off dead/self-harm past 2 weeks Not at all Not at all Not at all     BON-7 SCORE 3/18/2020 9/23/2020 10/16/2020   Total Score - - -   Total Score 13 (moderate anxiety) - -   Total Score 13 12 7       Suicide Assessment Five-step Evaluation and Treatment (SAFE-T)          Hospital Follow-up Visit:    Hospital/Nursing Home/ Rehab Facility: [facility]  Date of Admission: 10/14/2020  Date of Discharge: 10/16/2020  Reason(s) for Admission: Shortness of breath      Was your hospitalization related to COVID-19? No   Problems taking medications regularly:  None  Medication changes since discharge: None  Problems adhering to non-medication therapy:  None    Summary of hospitalization: Hospital discharge summary reviewed  Diagnostic Tests/Treatments reviewed.  Follow up needed: none  Other Healthcare Providers Involved in Patient s Care:         None  Update since discharge: improved.       Post Discharge Medication Reconciliation: discharge medications  "reconciled, continue medications without change.  Plan of care communicated with patient            Additional concerns, sleep disorder with snoring, day time somnolence, fit ful sleep  History of CAD but not evaluated but cardiologist or had an ECHO or stress test in a while, she is currently asymptomatic.    Review of Systems   Constitutional, HEENT, cardiovascular, pulmonary, GI, , musculoskeletal, neuro, skin, endocrine and psych systems are negative, except as otherwise noted.      Objective    BP (!) 141/55 (BP Location: Right arm, Patient Position: Sitting, Cuff Size: Adult Large)   Pulse 58   Temp 97.2  F (36.2  C) (Temporal)   Ht 1.549 m (5' 1\")   Wt 99.9 kg (220 lb 4.8 oz)   LMP 06/22/2016 (Exact Date)   SpO2 96%   BMI 41.63 kg/m    Body mass index is 41.63 kg/m .  Physical Exam   GENERAL: healthy, alert and no distress  EYES: Eyes grossly normal to inspection, PERRL and conjunctivae and sclerae normal  HENT: ear canals and TM's normal, nose and mouth without ulcers or lesions  NECK: no adenopathy, no asymmetry, masses, or scars and thyroid normal to palpation  RESP: lungs clear to auscultation - no rales, rhonchi or wheezes  CV: regular rate and rhythm, normal S1 S2, no S3 or S4, no murmur, click or rub, no peripheral edema and peripheral pulses strong  ABDOMEN: soft, nontender, no hepatosplenomegaly, no masses and bowel sounds normal  MS: no gross musculoskeletal defects noted, no edema  NEURO: Normal strength and tone, mentation intact and speech normal  PSYCH: mentation appears normal, affect normal/bright            Assessment & Plan     Mild major depression (H)  We discussed the treatment for anxiety and depression in detail.  The importance of a multi faceted approach in controlling symptoms was reviewed.  The benefits of cognitive behavioral therapy reviewed, benefits of exercise, and stress reduction also discussed.       Duration of treatment is at least 9 months with medication. It may " "take 3-4 weeks before symptom improvement happens.  Do not stop medication suddenly, medication will need to be tapered off.  Slight increased risk of suicide with SSRI group of medications discussed.       I ended our visit today by discussing the patient's diagnoses and recommended treatment. Please refer to today's diagnoses and orders for further details. I briefly discussed the pathophysiology of these conditions and outlined their expected course. I discussed the warning symptoms and signs that indicate an atypical course that would need urgent or emergent care. I also discussed self care strategies for symptom relief.  Patient voiced complete understanding of plan of care and was in full agreement to proceed. After visit summary discussed and handed to patient.    Common side effects of medications prescribed at this visit were discussed with the patient. Severe side effects, including current applicable black box warnings, were discussed.      - sertraline (ZOLOFT) 50 MG tablet; 25 mg daily for 1 week and then 50 mg daily.    Primary insomnia  Improvement with clonazepam, established with [facility] and sleep therapist.    Ground glass opacity present on imaging of lung  Needs repeat chest xray in 4-6 weeks  - CT Chest w/o Contrast; Future    Encounter for screening mammogram for breast cancer  - *MA Screening Digital Bilateral; Future    Coronary artery disease involving native heart without angina pectoris, unspecified vessel or lesion type  - Echocardiogram Exercise Stress; Future    Snoring  - SLEEP EVALUATION & MANAGEMENT REFERRAL - ADULT -[facility]  [phone] (Age 15 and up); Future         BMI:   Estimated body mass index is 41.63 kg/m  as calculated from the following:    Height as of this encounter: 1.549 m (5' 1\").    Weight as of this encounter: 99.9 kg (220 lb 4.8 oz).   Weight management plan: Discussed healthy diet and exercise guidelines           Return in about 1 " month (around 11/16/2020) for using a video visit/recheck.    Roshan Pelayo MD  [facility]

## 2020-10-16 NOTE — LETTER
October 16, 2020      RE: Jessie Seymour  [address] [place] [facility] [place] [address]        To whom it may concern:    Jessie Seymour is under my professional care for    Ground glass opacity present on imaging of lung  Morbid obesity (H)  Primary insomnia  Encounter for screening mammogram for breast cancer  Coronary artery disease involving native heart without angina pectoris, unspecified vessel or lesion type  Snoring  Mild major depression (H)  Status post surgery She  may return to work with the following: The employee is UNABLE to return to work until 10/21/2020        Sincerely,      Roshan Pelayo MD

## 2020-10-16 NOTE — PROGRESS NOTES
"Subjective     Jessie Seymour is a 49 year old female who presents to clinic today for the following health issues:    HPI         {Depression and Anxiety Followup:901159}    How many servings of fruits and vegetables do you eat daily?  { :487089}    On average, how many sweetened beverages do you drink each day (Examples: soda, juice, sweet tea, etc.  Do NOT count diet or artificially sweetened beverages)?   { 1-11:372717}    How many days per week do you exercise enough to make your heart beat faster? { :320487}    How many minutes a day do you exercise enough to make your heart beat faster? { :124575}    How many days per week do you miss taking your medication? {0-7 :055056}    Insomnia  Onset/Duration: ***  Description:   Frequency of insomnia:  {FREQUENCY AT NIGHT:102886}  Time to fall asleep (sleep latency): {.:898171}  Middle of night awakening:  {.:945573::\"no\"}  Early morning awakening:  {.:458520::\"no\"}  Progression of Symptoms:  {.:997378}  Accompanying Signs & Symptoms:  Daytime sleepiness/napping: {.:536895::\"no\"}  Excessive snoring/apnea: {.:416769::\"no\"}  Restless legs: {.:552164::\"no\"}  Waking to urinate: {.:104032::\"no\"}  Chronic pain:  {.:478558::\"no\"}  Depression symptoms (if yes, do PHQ9): {.:000406::\"no\"}  Anxiety symptoms (if yes, do BON-7): {.:376266::\"no\"}  History:  Prior Insomnia: {.:939099::\"no\"}  New stressful situation: {.:578760::\"no\"}  Precipitating factors:   Caffeine intake: {.:030535::\"no\"}  OTC decongestants: {.:163484::\"no\"}  Any new medications: {.:046105::\"no\"}  Alleviating factors:  Self medicating (alcohol, etc.):  {.:764717::\"no\"}  Stress-reduction (exercise, yoga, meditation etc): {.:340763::\"no\"}  Therapies tried and outcome: {INSOMNIA TREATMENTS TRIED:078338::\"none\"}      Review of Systems   {ROS COMP (Optional):933168}      Objective    LMP 06/22/2016 (Exact Date)   There is no height or weight on file to calculate BMI.  Physical Exam   {Exam List " (Optional):258354}    {Diagnostic Test Results (Optional):559647}        {PROVIDER CHARTING PREFERENCE:855315}

## 2020-10-17 ASSESSMENT — ANXIETY QUESTIONNAIRES: GAD7 TOTAL SCORE: 7

## 2020-10-19 ENCOUNTER — TELEPHONE (OUTPATIENT)
Dept: PEDIATRICS | Facility: CLINIC | Age: 50
End: 2020-10-19

## 2020-10-19 ENCOUNTER — HOSPITAL ENCOUNTER (OUTPATIENT)
Dept: BEHAVIORAL HEALTH | Facility: CLINIC | Age: 50
End: 2020-10-19
Attending: FAMILY MEDICINE
Payer: COMMERCIAL

## 2020-10-19 PROCEDURE — 999N000216 HC STATISTIC ADULT CD FACE TO FACE-NO CHRG: Mod: GT | Performed by: SOCIAL WORKER

## 2020-10-19 NOTE — TELEPHONE ENCOUNTER
Left message for patient to return clinic call regarding scheduling. Patient needs a video return  appointment for 1 month check with Roshan Pelayo MD around 11/16/2020. Number to clinic and Mychart option given, please assist in scheduling once patient returns clinic call.     Call Center OKAY TO SCHEDULE.    Thanks,   Daphne Calvo  Primary Care   [facility]

## 2020-10-19 NOTE — PROGRESS NOTES
Patient scheduled this appointment via My Chart and was expecting individual therapy.  Patient report she and her son had some traumatic things happen in the last year and both need counseling. She is looking for individual and family sessions. She is not interested in outpatient program and thought she was making an appointment for individual therapy. This writer called intake and helped patient schedule a therapy appointment.  Writer also offered resources outside of [facility] system where patient can call for appointments earlier than December if possible.      Clarisse Bae, [facility]  [facility]  Phone: [phone]

## 2020-11-02 ENCOUNTER — ANCILLARY PROCEDURE (OUTPATIENT)
Dept: CARDIOLOGY | Facility: CLINIC | Age: 50
End: 2020-11-02
Attending: FAMILY MEDICINE
Payer: COMMERCIAL

## 2020-11-02 DIAGNOSIS — I25.10 CORONARY ARTERY DISEASE INVOLVING NATIVE HEART WITHOUT ANGINA PECTORIS, UNSPECIFIED VESSEL OR LESION TYPE: ICD-10-CM

## 2020-11-02 PROCEDURE — 93352 ADMIN ECG CONTRAST AGENT: CPT | Performed by: INTERNAL MEDICINE

## 2020-11-02 PROCEDURE — 93321 DOPPLER ECHO F-UP/LMTD STD: CPT | Performed by: INTERNAL MEDICINE

## 2020-11-02 PROCEDURE — 93018 CV STRESS TEST I&R ONLY: CPT | Performed by: INTERNAL MEDICINE

## 2020-11-02 PROCEDURE — 93325 DOPPLER ECHO COLOR FLOW MAPG: CPT | Performed by: INTERNAL MEDICINE

## 2020-11-02 PROCEDURE — 93350 STRESS TTE ONLY: CPT | Performed by: INTERNAL MEDICINE

## 2020-11-02 PROCEDURE — 93017 CV STRESS TEST TRACING ONLY: CPT | Performed by: INTERNAL MEDICINE

## 2020-11-02 PROCEDURE — 93016 CV STRESS TEST SUPVJ ONLY: CPT | Performed by: INTERNAL MEDICINE

## 2020-11-02 RX ADMIN — Medication 5 ML: at 16:00

## 2020-11-07 ENCOUNTER — MYC REFILL (OUTPATIENT)
Dept: PEDIATRICS | Facility: CLINIC | Age: 50
End: 2020-11-07

## 2020-11-07 DIAGNOSIS — G43.009 MIGRAINE WITHOUT AURA AND WITHOUT STATUS MIGRAINOSUS, NOT INTRACTABLE: ICD-10-CM

## 2020-11-13 ENCOUNTER — ANCILLARY PROCEDURE (OUTPATIENT)
Dept: MAMMOGRAPHY | Facility: CLINIC | Age: 50
End: 2020-11-13
Attending: FAMILY MEDICINE
Payer: COMMERCIAL

## 2020-11-13 DIAGNOSIS — Z12.31 ENCOUNTER FOR SCREENING MAMMOGRAM FOR BREAST CANCER: ICD-10-CM

## 2020-11-13 PROCEDURE — 77063 BREAST TOMOSYNTHESIS BI: CPT | Mod: TC | Performed by: RADIOLOGY

## 2020-11-13 PROCEDURE — 77067 SCR MAMMO BI INCL CAD: CPT | Mod: TC | Performed by: RADIOLOGY

## 2020-11-13 RX ORDER — KETOROLAC TROMETHAMINE 10 MG/1
10 TABLET, FILM COATED ORAL EVERY 6 HOURS PRN
Qty: 4 TABLET | Refills: 0 | Status: SHIPPED | OUTPATIENT
Start: 2020-11-13 | End: 2021-07-07

## 2020-11-13 NOTE — TELEPHONE ENCOUNTER
Routing refill request to provider for review/approval because:  Drug not on the [facility] refill protocol     Kathya Condon RN  [facility]

## 2020-11-17 ENCOUNTER — VIRTUAL VISIT (OUTPATIENT)
Dept: PEDIATRICS | Facility: CLINIC | Age: 50
End: 2020-11-17
Payer: COMMERCIAL

## 2020-11-17 DIAGNOSIS — Z53.9 ERRONEOUS ENCOUNTER--DISREGARD: Primary | ICD-10-CM

## 2020-11-17 DIAGNOSIS — F51.01 PRIMARY INSOMNIA: ICD-10-CM

## 2020-11-17 RX ORDER — CLONAZEPAM 0.5 MG/1
.25-.5 TABLET ORAL
COMMUNITY
Start: 2020-11-17 | End: 2021-07-07

## 2020-11-17 ASSESSMENT — PATIENT HEALTH QUESTIONNAIRE - PHQ9: SUM OF ALL RESPONSES TO PHQ QUESTIONS 1-9: 11

## 2020-11-17 NOTE — PROGRESS NOTES
"Jessie Seymour is a 50 year old female who is being evaluated via a billable video visit.      The patient has been notified of following:     \"This video visit will be conducted via a call between you and your physician/provider. We have found that certain health care needs can be provided without the need for an in-person physical exam.  This service lets us provide the care you need with a video conversation.  If a prescription is necessary we can send it directly to your pharmacy.  If lab work is needed we can place an order for that and you can then stop by our lab to have the test done at a later time.    Video visits are billed at different rates depending on your insurance coverage.  Please reach out to your insurance provider with any questions.    If during the course of the call the physician/provider feels a video visit is not appropriate, you will not be charged for this service.\"    Patient has given verbal consent for Video visit? Yes  How would you like to obtain your AVS? MyChart  If you are dropped from the video visit, the video invite should be resent to: Text to cell phone: [phone]  Will anyone else be joining your video visit? No  {If patient encounters technical issues they should call [phone] :943130}    Subjective     Jessie Seymour is a 50 year old female who presents today via video visit for the following health issues:    HPI     Depression Followup    How are you doing with your depression since your last visit? No change    Are you having other symptoms that might be associated with depression? No    Have you had a significant life event?  No     Are you feeling anxious or having panic attacks?   Yes:  20-panic attacks.     Do you have any concerns with your use of alcohol or other drugs? No    Social History     Tobacco Use     Smoking status: Current Every Day Smoker     Packs/day: 0.50     Years: 15.00     Pack years: 7.50     Types: Cigarettes     Smokeless tobacco: " Never Used     Tobacco comment: Pt. smokes appx. 6 cigarettes daily   Substance Use Topics     Alcohol use: Yes     Alcohol/week: 0.0 standard drinks     Comment: Rarely     Drug use: No     PHQ 3/18/2020 9/23/2020 10/16/2020   PHQ-9 Total Score 6 10 8   Q9: Thoughts of better off dead/self-harm past 2 weeks Not at all Not at all Not at all     BON-7 SCORE 3/18/2020 9/23/2020 10/16/2020   Total Score - - -   Total Score 13 (moderate anxiety) - -   Total Score 13 12 7     Last PHQ-9 11/17/2020   1.  Little interest or pleasure in doing things 1   2.  Feeling down, depressed, or hopeless 2   3.  Trouble falling or staying asleep, or sleeping too much 2   4.  Feeling tired or having little energy 2   5.  Poor appetite or overeating 1   6.  Feeling bad about yourself 1   7.  Trouble concentrating 1   8.  Moving slowly or restless 1   Q9: Thoughts of better off dead/self-harm past 2 weeks 0   PHQ-9 Total Score 11   Difficulty at work, home, or with people Somewhat difficult     BON-7  10/16/2020   1. Feeling nervous, anxious, or on edge 1   2. Not being able to stop or control worrying 1   3. Worrying too much about different things 1   4. Trouble relaxing 1   5. Being so restless that it is hard to sit still 1   6. Becoming easily annoyed or irritable 1   7. Feeling afraid, as if something awful might happen 1   BON-7 Total Score 7   If you checked any problems, how difficult have they made it for you to do your work, take care of things at home, or get along with other people? -     {PROVIDER ONLY Complete follow-up questions for patients who report suicide ideation  (Optional):827802}    Suicide Assessment Five-step Evaluation and Treatment (SAFE-T)      How many servings of fruits and vegetables do you eat daily?  2-3    On average, how many sweetened beverages do you drink each day (Examples: soda, juice, sweet tea, etc.  Do NOT count diet or artificially sweetened beverages)?   6    How many days per week do you  "exercise enough to make your heart beat faster? 1-2    How many minutes a day do you exercise enough to make your heart beat faster? 10-30 minutes  How many days per week do you miss taking your medication? 1    What makes it hard for you to take your medications?  Life gets busy per the pt.      Video Start Time: {video visit start/end time for provider to select:521721}    {additonal problems for provider to add (Optional):056318}    Review of Systems   {ROS COMP (Optional):478276}      Objective           Vitals:  No vitals were obtained today due to virtual visit.    Physical Exam     {video visit exam brief selected:893220::\"GENERAL: Healthy, alert and no distress\",\"EYES: Eyes grossly normal to inspection.  No discharge or erythema, or obvious scleral/conjunctival abnormalities.\",\"RESP: No audible wheeze, cough, or visible cyanosis.  No visible retractions or increased work of breathing.  \",\"SKIN: Visible skin clear. No significant rash, abnormal pigmentation or lesions.\",\"NEURO: Cranial nerves grossly intact.  Mentation and speech appropriate for age.\",\"PSYCH: Mentation appears normal, affect normal/bright, judgement and insight intact, normal speech and appearance well-groomed.\"}      {Diagnostic Test Results (Optional):689329}        {PROVIDER CHARTING PREFERENCE:825733}      Video-Visit Details    Type of service:  Video Visit    Video End Time:{video visit start/end time for provider to select:612999}    Originating Location (pt. Location): {video visit patient location:030703::\"Home\"}    Distant Location (provider location):  [facility]     Platform used for Video Visit: {Virtual Visit Platforms:918687::\"[employer]\"}        "

## 2020-12-08 ENCOUNTER — VIRTUAL VISIT (OUTPATIENT)
Dept: PSYCHOLOGY | Facility: CLINIC | Age: 50
End: 2020-12-08
Payer: COMMERCIAL

## 2020-12-08 DIAGNOSIS — F33.0 MILD EPISODE OF RECURRENT MAJOR DEPRESSIVE DISORDER (H): Primary | ICD-10-CM

## 2020-12-08 DIAGNOSIS — F41.1 GAD (GENERALIZED ANXIETY DISORDER): ICD-10-CM

## 2020-12-08 PROCEDURE — 90834 PSYTX W PT 45 MINUTES: CPT | Mod: 95

## 2020-12-08 ASSESSMENT — ANXIETY QUESTIONNAIRES
1. FEELING NERVOUS, ANXIOUS, OR ON EDGE: NEARLY EVERY DAY
5. BEING SO RESTLESS THAT IT IS HARD TO SIT STILL: MORE THAN HALF THE DAYS
GAD7 TOTAL SCORE: 15
2. NOT BEING ABLE TO STOP OR CONTROL WORRYING: MORE THAN HALF THE DAYS
6. BECOMING EASILY ANNOYED OR IRRITABLE: MORE THAN HALF THE DAYS
3. WORRYING TOO MUCH ABOUT DIFFERENT THINGS: MORE THAN HALF THE DAYS
7. FEELING AFRAID AS IF SOMETHING AWFUL MIGHT HAPPEN: NEARLY EVERY DAY
4. TROUBLE RELAXING: SEVERAL DAYS

## 2020-12-08 ASSESSMENT — COLUMBIA-SUICIDE SEVERITY RATING SCALE - C-SSRS
1. IN THE PAST MONTH, HAVE YOU WISHED YOU WERE DEAD OR WISHED YOU COULD GO TO SLEEP AND NOT WAKE UP?: NO
TOTAL  NUMBER OF ABORTED OR SELF INTERRUPTED ATTEMPTS PAST LIFETIME: NO
REASONS FOR IDEATION LIFETIME: COMPLETELY TO END OR STOP THE PAIN (YOU COULDN'T GO ON LIVING WITH THE PAIN OR HOW YOU WERE FEELING)
4. HAVE YOU HAD THESE THOUGHTS AND HAD SOME INTENTION OF ACTING ON THEM?: NO
6. HAVE YOU EVER DONE ANYTHING, STARTED TO DO ANYTHING, OR PREPARED TO DO ANYTHING TO END YOUR LIFE?: NO
TOTAL  NUMBER OF INTERRUPTED ATTEMPTS LIFETIME: NO
2. HAVE YOU ACTUALLY HAD ANY THOUGHTS OF KILLING YOURSELF?: NO
TOTAL  NUMBER OF INTERRUPTED ATTEMPTS PAST 3 MONTHS: NO
6. HAVE YOU EVER DONE ANYTHING, STARTED TO DO ANYTHING, OR PREPARED TO DO ANYTHING TO END YOUR LIFE?: NO
ATTEMPT LIFETIME: YES
ATTEMPT PAST THREE MONTHS: NO
3. HAVE YOU BEEN THINKING ABOUT HOW YOU MIGHT KILL YOURSELF?: YES
TOTAL  NUMBER OF ABORTED OR SELF INTERRUPTED ATTEMPTS PAST 3 MONTHS: NO
1. IN THE PAST MONTH, HAVE YOU WISHED YOU WERE DEAD OR WISHED YOU COULD GO TO SLEEP AND NOT WAKE UP?: YES
4. HAVE YOU HAD THESE THOUGHTS AND HAD SOME INTENTION OF ACTING ON THEM?: NO
2. HAVE YOU ACTUALLY HAD ANY THOUGHTS OF KILLING YOURSELF LIFETIME?: YES

## 2020-12-08 NOTE — PROGRESS NOTES
"                 Progress Note - Initial Visit    Client Name:  Jessie Seymour Date: 2020         Service Type: Individual     Visit Start Time: 10:11am  Visit End Time: 10:50am    Visit #: 1    Attendees: Client attended alone    Service Modality:  Phone Visit:      Provider verified identity through the following two step process.  Patient provided:  Patient  and Patient address    The patient has been notified of the following:      \"We have found that certain health care needs can be provided without the need for a face to face visit.  This service lets us provide the care you need with a phone conversation.       I will have full access to your [place] medical record during this entire phone call.   I will be taking notes for your medical record.      Since this is like an office visit, we will bill your insurance company for this service.       There are potential benefits and risks of telephone visits (e.g. limits to patient confidentiality) that differ from in-person visits.?  Confidentiality still applies for telephone services, and nobody will record the visit.  It is important to be in a quiet, private space that is free of distractions (including cell phone or other devices) during the visit.??      If during the course of the call I believe a telephone visit is not appropriate, you will not be charged for this service\"     Consent has been obtained for this service by care team member: Yes        DATA:   Interactive Complexity: No   Crisis: No     Presenting Concerns/  Current Stressors:    The patient reported that she is currently seeking therapy services for her and her son to process trauma that they have experienced over the past year. The patient stated that she has been having a difficult time processing this trauma and feels that her son is also have difficulty processing.        The patient did report that she is seeing an individual therapist with an agency that she cannot recall " the name of. She stated that she will have been seeing this provider for a year in February.     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 The patient stated that she is currently furloughed from [employer].       ASSESSMENT:  Mental Status Assessment:  Appearance:   Unable to assess over the phone.   Eye Contact:   Unable to assess over the phone.   Psychomotor Behavior: Unable to assess over the phone.   Attitude:   Cooperative   Orientation:   All  Speech   Rate / Production: Emotional   Volume:  Normal   Mood:    Irritable   Affect:    Tearful  Thought Content:  Clear   Thought Form:  Coherent  Logical   Insight:    Fair       Safety Issues and Plan for Safety and Risk Management:     [place] Suicide Severity Rating Scale (Lifetime/Recent)  [place] Suicide Severity Rating (Lifetime/Recent) 12/8/2020   1. Wish to be Dead (Lifetime) Yes   1. Wish to be Dead (Recent) No   2. Non-Specific Active Suicidal Thoughts (Lifetime) Yes   Non-Specific Active Suicidal Thought Description (Lifetime) (No Data)   Comments Stated had these thoughts last year.    2. Non-Specific Active Suicidal Thoughts (Recent) No   3. Active Suicidal Ideation with any Methods (Not Plan) Without Intent to Act (Lifetime) Yes   3. Active Sucidal Ideation with any Methods (Not Plan) Without Intent to Act (Recent) No   4. Active Suicidal Ideation with Some Intent to Act, Without Specific Plan (Lifetime) No   4. Active Suicidal Ideation with Some Intent to Act, Without Specific Plan (Recent) No   Most Severe Ideation Rating (Lifetime) 3   Frequency (Lifetime) 2   Duration (Lifetime) 1   Controllability (Lifetime) 1   Protective Factors  (Lifetime) 1   Reasons for Ideation (Lifetime) 5   Actual Attempt (Lifetime) Yes   Actual Attempt Description (Lifetime) (No Data)   Comments Took some pills 30+ years ago.   Actual Attempt (Past 3 Months) No   Has subject engaged in non-suicidal  self-injurious behavior? (Lifetime) Yes   Comments Cut once as a teenager.   Has subject engaged in non-suicidal self-injurious behavior? (Past 3 Months) No   Interrupted Attempts (Lifetime) No   Interrupted Attempts (Past 3 Months) No   Aborted or Self-Interrupted Attempt (Lifetime) No   Aborted or Self-Interrupted Attempt (Past 3 Months) No   Preparatory Acts or Behavior (Lifetime) No   Preparatory Acts or Behavior (Past 3 Months) No     Patient denies current fears or concerns for personal safety.  Patient denies current or recent suicidal ideation or behaviors.  Patient denies current or recent homicidal ideation or behaviors.  Patient denies current or recent self injurious behavior or ideation.  Patient denies other safety concerns.  A safety and risk management plan has been developed including: Patient consented to co-developed safety plan.  Safety and risk management plan was completed.  Patient agreed to use safety plan should any safety concerns arise.  A copy was given to the patient.  Patient reports there are no firearms in the house.     Diagnostic Criteria:  A. Excessive anxiety and worry about a number of events or activities (such as work or school performance).   B. The person finds it difficult to control the worry.   - Restlessness or feeling keyed up or on edge.    - Being easily fatigued.    - Difficulty concentrating or mind going blank.    - Irritability.   D. The focus of the anxiety and worry is not confined to features of an Axis I disorder.  E. The anxiety, worry, or physical symptoms cause clinically significant distress or impairment in social, occupational, or other important areas of functioning.   F. The disturbance is not due to the direct physiological effects of a substance (e.g., a drug of abuse, a medication) or a general medical condition (e.g., hyperthyroidism) and does not occur exclusively during a Mood Disorder, a Psychotic Disorder, or a Pervasive Developmental  Disorder.    - The aformentioned symptoms began 1 year(s) ago and occurs 7 days per week and is experienced as moderate.  A) Recurrent episode(s) - symptoms have been present during the same 2-week period and represent a change from previous functioning 5 or more symptoms (required for diagnosis)   - Depressed mood. Note: In children and adolescents, can be irritable mood.     - Diminished interest or pleasure in all, or almost all, activities.    - Fatigue or loss of energy.    - Diminished ability to think or concentrate, or indecisiveness.   B) The symptoms cause clinically significant distress or impairment in social, occupational, or other important areas of functioning  C) The episode is not attributable to the physiological effects of a substance or to another medical condition  D) The occurence of major depressive episode is not better explained by other thought / psychotic disorders  E) There has never been a manic episode or hypomanic episode      DSM5 Diagnoses: (Sustained by DSM5 Criteria Listed Above)  Diagnoses: 296.31 (F33.0) Major Depressive Disorder, Recurrent Episode, Mild With anxious distress  300.02 (F41.1) Generalized Anxiety Disorder  Psychosocial & Contextual Factors: Hx of depression, Hx of trauma, recent loss  WHODAS 2.0 (12 item):   WHODAS 2.0 Total Score 12/8/2020   Total Score 19     Intervention:   MI: Asked open ended questions to assess the patient's motivation and readiness for therapy.   Collateral Reports Completed:  Routed note to PCP      PLAN: (Homework, other):  Patient will likely be transferred to another provider due to conflict of interest. Safety plan developed during the session.       DUANE BETLRE, JANINE  December 8, 2020  Service Performed and Documented by [facility]-   Note reviewed and clinical supervision by DUANE Gamez [facility] 12/19/2020    _________________________________________________________________________________________                             "                   [facility]     SAFETY PLAN:  Step 1: Warning signs / cues (Thoughts, images, mood, situation, behavior) that a crisis may be developing:    Thoughts: \"People would be better off without me\", \"I can't do this anymore\" and \"I just want this to end\"    Thinking Processes: racing thoughts and intrusive thoughts (bothersome, unwanted thoughts that come out of nowhere): recurrent traumatic moments    Mood: worsening depression, hopelessness, helplessness, intense anger, intense worry, agitation and mood swings    Behaviors: isolating/withdrawing , can't stop crying, not taking care of myself and not taking care of my responsibilities    Situations: anniversary of a death of someone important to you and trauma    Step 2: Coping strategies - Things I can do to take my mind off of my problems without contacting another person (relaxation technique, physical activity):    Distress Tolerance Strategies:  relaxation activities: deep breathing, coloring , read a book: Uplifting or motivational, change body temperature (ice pack/cold water)  and paced breathing/progressive muscle relaxation    Physical Activities: go for a walk, gardening, meditation, deep breathing and stretching     Focus on helpful thoughts:  \"This is temporary\" and \"I will get through this\"   Step 3: Remind myself of people and things that are important to me and worth living for:  Son  Step 4: Making the environment safe:     remove alcohol, remove drugs, dispose of old medications , remove things I could use to hurt myself: sharp objects (i.e. knives) and be around others  Step 5: Professionals or agencies I can contact during a crisis:    [facility] Daytime Number: [phone]    Suicide Prevention Lifeline: 7-710-566-TALK (0611)    Crisis Text Line Service (available 24 hours a day, 7 days a week): Text [place] to 587190  Local Crisis Services: [facility] [phone]    Call 911 or go to my nearest emergency " department.   I helped develop this safety plan and agree to use it when needed.  I have been given a copy of this plan.      Client signature _________________________________________________________________  Today s date:  12/8/2020  Adapted from Safety Plan Template 2008 Sherri Cordon and Dany Haq is reprinted with the express permission of the authors.  No portion of the Safety Plan Template may be reproduced without the express, written permission.  You can contact the authors at [email or web address] or [email or web address].

## 2020-12-08 NOTE — LETTER
"    2020         RE: Jessie Seymour  [address] [address]        Dear Colleague,    Thank you for referring your patient, Jessie Seymour, to the [facility]. Please see a copy of my visit note below.                     Progress Note - Initial Visit    Client Name:  Jessie Seymour Date: 2020         Service Type: Individual     Visit Start Time: 10:11am  Visit End Time: 10:50am    Visit #: 1    Attendees: Client attended alone    Service Modality:  Phone Visit:      Provider verified identity through the following two step process.  Patient provided:  Patient  and Patient address    The patient has been notified of the following:      \"We have found that certain health care needs can be provided without the need for a face to face visit.  This service lets us provide the care you need with a phone conversation.       I will have full access to your [place] medical record during this entire phone call.   I will be taking notes for your medical record.      Since this is like an office visit, we will bill your insurance company for this service.       There are potential benefits and risks of telephone visits (e.g. limits to patient confidentiality) that differ from in-person visits.?  Confidentiality still applies for telephone services, and nobody will record the visit.  It is important to be in a quiet, private space that is free of distractions (including cell phone or other devices) during the visit.??      If during the course of the call I believe a telephone visit is not appropriate, you will not be charged for this service\"     Consent has been obtained for this service by care team member: Yes        DATA:   Interactive Complexity: No   Crisis: No     Presenting Concerns/  Current Stressors:    The patient reported that she is currently seeking therapy services for her and her son to process trauma that they have experienced over the " past year. The patient stated that she has been having a difficult time processing this trauma and feels that her son is also have difficulty processing.        The patient did report thatrobert is seeing an individual therapist with an agency that she cannot recall the name of. She stated that she will have been seeing this provider for a year in February.     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 The patient stated that she is currently furloughed from [employer].       ASSESSMENT:  Mental Status Assessment:  Appearance:   Unable to assess over the phone.   Eye Contact:   Unable to assess over the phone.   Psychomotor Behavior: Unable to assess over the phone.   Attitude:   Cooperative   Orientation:   All  Speech   Rate / Production: Emotional   Volume:  Normal   Mood:    Irritable   Affect:    Tearful  Thought Content:  Clear   Thought Form:  Coherent  Logical   Insight:    Fair       Safety Issues and Plan for Safety and Risk Management:     [place] Suicide Severity Rating Scale (Lifetime/Recent)  [place] Suicide Severity Rating (Lifetime/Recent) 12/8/2020   1. Wish to be Dead (Lifetime) Yes   1. Wish to be Dead (Recent) No   2. Non-Specific Active Suicidal Thoughts (Lifetime) Yes   Non-Specific Active Suicidal Thought Description (Lifetime) (No Data)   Comments Stated had these thoughts last year.    2. Non-Specific Active Suicidal Thoughts (Recent) No   3. Active Suicidal Ideation with any Methods (Not Plan) Without Intent to Act (Lifetime) Yes   3. Active Sucidal Ideation with any Methods (Not Plan) Without Intent to Act (Recent) No   4. Active Suicidal Ideation with Some Intent to Act, Without Specific Plan (Lifetime) No   4. Active Suicidal Ideation with Some Intent to Act, Without Specific Plan (Recent) No   Most Severe Ideation Rating (Lifetime) 3   Frequency (Lifetime) 2   Duration (Lifetime) 1   Controllability (Lifetime) 1    Protective Factors  (Lifetime) 1   Reasons for Ideation (Lifetime) 5   Actual Attempt (Lifetime) Yes   Actual Attempt Description (Lifetime) (No Data)   Comments Took some pills 30+ years ago.   Actual Attempt (Past 3 Months) No   Has subject engaged in non-suicidal self-injurious behavior? (Lifetime) Yes   Comments Cut once as a teenager.   Has subject engaged in non-suicidal self-injurious behavior? (Past 3 Months) No   Interrupted Attempts (Lifetime) No   Interrupted Attempts (Past 3 Months) No   Aborted or Self-Interrupted Attempt (Lifetime) No   Aborted or Self-Interrupted Attempt (Past 3 Months) No   Preparatory Acts or Behavior (Lifetime) No   Preparatory Acts or Behavior (Past 3 Months) No     Patient denies current fears or concerns for personal safety.  Patient denies current or recent suicidal ideation or behaviors.  Patient denies current or recent homicidal ideation or behaviors.  Patient denies current or recent self injurious behavior or ideation.  Patient denies other safety concerns.  A safety and risk management plan has been developed including: Patient consented to co-developed safety plan.  Safety and risk management plan was completed.  Patient agreed to use safety plan should any safety concerns arise.  A copy was given to the patient.  Patient reports there are no firearms in the house.     Diagnostic Criteria:  A. Excessive anxiety and worry about a number of events or activities (such as work or school performance).   B. The person finds it difficult to control the worry.   - Restlessness or feeling keyed up or on edge.    - Being easily fatigued.    - Difficulty concentrating or mind going blank.    - Irritability.   D. The focus of the anxiety and worry is not confined to features of an Axis I disorder.  E. The anxiety, worry, or physical symptoms cause clinically significant distress or impairment in social, occupational, or other important areas of functioning.   F. The disturbance is  not due to the direct physiological effects of a substance (e.g., a drug of abuse, a medication) or a general medical condition (e.g., hyperthyroidism) and does not occur exclusively during a Mood Disorder, a Psychotic Disorder, or a Pervasive Developmental Disorder.    - The aformentioned symptoms began 1 year(s) ago and occurs 7 days per week and is experienced as moderate.  A) Recurrent episode(s) - symptoms have been present during the same 2-week period and represent a change from previous functioning 5 or more symptoms (required for diagnosis)   - Depressed mood. Note: In children and adolescents, can be irritable mood.     - Diminished interest or pleasure in all, or almost all, activities.    - Fatigue or loss of energy.    - Diminished ability to think or concentrate, or indecisiveness.   B) The symptoms cause clinically significant distress or impairment in social, occupational, or other important areas of functioning  C) The episode is not attributable to the physiological effects of a substance or to another medical condition  D) The occurence of major depressive episode is not better explained by other thought / psychotic disorders  E) There has never been a manic episode or hypomanic episode      DSM5 Diagnoses: (Sustained by DSM5 Criteria Listed Above)  Diagnoses: 296.31 (F33.0) Major Depressive Disorder, Recurrent Episode, Mild With anxious distress  300.02 (F41.1) Generalized Anxiety Disorder  Psychosocial & Contextual Factors: Hx of depression, Hx of trauma, recent loss  WHODAS 2.0 (12 item):   WHODAS 2.0 Total Score 12/8/2020   Total Score 19     Intervention:   MI: Asked open ended questions to assess the patient's motivation and readiness for therapy.   Collateral Reports Completed:  Routed note to PCP      PLAN: (Homework, other):  Patient will likely be transferred to another provider due to conflict of interest. Safety plan developed during the session.       DUANE BELTRE,  "[facility]  December 8, 2020                                               Jessie HEBERT [facility]     SAFETY PLAN:  Step 1: Warning signs / cues (Thoughts, images, mood, situation, behavior) that a crisis may be developing:    Thoughts: \"People would be better off without me\", \"I can't do this anymore\" and \"I just want this to end\"    Thinking Processes: racing thoughts and intrusive thoughts (bothersome, unwanted thoughts that come out of nowhere): recurrent traumatic moments    Mood: worsening depression, hopelessness, helplessness, intense anger, intense worry, agitation and mood swings    Behaviors: isolating/withdrawing , can't stop crying, not taking care of myself and not taking care of my responsibilities    Situations: anniversary of a death of someone important to you and trauma    Step 2: Coping strategies - Things I can do to take my mind off of my problems without contacting another person (relaxation technique, physical activity):    Distress Tolerance Strategies:  relaxation activities: deep breathing, coloring , read a book: Uplifting or motivational, change body temperature (ice pack/cold water)  and paced breathing/progressive muscle relaxation    Physical Activities: go for a walk, gardening, meditation, deep breathing and stretching     Focus on helpful thoughts:  \"This is temporary\" and \"I will get through this\"   Step 3: Remind myself of people and things that are important to me and worth living for:  Son  Step 4: Making the environment safe:     remove alcohol, remove drugs, dispose of old medications , remove things I could use to hurt myself: sharp objects (i.e. knives) and be around others  Step 5: Professionals or agencies I can contact during a crisis:    [facility] Daytime Number: [phone]    Suicide Prevention Lifeline: 7-428-507-TALK (0990)    Crisis Text Line Service (available 24 hours a day, 7 days a week): Text [place] to 014236  [facility] Crisis Services: [facility]" [phone]    Call 911 or go to my nearest emergency department.   I helped develop this safety plan and agree to use it when needed.  I have been given a copy of this plan.      Client signature _________________________________________________________________  Today s date:  12/8/2020  Adapted from Safety Plan Template 2008 Sherri Cordon and Dany Haq is reprinted with the express permission of the authors.  No portion of the Safety Plan Template may be reproduced without the express, written permission.  You can contact the authors at [email or web address] or [email or web address].                            Again, thank you for allowing me to participate in the care of your patient.        Sincerely,        Nancy Pascual

## 2020-12-09 ASSESSMENT — ANXIETY QUESTIONNAIRES: GAD7 TOTAL SCORE: 15

## 2020-12-14 NOTE — PATIENT INSTRUCTIONS
"                                           Jessie ANUP [facility]     SAFETY PLAN:  Step 1: Warning signs / cues (Thoughts, images, mood, situation, behavior) that a crisis may be developing:    Thoughts: \"People would be better off without me\", \"I can't do this anymore\" and \"I just want this to end\"    Thinking Processes: racing thoughts and intrusive thoughts (bothersome, unwanted thoughts that come out of nowhere): recurrent traumatic moments    Mood: worsening depression, hopelessness, helplessness, intense anger, intense worry, agitation and mood swings    Behaviors: isolating/withdrawing , can't stop crying, not taking care of myself and not taking care of my responsibilities    Situations: anniversary of a death of someone important to you and trauma    Step 2: Coping strategies - Things I can do to take my mind off of my problems without contacting another person (relaxation technique, physical activity):    Distress Tolerance Strategies:  relaxation activities: deep breathing, coloring , read a book: Uplifting or motivational, change body temperature (ice pack/cold water)  and paced breathing/progressive muscle relaxation    Physical Activities: go for a walk, gardening, meditation, deep breathing and stretching     Focus on helpful thoughts:  \"This is temporary\" and \"I will get through this\"   Step 3: Remind myself of people and things that are important to me and worth living for:  Son  Step 4: Making the environment safe:     remove alcohol, remove drugs, dispose of old medications , remove things I could use to hurt myself: sharp objects (i.e. knives) and be around others  Step 5: Professionals or agencies I can contact during a crisis:    [facility] Daytime Number: [phone]    Suicide Prevention Lifeline: 6-865-032-TALK (0474)    Crisis Text Line Service (available 24 hours a day, 7 days a week): Text [place] to 659740  Local Crisis Services: [facility] [phone]    Call 911 or go to " my nearest emergency department.   I helped develop this safety plan and agree to use it when needed.  I have been given a copy of this plan.      Client signature _________________________________________________________________  Today s date:  12/8/2020  Adapted from Safety Plan Template 2008 Sherri Cordon and Dany Haq is reprinted with the express permission of the authors.  No portion of the Safety Plan Template may be reproduced without the express, written permission.  You can contact the authors at [email or web address] or [email or web address].

## 2021-01-05 ENCOUNTER — OFFICE VISIT - HEALTHEAST (OUTPATIENT)
Dept: BEHAVIORAL HEALTH | Facility: CLINIC | Age: 51
End: 2021-01-05

## 2021-01-05 DIAGNOSIS — F43.21 GRIEF REACTION: ICD-10-CM

## 2021-01-05 ASSESSMENT — PATIENT HEALTH QUESTIONNAIRE - PHQ9: SUM OF ALL RESPONSES TO PHQ QUESTIONS 1-9: 16

## 2021-01-05 ASSESSMENT — ANXIETY QUESTIONNAIRES
5. BEING SO RESTLESS THAT IT IS HARD TO SIT STILL: MORE THAN HALF THE DAYS
7. FEELING AFRAID AS IF SOMETHING AWFUL MIGHT HAPPEN: MORE THAN HALF THE DAYS
GAD7 TOTAL SCORE: 17
6. BECOMING EASILY ANNOYED OR IRRITABLE: SEVERAL DAYS
2. NOT BEING ABLE TO STOP OR CONTROL WORRYING: NEARLY EVERY DAY
1. FEELING NERVOUS, ANXIOUS, OR ON EDGE: NEARLY EVERY DAY
3. WORRYING TOO MUCH ABOUT DIFFERENT THINGS: NEARLY EVERY DAY
4. TROUBLE RELAXING: NEARLY EVERY DAY
IF YOU CHECKED OFF ANY PROBLEMS ON THIS QUESTIONNAIRE, HOW DIFFICULT HAVE THESE PROBLEMS MADE IT FOR YOU TO DO YOUR WORK, TAKE CARE OF THINGS AT HOME, OR GET ALONG WITH OTHER PEOPLE: VERY DIFFICULT

## 2021-01-12 ENCOUNTER — AMBULATORY - HEALTHEAST (OUTPATIENT)
Dept: BEHAVIORAL HEALTH | Facility: CLINIC | Age: 51
End: 2021-01-12

## 2021-01-14 ENCOUNTER — AMBULATORY - HEALTHEAST (OUTPATIENT)
Dept: BEHAVIORAL HEALTH | Facility: HOSPITAL | Age: 51
End: 2021-01-14

## 2021-01-18 ENCOUNTER — OFFICE VISIT - HEALTHEAST (OUTPATIENT)
Dept: BEHAVIORAL HEALTH | Facility: HOSPITAL | Age: 51
End: 2021-01-18

## 2021-01-18 DIAGNOSIS — F43.21 GRIEF REACTION: ICD-10-CM

## 2021-01-18 ASSESSMENT — PATIENT HEALTH QUESTIONNAIRE - PHQ9: SUM OF ALL RESPONSES TO PHQ QUESTIONS 1-9: 17

## 2021-01-25 ENCOUNTER — OFFICE VISIT - HEALTHEAST (OUTPATIENT)
Dept: BEHAVIORAL HEALTH | Facility: HOSPITAL | Age: 51
End: 2021-01-25

## 2021-01-25 DIAGNOSIS — F33.1 MODERATE EPISODE OF RECURRENT MAJOR DEPRESSIVE DISORDER (H): ICD-10-CM

## 2021-01-25 DIAGNOSIS — F43.21 GRIEF REACTION: ICD-10-CM

## 2021-01-25 DIAGNOSIS — F41.1 GENERALIZED ANXIETY DISORDER: ICD-10-CM

## 2021-01-25 ASSESSMENT — ANXIETY QUESTIONNAIRES
GAD7 TOTAL SCORE: 16
7. FEELING AFRAID AS IF SOMETHING AWFUL MIGHT HAPPEN: SEVERAL DAYS
3. WORRYING TOO MUCH ABOUT DIFFERENT THINGS: NEARLY EVERY DAY
5. BEING SO RESTLESS THAT IT IS HARD TO SIT STILL: NOT AT ALL
IF YOU CHECKED OFF ANY PROBLEMS ON THIS QUESTIONNAIRE, HOW DIFFICULT HAVE THESE PROBLEMS MADE IT FOR YOU TO DO YOUR WORK, TAKE CARE OF THINGS AT HOME, OR GET ALONG WITH OTHER PEOPLE: VERY DIFFICULT
6. BECOMING EASILY ANNOYED OR IRRITABLE: NEARLY EVERY DAY
2. NOT BEING ABLE TO STOP OR CONTROL WORRYING: NEARLY EVERY DAY
4. TROUBLE RELAXING: NEARLY EVERY DAY
1. FEELING NERVOUS, ANXIOUS, OR ON EDGE: NEARLY EVERY DAY

## 2021-01-25 ASSESSMENT — PATIENT HEALTH QUESTIONNAIRE - PHQ9: SUM OF ALL RESPONSES TO PHQ QUESTIONS 1-9: 17

## 2021-02-01 ENCOUNTER — COMMUNICATION - HEALTHEAST (OUTPATIENT)
Dept: BEHAVIORAL HEALTH | Facility: HOSPITAL | Age: 51
End: 2021-02-01

## 2021-02-01 ENCOUNTER — OFFICE VISIT - HEALTHEAST (OUTPATIENT)
Dept: BEHAVIORAL HEALTH | Facility: HOSPITAL | Age: 51
End: 2021-02-01

## 2021-02-01 ENCOUNTER — AMBULATORY - HEALTHEAST (OUTPATIENT)
Dept: BEHAVIORAL HEALTH | Facility: HOSPITAL | Age: 51
End: 2021-02-01

## 2021-02-01 DIAGNOSIS — F43.21 GRIEF REACTION: ICD-10-CM

## 2021-02-01 DIAGNOSIS — F33.1 MODERATE EPISODE OF RECURRENT MAJOR DEPRESSIVE DISORDER (H): ICD-10-CM

## 2021-02-01 DIAGNOSIS — F41.1 GENERALIZED ANXIETY DISORDER: ICD-10-CM

## 2021-02-01 ASSESSMENT — PATIENT HEALTH QUESTIONNAIRE - PHQ9: SUM OF ALL RESPONSES TO PHQ QUESTIONS 1-9: 17

## 2021-02-01 ASSESSMENT — ANXIETY QUESTIONNAIRES
5. BEING SO RESTLESS THAT IT IS HARD TO SIT STILL: NOT AT ALL
6. BECOMING EASILY ANNOYED OR IRRITABLE: NEARLY EVERY DAY
1. FEELING NERVOUS, ANXIOUS, OR ON EDGE: NEARLY EVERY DAY
2. NOT BEING ABLE TO STOP OR CONTROL WORRYING: NEARLY EVERY DAY
4. TROUBLE RELAXING: NEARLY EVERY DAY
3. WORRYING TOO MUCH ABOUT DIFFERENT THINGS: NEARLY EVERY DAY
GAD7 TOTAL SCORE: 16
IF YOU CHECKED OFF ANY PROBLEMS ON THIS QUESTIONNAIRE, HOW DIFFICULT HAVE THESE PROBLEMS MADE IT FOR YOU TO DO YOUR WORK, TAKE CARE OF THINGS AT HOME, OR GET ALONG WITH OTHER PEOPLE: VERY DIFFICULT
7. FEELING AFRAID AS IF SOMETHING AWFUL MIGHT HAPPEN: SEVERAL DAYS

## 2021-02-08 ENCOUNTER — OFFICE VISIT - HEALTHEAST (OUTPATIENT)
Dept: BEHAVIORAL HEALTH | Facility: HOSPITAL | Age: 51
End: 2021-02-08

## 2021-02-08 ENCOUNTER — COMMUNICATION - HEALTHEAST (OUTPATIENT)
Dept: BEHAVIORAL HEALTH | Facility: HOSPITAL | Age: 51
End: 2021-02-08

## 2021-02-08 DIAGNOSIS — F33.1 MODERATE EPISODE OF RECURRENT MAJOR DEPRESSIVE DISORDER (H): ICD-10-CM

## 2021-02-08 DIAGNOSIS — F43.21 GRIEF REACTION: ICD-10-CM

## 2021-02-08 ASSESSMENT — ANXIETY QUESTIONNAIRES
3. WORRYING TOO MUCH ABOUT DIFFERENT THINGS: MORE THAN HALF THE DAYS
4. TROUBLE RELAXING: MORE THAN HALF THE DAYS
IF YOU CHECKED OFF ANY PROBLEMS ON THIS QUESTIONNAIRE, HOW DIFFICULT HAVE THESE PROBLEMS MADE IT FOR YOU TO DO YOUR WORK, TAKE CARE OF THINGS AT HOME, OR GET ALONG WITH OTHER PEOPLE: SOMEWHAT DIFFICULT
1. FEELING NERVOUS, ANXIOUS, OR ON EDGE: MORE THAN HALF THE DAYS
5. BEING SO RESTLESS THAT IT IS HARD TO SIT STILL: NOT AT ALL
GAD7 TOTAL SCORE: 11
2. NOT BEING ABLE TO STOP OR CONTROL WORRYING: MORE THAN HALF THE DAYS
7. FEELING AFRAID AS IF SOMETHING AWFUL MIGHT HAPPEN: SEVERAL DAYS
6. BECOMING EASILY ANNOYED OR IRRITABLE: MORE THAN HALF THE DAYS

## 2021-02-08 ASSESSMENT — PATIENT HEALTH QUESTIONNAIRE - PHQ9: SUM OF ALL RESPONSES TO PHQ QUESTIONS 1-9: 11

## 2021-02-15 ENCOUNTER — AMBULATORY - HEALTHEAST (OUTPATIENT)
Dept: BEHAVIORAL HEALTH | Facility: HOSPITAL | Age: 51
End: 2021-02-15

## 2021-03-02 ENCOUNTER — AMBULATORY - HEALTHEAST (OUTPATIENT)
Dept: BEHAVIORAL HEALTH | Facility: CLINIC | Age: 51
End: 2021-03-02

## 2021-03-02 ENCOUNTER — OFFICE VISIT - HEALTHEAST (OUTPATIENT)
Dept: BEHAVIORAL HEALTH | Facility: CLINIC | Age: 51
End: 2021-03-02

## 2021-03-02 ENCOUNTER — COMMUNICATION - HEALTHEAST (OUTPATIENT)
Dept: BEHAVIORAL HEALTH | Facility: HOSPITAL | Age: 51
End: 2021-03-02

## 2021-03-02 DIAGNOSIS — F43.21 GRIEF REACTION: ICD-10-CM

## 2021-03-02 DIAGNOSIS — F41.1 GENERALIZED ANXIETY DISORDER: ICD-10-CM

## 2021-03-02 DIAGNOSIS — F33.1 MODERATE EPISODE OF RECURRENT MAJOR DEPRESSIVE DISORDER (H): ICD-10-CM

## 2021-03-15 ENCOUNTER — OFFICE VISIT - HEALTHEAST (OUTPATIENT)
Dept: BEHAVIORAL HEALTH | Facility: HOSPITAL | Age: 51
End: 2021-03-15

## 2021-03-15 ENCOUNTER — COMMUNICATION - HEALTHEAST (OUTPATIENT)
Dept: BEHAVIORAL HEALTH | Facility: HOSPITAL | Age: 51
End: 2021-03-15

## 2021-03-15 DIAGNOSIS — F43.21 GRIEF REACTION: ICD-10-CM

## 2021-03-15 DIAGNOSIS — F33.1 MODERATE EPISODE OF RECURRENT MAJOR DEPRESSIVE DISORDER (H): ICD-10-CM

## 2021-03-15 ASSESSMENT — ANXIETY QUESTIONNAIRES
5. BEING SO RESTLESS THAT IT IS HARD TO SIT STILL: SEVERAL DAYS
3. WORRYING TOO MUCH ABOUT DIFFERENT THINGS: MORE THAN HALF THE DAYS
1. FEELING NERVOUS, ANXIOUS, OR ON EDGE: MORE THAN HALF THE DAYS
7. FEELING AFRAID AS IF SOMETHING AWFUL MIGHT HAPPEN: MORE THAN HALF THE DAYS
6. BECOMING EASILY ANNOYED OR IRRITABLE: MORE THAN HALF THE DAYS
2. NOT BEING ABLE TO STOP OR CONTROL WORRYING: SEVERAL DAYS
4. TROUBLE RELAXING: MORE THAN HALF THE DAYS
GAD7 TOTAL SCORE: 12

## 2021-03-15 ASSESSMENT — PATIENT HEALTH QUESTIONNAIRE - PHQ9: SUM OF ALL RESPONSES TO PHQ QUESTIONS 1-9: 11

## 2021-03-20 ENCOUNTER — HEALTH MAINTENANCE LETTER (OUTPATIENT)
Age: 51
End: 2021-03-20

## 2021-03-29 ENCOUNTER — OFFICE VISIT - HEALTHEAST (OUTPATIENT)
Dept: BEHAVIORAL HEALTH | Facility: HOSPITAL | Age: 51
End: 2021-03-29

## 2021-03-29 DIAGNOSIS — F43.21 GRIEF REACTION: ICD-10-CM

## 2021-03-29 DIAGNOSIS — F41.1 GENERALIZED ANXIETY DISORDER: ICD-10-CM

## 2021-03-29 DIAGNOSIS — F33.1 MODERATE EPISODE OF RECURRENT MAJOR DEPRESSIVE DISORDER (H): ICD-10-CM

## 2021-04-02 ENCOUNTER — APPOINTMENT (OUTPATIENT)
Dept: GENERAL RADIOLOGY | Facility: CLINIC | Age: 51
End: 2021-04-02
Attending: EMERGENCY MEDICINE
Payer: COMMERCIAL

## 2021-04-02 ENCOUNTER — HOSPITAL ENCOUNTER (EMERGENCY)
Facility: CLINIC | Age: 51
Discharge: HOME | End: 2021-04-02
Attending: EMERGENCY MEDICINE | Admitting: EMERGENCY MEDICINE
Payer: COMMERCIAL

## 2021-04-02 VITALS
OXYGEN SATURATION: 96 % | RESPIRATION RATE: 18 BRPM | DIASTOLIC BLOOD PRESSURE: 59 MMHG | HEART RATE: 86 BPM | TEMPERATURE: 99.4 F | SYSTOLIC BLOOD PRESSURE: 126 MMHG

## 2021-04-02 DIAGNOSIS — F17.210 CIGARETTE SMOKER: ICD-10-CM

## 2021-04-02 DIAGNOSIS — J18.9 PNEUMONIA OF LEFT LOWER LOBE DUE TO INFECTIOUS ORGANISM: ICD-10-CM

## 2021-04-02 DIAGNOSIS — Z11.52 ENCOUNTER FOR SCREENING LABORATORY TESTING FOR SEVERE ACUTE RESPIRATORY SYNDROME CORONAVIRUS 2 (SARS-COV-2): ICD-10-CM

## 2021-04-02 DIAGNOSIS — J18.1 UNRESOLVED LOBAR PNEUMONIA (H): ICD-10-CM

## 2021-04-02 LAB
ALBUMIN SERPL-MCNC: 3.6 G/DL (ref 3.4–5)
ALP SERPL-CCNC: 129 U/L (ref 40–150)
ALT SERPL W P-5'-P-CCNC: 27 U/L (ref 0–50)
ANION GAP SERPL CALCULATED.3IONS-SCNC: 6 MMOL/L (ref 3–14)
AST SERPL W P-5'-P-CCNC: 23 U/L (ref 0–45)
BASOPHILS # BLD AUTO: 0 10E9/L (ref 0–0.2)
BASOPHILS NFR BLD AUTO: 0.5 %
BILIRUB SERPL-MCNC: 0.3 MG/DL (ref 0.2–1.3)
BUN SERPL-MCNC: 13 MG/DL (ref 7–30)
CALCIUM SERPL-MCNC: 9.3 MG/DL (ref 8.5–10.1)
CHLORIDE SERPL-SCNC: 107 MMOL/L (ref 94–109)
CO2 SERPL-SCNC: 24 MMOL/L (ref 20–32)
CREAT SERPL-MCNC: 0.81 MG/DL (ref 0.52–1.04)
CRP SERPL-MCNC: 55 MG/L (ref 0–8)
D DIMER PPP FEU-MCNC: 0.5 UG/ML FEU (ref 0–0.5)
DIFFERENTIAL METHOD BLD: ABNORMAL
EOSINOPHIL # BLD AUTO: 0 10E9/L (ref 0–0.7)
EOSINOPHIL NFR BLD AUTO: 0.2 %
ERYTHROCYTE [DISTWIDTH] IN BLOOD BY AUTOMATED COUNT: 14.6 % (ref 10–15)
FLUAV RNA RESP QL NAA+PROBE: NEGATIVE
FLUBV RNA RESP QL NAA+PROBE: NEGATIVE
GFR SERPL CREATININE-BSD FRML MDRD: 85 ML/MIN/{1.73_M2}
GLUCOSE SERPL-MCNC: 102 MG/DL (ref 70–99)
GRAM STN SPEC: NORMAL
HCT VFR BLD AUTO: 41.5 % (ref 35–47)
HGB BLD-MCNC: 13.2 G/DL (ref 11.7–15.7)
IMM GRANULOCYTES # BLD: 0 10E9/L (ref 0–0.4)
IMM GRANULOCYTES NFR BLD: 0.3 %
LABORATORY COMMENT REPORT: NORMAL
LYMPHOCYTES # BLD AUTO: 3.8 10E9/L (ref 0.8–5.3)
LYMPHOCYTES NFR BLD AUTO: 43.8 %
Lab: NORMAL
MCH RBC QN AUTO: 22.7 PG (ref 26.5–33)
MCHC RBC AUTO-ENTMCNC: 31.8 G/DL (ref 31.5–36.5)
MCV RBC AUTO: 71 FL (ref 78–100)
MONOCYTES # BLD AUTO: 0.8 10E9/L (ref 0–1.3)
MONOCYTES NFR BLD AUTO: 9.6 %
NEUTROPHILS # BLD AUTO: 3.9 10E9/L (ref 1.6–8.3)
NEUTROPHILS NFR BLD AUTO: 45.6 %
NRBC # BLD AUTO: 0 10*3/UL
NRBC BLD AUTO-RTO: 0 /100
NT-PROBNP SERPL-MCNC: 79 PG/ML (ref 0–450)
PLATELET # BLD AUTO: 286 10E9/L (ref 150–450)
POTASSIUM SERPL-SCNC: 3.7 MMOL/L (ref 3.4–5.3)
PROT SERPL-MCNC: 8 G/DL (ref 6.8–8.8)
RBC # BLD AUTO: 5.81 10E12/L (ref 3.8–5.2)
RSV RNA SPEC QL NAA+PROBE: NORMAL
SARS-COV-2 RNA RESP QL NAA+PROBE: NEGATIVE
SODIUM SERPL-SCNC: 137 MMOL/L (ref 133–144)
SPECIMEN SOURCE: NORMAL
SPECIMEN SOURCE: NORMAL
TROPONIN I SERPL-MCNC: <0.015 UG/L (ref 0–0.04)
WBC # BLD AUTO: 8.6 10E9/L (ref 4–11)

## 2021-04-02 PROCEDURE — 99285 EMERGENCY DEPT VISIT HI MDM: CPT | Mod: 25 | Performed by: EMERGENCY MEDICINE

## 2021-04-02 PROCEDURE — 96375 TX/PRO/DX INJ NEW DRUG ADDON: CPT

## 2021-04-02 PROCEDURE — 87070 CULTURE OTHR SPECIMN AEROBIC: CPT | Performed by: EMERGENCY MEDICINE

## 2021-04-02 PROCEDURE — 84484 ASSAY OF TROPONIN QUANT: CPT | Performed by: EMERGENCY MEDICINE

## 2021-04-02 PROCEDURE — C9803 HOPD COVID-19 SPEC COLLECT: HCPCS

## 2021-04-02 PROCEDURE — 99285 EMERGENCY DEPT VISIT HI MDM: CPT | Mod: 25

## 2021-04-02 PROCEDURE — 87636 SARSCOV2 & INF A&B AMP PRB: CPT | Performed by: EMERGENCY MEDICINE

## 2021-04-02 PROCEDURE — 258N000003 HC RX IP 258 OP 636: Performed by: EMERGENCY MEDICINE

## 2021-04-02 PROCEDURE — 85379 FIBRIN DEGRADATION QUANT: CPT | Performed by: EMERGENCY MEDICINE

## 2021-04-02 PROCEDURE — 250N000011 HC RX IP 250 OP 636: Performed by: EMERGENCY MEDICINE

## 2021-04-02 PROCEDURE — 80053 COMPREHEN METABOLIC PANEL: CPT | Performed by: EMERGENCY MEDICINE

## 2021-04-02 PROCEDURE — 86140 C-REACTIVE PROTEIN: CPT | Performed by: EMERGENCY MEDICINE

## 2021-04-02 PROCEDURE — 96374 THER/PROPH/DIAG INJ IV PUSH: CPT

## 2021-04-02 PROCEDURE — 93005 ELECTROCARDIOGRAM TRACING: CPT

## 2021-04-02 PROCEDURE — 93010 ELECTROCARDIOGRAM REPORT: CPT | Performed by: EMERGENCY MEDICINE

## 2021-04-02 PROCEDURE — 87205 SMEAR GRAM STAIN: CPT | Performed by: EMERGENCY MEDICINE

## 2021-04-02 PROCEDURE — 250N000013 HC RX MED GY IP 250 OP 250 PS 637: Performed by: EMERGENCY MEDICINE

## 2021-04-02 PROCEDURE — 85025 COMPLETE CBC W/AUTO DIFF WBC: CPT | Performed by: EMERGENCY MEDICINE

## 2021-04-02 PROCEDURE — 71045 X-RAY EXAM CHEST 1 VIEW: CPT

## 2021-04-02 PROCEDURE — 83880 ASSAY OF NATRIURETIC PEPTIDE: CPT | Performed by: EMERGENCY MEDICINE

## 2021-04-02 PROCEDURE — 96361 HYDRATE IV INFUSION ADD-ON: CPT

## 2021-04-02 PROCEDURE — 94640 AIRWAY INHALATION TREATMENT: CPT

## 2021-04-02 RX ORDER — KETOROLAC TROMETHAMINE 30 MG/ML
30 INJECTION, SOLUTION INTRAMUSCULAR; INTRAVENOUS ONCE
Status: COMPLETED | OUTPATIENT
Start: 2021-04-02 | End: 2021-04-02

## 2021-04-02 RX ORDER — LEVOFLOXACIN 500 MG/1
500 TABLET, FILM COATED ORAL DAILY
Qty: 7 TABLET | Refills: 0 | Status: SHIPPED | OUTPATIENT
Start: 2021-04-02 | End: 2021-04-09

## 2021-04-02 RX ORDER — METHYLPREDNISOLONE SODIUM SUCCINATE 125 MG/2ML
125 INJECTION, POWDER, LYOPHILIZED, FOR SOLUTION INTRAMUSCULAR; INTRAVENOUS ONCE
Status: COMPLETED | OUTPATIENT
Start: 2021-04-02 | End: 2021-04-02

## 2021-04-02 RX ORDER — ALBUTEROL SULFATE 90 UG/1
2 AEROSOL, METERED RESPIRATORY (INHALATION) ONCE
Status: COMPLETED | OUTPATIENT
Start: 2021-04-02 | End: 2021-04-02

## 2021-04-02 RX ORDER — DIPHENHYDRAMINE HCL 50 MG
50 CAPSULE ORAL ONCE
Status: COMPLETED | OUTPATIENT
Start: 2021-04-02 | End: 2021-04-02

## 2021-04-02 RX ADMIN — METHYLPREDNISOLONE SODIUM SUCCINATE 125 MG: 125 INJECTION, POWDER, FOR SOLUTION INTRAMUSCULAR; INTRAVENOUS at 13:18

## 2021-04-02 RX ADMIN — DIPHENHYDRAMINE HYDROCHLORIDE 50 MG: 50 CAPSULE ORAL at 13:19

## 2021-04-02 RX ADMIN — KETOROLAC TROMETHAMINE 30 MG: 30 INJECTION, SOLUTION INTRAMUSCULAR at 13:18

## 2021-04-02 RX ADMIN — ALBUTEROL SULFATE 2 PUFF: 90 AEROSOL, METERED RESPIRATORY (INHALATION) at 12:34

## 2021-04-02 RX ADMIN — SODIUM CHLORIDE 1000 ML: 9 INJECTION, SOLUTION INTRAVENOUS at 13:38

## 2021-04-02 RX ADMIN — PROCHLORPERAZINE EDISYLATE 10 MG: 5 INJECTION INTRAMUSCULAR; INTRAVENOUS at 13:19

## 2021-04-02 ASSESSMENT — ENCOUNTER SYMPTOMS
FEVER: 1
DIFFICULTY URINATING: 0
NAUSEA: 1
MYALGIAS: 0
NECK STIFFNESS: 0
ABDOMINAL PAIN: 0
COLOR CHANGE: 0
VOMITING: 0
DIAPHORESIS: 1
SHORTNESS OF BREATH: 1
ARTHRALGIAS: 0
EYE REDNESS: 0
CHILLS: 1
FATIGUE: 1
CONFUSION: 0
HEADACHES: 1
COUGH: 1
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
## 2021-04-02 NOTE — ED TRIAGE NOTES
Pt states diag with URI two weeks ago but states her signs and symptoms have not improved and is now feeling constipated.

## 2021-04-04 LAB
BACTERIA SPEC CULT: NORMAL
INTERPRETATION ECG - MUSE: NORMAL
Lab: NORMAL
SPECIMEN SOURCE: NORMAL

## 2021-05-13 ENCOUNTER — TELEPHONE (OUTPATIENT)
Dept: FAMILY MEDICINE | Facility: CLINIC | Age: 51
End: 2021-05-13

## 2021-05-13 NOTE — TELEPHONE ENCOUNTER
Patient Quality Outreach Summary      Summary:    Patient is due/failing the following:   ACT needed and Asthma follow-up visit    Type of outreach:    Sent [facility] message.    Questions for provider review:    None                                                                                                                      Haleigh Hairston CMA       Chart routed to Care Team.

## 2021-05-14 ENCOUNTER — AMBULATORY - HEALTHEAST (OUTPATIENT)
Dept: BEHAVIORAL HEALTH | Facility: HOSPITAL | Age: 51
End: 2021-05-14

## 2021-05-18 ENCOUNTER — AMBULATORY - HEALTHEAST (OUTPATIENT)
Dept: BEHAVIORAL HEALTH | Facility: CLINIC | Age: 51
End: 2021-05-18

## 2021-05-27 ASSESSMENT — PATIENT HEALTH QUESTIONNAIRE - PHQ9
SUM OF ALL RESPONSES TO PHQ QUESTIONS 1-9: 17
SUM OF ALL RESPONSES TO PHQ QUESTIONS 1-9: 16
SUM OF ALL RESPONSES TO PHQ QUESTIONS 1-9: 17
SUM OF ALL RESPONSES TO PHQ QUESTIONS 1-9: 11
SUM OF ALL RESPONSES TO PHQ QUESTIONS 1-9: 11
SUM OF ALL RESPONSES TO PHQ QUESTIONS 1-9: 17

## 2021-05-28 ASSESSMENT — ANXIETY QUESTIONNAIRES
GAD7 TOTAL SCORE: 16
GAD7 TOTAL SCORE: 16
GAD7 TOTAL SCORE: 12
GAD7 TOTAL SCORE: 17
GAD7 TOTAL SCORE: 11

## 2021-06-04 VITALS
HEART RATE: 92 BPM | HEIGHT: 61 IN | DIASTOLIC BLOOD PRESSURE: 74 MMHG | SYSTOLIC BLOOD PRESSURE: 126 MMHG | WEIGHT: 201 LBS | BODY MASS INDEX: 37.95 KG/M2 | RESPIRATION RATE: 16 BRPM

## 2021-06-05 NOTE — PATIENT INSTRUCTIONS - HE
1. Follow up with primary regarding anxiety medications  2. Set up echo to follow up on aortic regurgitation

## 2021-06-14 NOTE — PROGRESS NOTES
"Mental Health Psychotherapy Telephone Note    Patient: Jessie Seymour    : 1970 MRN: [id number]    Completed a 2 point identification verification: patient verbalized full name and date of birth.     Date: 2021  Start time: 8:04  Stop Time:  8:56   Assessment  Session # 2    The patient has been notified of the following:   \"We have found that certain health care needs can be provided without the need for a face to face visit.  This service lets us provide the care you need with a phone conversation.  I will have full access to your [facility] medical record during this entire phone call.   I will be taking notes for your medical record. Since this is like an office visit, we will bill your insurance company for this service.  There are potential benefits and risks of telephone visits (e.g. limits to patient confidentiality) that differ from in-person visits.?  Confidentiality still applies for telephone services, and nobody will record the visit.  It is important to be in a quiet, private space that is free of distractions (including cell phone or other devices) during the visit.?? If during the course of the call I believe a telephone visit is not appropriate, you will not be charged for this service\"  Consent has been obtained for this service by care team member: Yes, per verbal agreement   Session Type: Patient is participating in a telemedicine phone visit: No device available for video    Those present for this session: Patient and therapist.     Chief Complaint   Patient presents with     Assessment session 2     Telephone visit for psychotherapy      New symptoms or complaints: Today was the assessment session 2. Patient completed the first assessment session 2 weeks ago. She was transferring from a different therapist. She is experiencing grief from her's father death. Her goal is to talk process feelings around this loss.   PHQ9: 17  GAD7 declined to complete    Functional Impairment: " "  Personal: 3  Family: 3  Work: 3  Social:3        ASSESSMENT: Current Emotional / Mental Status (status of significant symptoms):              Risk status (Self / Other harm or suicidal ideation)              Patient denies safety issue              Patient denies current or recent suicidal ideation or behaviors.              Patient denies current or recent homicidal ideation or behaviors.              Patient denies current or recent self injurious behavior or ideation.              Patient denies other safety concerns.              Patient denies change in risk factors               Patient denies change in protective factors              Recommended that patient call 911 or go to the local ED should there be a change in any of these risk factors.                Attitude:                                   Cooperative               Orientation:                             3X              Speech                          Rate / Production:       Normal/ Responsive                          Volume:                       Normal               Mood:                                     reports she was Irritable               Thought Content:                    Clear               Thought Form:                        Coherent  Logical               Insight:                                     Good     Patient's impression of their current status: Patient reports she was \"irritated\" today. Has had no good sleep due to some experience at work in combination with her loss. Finds some co-workers lazy and irresponsible. Has been waking up every couple of hours due to high anxiety and panic attacks. Much time was spent today processing these feelings. Patient does not wish to complete the required screening tools for each session. Patient notes this is a waste of her time and wants writer report this that she has refused. Patient chose to share her feelings about her work related issues. She also provided some information " around family/social history. Her next telephone visit is in a week it appears that one visit was done and the information was not enough to complete the DA. For this reason, patient had indicated that her DA was completed by the previous therapist before the transfer to this writer.    Therapist impression of patients current state: Today appeared difficult for the patient. .She was open to share her frustration which was related to her work experiences. Due to the report of negative feelings mainly irritability, it was clear that patient was not prepared to move forward before these feelings could be processed. Just at the first encounter, Writer provided the explanation about the reason the PHQ 9 and BON 7 are needed at each psychotherapy visit. However, the explanation did not convince the patient. Writer oriented her on how to address the issue. She rather preferred thatt the writer be the one to report her refusal to complete the tool.     Type of psychotherapeutic technique provided: Client centered and Rapport building    Progress toward short term goals: Made it phone for her second assessment session    Review of long term goals: Not done at today's visit. This is an assessment phase.     Diagnosis:  1. Grief reaction      Plan and Follow up:   Patient's next telephone visit is in a week    Discharge Criteria/Planning: Patient will continue with follow-up until therapy can be discontinued without return of signs and symptoms.    I have reviewed the note as documented above.  This accurately captures the substance of my conversation with the patient.  As the provider I attest to compliance with applicable laws and regulations related to telemedicine.  Performed and documented by DUANE Hook- JOSE; [facility] 1/18/2021
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
## 2021-06-14 NOTE — PROGRESS NOTES
"Mental Health Psychotherapy Telephone Note    Patient: Jessie Seymour    : 1970 MRN: [id number]    Completed a 2 point identification verification: patient verbalized full name and date of birth.     Date: 2021  Start time: 8:05   Stop Time: 8:56  Assessment  Session #  1    The patient has been notified of the following:   \"We have found that certain health care needs can be provided without the need for a face to face visit.  This service lets us provide the care you need with a phone conversation.  I will have full access to your [facility] medical record during this entire phone call.   I will be taking notes for your medical record. Since this is like an office visit, we will bill your insurance company for this service.  There are potential benefits and risks of telephone visits (e.g. limits to patient confidentiality) that differ from in-person visits.?  Confidentiality still applies for telephone services, and nobody will record the visit.  It is important to be in a quiet, private space that is free of distractions (including cell phone or other devices) during the visit.?? If during the course of the call I believe a telephone visit is not appropriate, you will not be charged for this service\"  Consent has been obtained for this service by care team member: Yes, per verbal agreement   Session Type: Patient is participating in a telemedicine phone visit: No new device for video    Those present for this session: Patient and therapist    Chief Complaint   Patient presents with     Assessment session 1     Telephone visit for psychotherapy       New symptoms or complaints:Patient presented on time to this mental health clinic having been referred to this provider by Roshan Pelayo MD  with [facility] for a psychotherapy. A diagnostic assessment was initiated with patient cooperation.  AIDET was performed. Patient and writer completed the necessary paper work for today's " visit. These include PHQ 9; BON 7, CAGE AID, C-SSRS, and WHODAS 2.0.   Patient  was scheduled for a second diagnostic assessment encounter on 1/12/2021.    Functional Impairment:   Personal: 3  Family: 3  Work: 3  Social:3        ASSESSMENT: Current Emotional / Mental Status (status of significant symptoms):              Risk status (Self / Other harm or suicidal ideation)              Patient denies safety issues              Patient denies current or recent suicidal ideation or behaviors.              Patient denies current or recent homicidal ideation or behaviors.              Patient denies current or recent self injurious behavior or ideation.              Patient denies other safety concerns.              Patient denies change in risk factors              Patient denies change in protective factors              Recommended that patient call 911 or go to the local ED should there be a change in any of these risk factors.                Attitude:                                   Cooperative               Orientation:                             3X              Speech                          Rate / Production:       Normal/ Responsive                          Volume:                       Normal               Mood:                                      Anxious  Depressed  Sad               Thought Content:                    Clear               Thought Form:                        Coherent  Logical               Insight:                                     Good     Patient's impression of their current status: Patient presented with a history of  MDD and BON. She also has been deeply by the loss of the father of her 14 year old son. He passed away over 1 year ago and she has been grieving since then, on daily basis. She finds this has increased her anxiety and depression. She wants to be thomas to process her feelings and know how to grief properly.  Patient completed the first part of her assessment. She will  return in a week. Will be sent information on on how to access visits via [employer].     Therapist impression of patients current state: Writer spent sometime introducing herself, building a working relationship. Patient is experiencing high level of grief which seems to increase her depression and anxiety. She will benefit from therapy to process her loss and to cope with these issues. She appeared determined to follow through.     Type of psychotherapeutic technique provided: Insight oriented, Client centered and Rapport building, assessment    Progress toward short term goals: Unable to assess the progress at this very first encounter    Review of long term goals: Not done at today's visit    Diagnosis:  1. Grief reaction      Plan and Follow up:   Patient will return in a week for her assessment session 2    Discharge Criteria/Planning: Patient will continue with follow-up until therapy can be discontinued without return of signs and symptoms.    I have reviewed the note as documented above.  This accurately captures the substance of my conversation with the patient.  As the provider I attest to compliance with applicable laws and regulations related to telemedicine.  Performed and documented by DUANE Hook- JOSE; [facility] 1/5/2021

## 2021-06-14 NOTE — PROGRESS NOTES
"Mental Health Psychotherapy Telephone Note    Patient: Jessie Seymour    : 1970 MRN: [id number]    Completed a 2 point identification verification: patient verbalized full name and date of birth.     Date: 2021   Start time:  15:04  Stop Time: 15:50    Therapy Session #  1    The patient has been notified of the following:   \"We have found that certain health care needs can be provided without the need for a face to face visit.  This service lets us provide the care you need with a phone conversation.  I will have full access to your [place] medical record during this entire phone call.   I will be taking notes for your medical record. Since this is like an office visit, we will bill your insurance company for this service.  There are potential benefits and risks of telephone visits (e.g. limits to patient confidentiality) that differ from in-person visits.?  Confidentiality still applies for telephone services, and nobody will record the visit.  It is important to be in a quiet, private space that is free of distractions (including cell phone or other devices) during the visit.?? If during the course of the call I believe a telephone visit is not appropriate, you will not be charged for this service\"  Consent has been obtained for this service by care team member: Yes, per verbal agreement   Session Type: Patient is participating in a telemedicine phone visit: no device available for video    Those present for this session: Patient and therapist    Chief Complaint   Patient presents with     MH Follow Up     Telephone visit for psychotherapy     MH Treatment Plan     New symptoms or complaints: \" lots going on that I will be processing in future sessions\"    PHQ9: 17; BON 7: 16; WHODAS 2.0: 25 %; CAGE AID: 0/4; C-SSRS: 0    Functional Impairment:   Personal: 3  Family: 3  Work: 3  Social:3        ASSESSMENT: Current Emotional / Mental Status (status of significant symptoms):              Risk status " (Self / Other harm or suicidal ideation)              Patient denied safety issues              Patient denies current or recent suicidal ideation or behaviors.              Patient denies current or recent homicidal ideation or behaviors.              Patient denies current or recent self injurious behavior or ideation.              Patient denies other safety concerns.              Patient denies change in risk factors              Patient denies change in protective factors              Recommended that patient call 911 or go to the local ED should there be a change in any of these risk factors.                Attitude:                                   Cooperative               Orientation:                             3X              Speech                          Rate / Production:       Normal/ Responsive                          Volume:                       Normal               Mood:                                      Anxious               Thought Content:                    Clear               Thought Form:                        Coherent  Logical               Insight:                                     Good     Patient's impression of their current status: Today was the first therapy visit following the DA. Patient had opportunity to ask questions/clarifications about her assessment results. ble Patient indicated she has much going on and agreed she will prioritize her needs to be process them in sessions and feel better. Patient opted for a biweekly visit.     Therapist impression of patients current state: Writer assessed patient's safety. Presented the results from the assessment. Provided details about the initial treatment plan which will target grief, depression and anxiety. Patient will do well with different modalities including CBT, Grief processing, MI, and person centered.     Type of psychotherapeutic technique provided: Insight oriented, Client centered, CBT and grief processing, and  rapport processing.     Progress toward short term goals: Feeling the same since last visit. though hopeful    Review of long term goals: Initial Treatment plan: 2/01/2021 next review: 5/03/2021    Diagnosis:  1. Grief reaction    2. Moderate episode of recurrent major depressive disorder (H)    3. Generalized anxiety disorder      Plan and Follow up:   Patient will review her initial treatment plan by the next visit.   Patient will prioritize her needs in therapy sessions by the next visit  Patient will return in a week for her next visit.     Discharge Criteria/Planning: Patient will continue with follow-up until therapy can be discontinued without return of signs and symptoms.     I have reviewed the note as documented above.  This accurately captures the substance of my conversation with the patient.  As the provider I attest to compliance with applicable laws and regulations related to telemedicine.  .Performed and documented by DUANE Hook- JOSE; [facility] 2/1/2021

## 2021-06-14 NOTE — PROGRESS NOTES
Outpatient Mental Health Treatment Plan    Name:  Jessie Seymour  :  1970  MRN:  [id number]    Treatment Plan:  Initial Treatment Plan  Intake/initial treatment plan date:  2021  Benefit and risks and alternatives have been discussed: Yes  Is this treatment appropriate with minimal intrusion/restrictions: Yes  Estimated duration of treatment:  12 +  Anticipated frequency of services:  Every 2 weeks  Necessity for frequency: This frequency is needed to establish therapeutic goals and for continuity of care in order to monitor progress.  Necessity for treatment: To address cognitive, behavioral, and/or emotional barriers in order to work toward goals and to improve quality of life.    Session Type: Patient is presenting for an Individual session.    Plan:      ? Depression    Goal:  Decrease average depression level from 4 to 3.   Strategies:    ?[x] Decrease social isolation     [x] Increase involvement in meaningful activities     ?[x] Discuss sleep hygiene     ?[x] Explore thoughts and expectations about self and others     ?[x] Process grief (loss of significant person, independence, role, etc.)     ?[x] Assess for suicide risk     ?[x] Implement physical activity routine (with physician approval)     [] Consider introduction of bibliotherapy and/or videos     [x] Continue compliance with medical treatment plan (or explore  barriers)    ?  ?Degree to which this is a problem: 4  Degree to which goal is met: 1  Date of Next Review: 2021        ?   ? Anxiety    Goal:  Decrease average anxiety level from 4 to 3.   Strategies: ? [x]Learn and practice relaxation techniques and other coping strategies (e.g., thought stopping, reframing, meditation)     ? [x] Increase involvement in meaningful activities     ? [x] Discuss sleep hygiene     ? [x] Explore thoughts and expectations about self and others     ? [x] Identify and monitor triggers for panic/anxiety symptoms     ? [x] Implement physical  activity routine (with physician approval)     ? [] Consider introduction of bibliotherapy and/or videos     ? [x] Continue compliance with medical treatment plan (or explore barriers)                                      Degree to which this is a problem: 4  Degree to which goal is met: 1  Date of next Review: 5/03/2021    Grief and Loss  Goal: Accept the loss of beloved ones and return to stable level of functioning as evidenced by acceptance     Strategies:    ? [x]  Express unresolved emotions regarding losses  ? [x]  Display an understanding of the grief process and how this process may be exacerbated by or may exacerbate mental illness symptoms   ?[x]  Develop an understanding of how avoidance of the grief process may affect functioning in all areas   ?[x]  Develop alternative diversions and other coping mechanisms that are related to loss issues   ?[x]  Resolve spiritual conflict   ?[x]  Redevelop a supportive social system and improve interpersonal relationships      Degree to which this is a problem (1-4): 4    Degree to which goal is met (1-4):1    Date of next Review: 5/03/2021       Functional Impairment:  1=Not at all/Rarely  2=Some days  3=Most Days  4=Every Day    Personal : 3  Family : 3  Social : 3   Work/school : 3    Diagnoses:  1.Grief reaction   2.Major Depressive Disorder, Recurrent Episode, Moderate With mixed features    3. Generalized     WHODAS 2.0 12-item version: 12 or 25 %    Scores presented in qualifiers to represent level of disability.  MODERATE Problem (medium, fair, ...) 25-49%    H1= 0  H2= 0  H3= 0    Clinical assessments and measures completed:PHQ9: 17; BON 7: 16; WHODAS 2.0: 25 %; CAGE AID: 0/4; C-SSRS: 0    Strengths: Commitment to health and well being, exercise routing, family support, insight, intelligence, positive work environment, motivation, strong social skills and work ethic.     Limitations:Work environment, social issues.     Cultural Considerations:   American, family oriented, Navigates the system on her own. Uses [employer].     Persons responsible for this plan: Patient and Provider    Psychotherapist Signature           Patient Signature:              Guardian Signature             Provider: Performed and documented by NANCY Hook; [facility] 2/1/2021  Date:  2/1/2021

## 2021-06-14 NOTE — PROGRESS NOTES
Writer called the patient to follow up on her last missed appt.  Patient stated she has been sick. She is doing much better now and plans to attend her next visit in week.

## 2021-06-14 NOTE — PROGRESS NOTES
Patient was no show for her scheduled appt. Writer left a voice mail to let her know that it was her meeting time. Writer left a second voice mail wit the BA team number to [email or web address] and ask to be added to this PM schedule should she have a time.

## 2021-06-15 NOTE — PROGRESS NOTES
Writer left a VM in the patient's phone checking whether she was available to complete her visit. Patient had indicated she was getting her  but the phone stopped. Patient was sent the screening tools via my chart to complete.

## 2021-06-15 NOTE — PROGRESS NOTES
"Mental Health Psychotherapy Telephone Note    Patient: Jessie Seymour    : 1970 MRN: [id number]    Completed a 2 point identification verification: patient verbalized full name and date of birth.     Date: 3/02/2021  Start time: 8:04  Stop Time: 8:37  Session # 3    The patient has been notified of the following:   \"We have found that certain health care needs can be provided without the need for a face to face visit.  This service lets us provide the care you need with a phone conversation.  I will have full access to your [place] medical record during this entire phone call.   I will be taking notes for your medical record. Since this is like an office visit, we will bill your insurance company for this service.  There are potential benefits and risks of telephone visits (e.g. limits to patient confidentiality) that differ from in-person visits.?  Confidentiality still applies for telephone services, and nobody will record the visit.  It is important to be in a quiet, private space that is free of distractions (including cell phone or other devices) during the visit.?? If during the course of the call I believe a telephone visit is not appropriate, you will not be charged for this service\"  Consent has been obtained for this service by care team member: Yes, per verbal agreement   Session Type: Patient is participating in a telemedicine phone visit: No device available for video    Those present for this session: Patient and therapist    Chief Complaint   Patient presents with     MH Follow Up     Telephone visit for psychotherapy      New symptoms or complaints:\" I am doing just fine, only tired because I have been working a lot\"  PHQ9: Declined to complete  GAD7: Declined to complete    Functional Impairment:   Personal: 3  Family: 3  Work: 3  Social:3        ASSESSMENT: Current Emotional / Mental Status (status of significant symptoms):              Risk status (Self / Other harm or suicidal " ideation)              Patient denies safety issues              Patient denies current or recent suicidal ideation or behaviors.              Patient denies current or recent homicidal ideation or behaviors.              Patient denies current or recent self injurious behavior or ideation.              Patient denies other safety concerns.              Patient denies change in risk factors              Patient denies change in protective factors              Recommended that patient call 911 or go to the local ED should there be a change in any of these risk factors.                Attitude:                                   Cooperative               Orientation:                             3X              Speech                          Rate / Production:       Normal/ Responsive                          Volume:                       Normal               Mood:                                      Normal              Thought Content:                    Clear               Thought Form:                        Coherent  Logical               Insight:                                     Good     Patient's impression of their current status: Patient reports having been busy with work. Has missed her last appt because she had to work. Reports no new concern except the exhaustion from working many hours lately. Has been trying to keep up with her self care as well with rest, healthier diet. She is willing to try some mindfulness regularly to keep her thoughts together. Patient's next telephone visit is in 2 weeks.     Therapist impression of patients current state: Writer assessed patient's safety. Reinforced her dedication to well being of herself and others. Encouraged her to keep up with regular mindfulness to help bring her thought together and feel better.     Type of psychotherapeutic technique provided: Insight oriented, Client centered, Solution-focused and CBT    Progress toward short term goals: Progress as  expected, reports no new concern today    Progress as expected, Pt discussing concerns and coping strategies during tele-sessions. Pt working on homework assignments and skills learned in therapy between sessions    Review of long term goals: Initial treatment plan:2/01/2021 next review: 5/03/2021     Diagnosis:  1. Grief reaction    2. Moderate episode of recurrent major depressive disorder (H)    3. Generalized anxiety disorder      Plan and Follow up:   Patient will practice mindfulness daily using her 5 senses  Patient's next visit is in 2 weeks    Discharge Criteria/Planning: Patient will continue with follow-up until therapy can be discontinued without return of signs and symptoms.    I have reviewed the note as documented above.  This accurately captures the substance of my conversation with the patient.  As the provider I attest to compliance with applicable laws and regulations related to telemedicine.  Performed and documented by DUANE Hook- JOSE; [facility] 3/2/2021

## 2021-06-15 NOTE — PROGRESS NOTES
"Mental Health Psychotherapy Telephone Note    Patient: Jessie Seymour    : 1970 MRN: [id number]    Completed a 2 point identification verification: patient verbalized full name and date of birth.     Date: 2021  Start time: 8:04  Stop Time: 8:50  Therapy Session # 2    The patient has been notified of the following:   \"We have found that certain health care needs can be provided without the need for a face to face visit.  This service lets us provide the care you need with a phone conversation.  I will have full access to your [place] medical record during this entire phone call.   I will be taking notes for your medical record. Since this is like an office visit, we will bill your insurance company for this service.  There are potential benefits and risks of telephone visits (e.g. limits to patient confidentiality) that differ from in-person visits.?  Confidentiality still applies for telephone services, and nobody will record the visit.  It is important to be in a quiet, private space that is free of distractions (including cell phone or other devices) during the visit.?? If during the course of the call I believe a telephone visit is not appropriate, you will not be charged for this service\"  Consent has been obtained for this service by care team member: Yes, per verbal agreement   Session Type: Patient is participating in a telemedicine phone visit: No device for video    Those present for this session: Patient and therapist    Chief Complaint   Patient presents with     MH Follow Up     Telephone visit for psychotherapy     New symptoms or complaints: Grief  PHQ9;11  GAD7: 11    Functional Impairment:   Personal: 3  Family: 3  Work: 3  Social:3        ASSESSMENT: Current Emotional / Mental Status (status of significant symptoms):              Risk status (Self / Other harm or suicidal ideation)              Patient denies safety issues              Patient denies current or recent suicidal " ideation or behaviors.              Patient denies current or recent homicidal ideation or behaviors.              Patient denies current or recent self injurious behavior or ideation.              Patient denies other safety concerns.              Patient denies change in risk factors              Patient denies change in protective factors              Recommended that patient call 911 or go to the local ED should there be a change in any of these risk factors.                Attitude:                                   Cooperative               Orientation:                             3X              Speech                          Rate / Production:       Normal/ Responsive                          Volume:                       Normal               Mood:                                      Anxious  Depressed  Sad               Thought Content:                    Clear               Thought Form:                        Coherent  Logical               Insight:                                     Good     Patient's impression of their current status: Patient reports she has been doing fairly well. Though shared also some troubling feelings related to her loss. She notes it is overwhelming to process some of the past experiences. Though feels she is ready to try so she can move on in life. Her next visit is in a week.     Therapist impression of patients current state: Writer assessed for safety. Processed patient's feelings. Invited her to share her feelings as she finds appropriate. Patient under went many challenges in life that include the loss of her son's father. There are many steps needed to be able to make peace in life. She is determined to do during the sessions.     Type of psychotherapeutic technique provided: Insight oriented, Client centered, Solution-focused and CBT,Grief processing.     Progress toward short term goals: Progress as expected, Pt discussing concerns and coping strategies during  tele-sessions. Pt working on homework assignments and skills learned in therapy between sessions    Review of long term goals: Initial treatment plan:2/01/2021 next review: 5/03/2021     Diagnosis:  1. Grief reaction    2. Moderate episode of recurrent major depressive disorder (H)      Plan and Follow up:  Patient will continue taking her medications as prescribed by her MD/NP  Patient will review the coping skills sent via my chart and practice them as needed.   Patient's next visit is in a week.     Discharge Criteria/Planning: Patient will continue with follow-up until therapy can be discontinued without return of signs and symptoms.    I have reviewed the note as documented above.  This accurately captures the substance of my conversation with the patient.  As the provider I attest to compliance with applicable laws and regulations related to telemedicine.  Performed and documented by DUANE Hook- JOSE; [facility] 2/8/2021

## 2021-06-15 NOTE — PROGRESS NOTES
"Mental Health Psychotherapy Telephone Note    Patient: Jessie Seymour    : 1970 MRN: [id number]    Completed a 2 point identification verification: patient verbalized full name and date of birth.     Date: 3/15/2021  Start time: 8:02  Stop Time: 8:54   Session # 4    The patient has been notified of the following:   \"We have found that certain health care needs can be provided without the need for a face to face visit.  This service lets us provide the care you need with a phone conversation.  I will have full access to your [facility] medical record during this entire phone call.   I will be taking notes for your medical record. Since this is like an office visit, we will bill your insurance company for this service.  There are potential benefits and risks of telephone visits (e.g. limits to patient confidentiality) that differ from in-person visits.?  Confidentiality still applies for telephone services, and nobody will record the visit.  It is important to be in a quiet, private space that is free of distractions (including cell phone or other devices) during the visit.?? If during the course of the call I believe a telephone visit is not appropriate, you will not be charged for this service\"  Consent has been obtained for this service by care team member: Yes, per verbal agreement   Session Type: Patient is participating in a telemedicine phone visit: No device for Video    Those present for this session: Patient and therapist    Chief Complaint   Patient presents with     MH Follow Up     Video visit for psychotherapy     New symptoms or complaints: \" the day did not start well. Too much going on with job\"    Functional Impairment:   Personal: 3  Family: 3  Work: 3  Social:3    ASSESSMENT: Current Emotional / Mental Status (status of significant symptoms):              Risk status (Self / Other harm or suicidal ideation)              Patient denies safety issues              Patient denies current or " recent suicidal ideation or behaviors.              Patient denies current or recent homicidal ideation or behaviors.              Patient denies current or recent self injurious behavior or ideation.              Patient denies other safety concerns.              Patient denies change in risk factors              Patient denies change in protective factors              Recommended that patient call 911 or go to the local ED should there be a change in any of these risk factors.                Attitude:                                   Cooperative               Orientation:                             3X              Speech                          Rate / Production:       Normal/ Responsive                          Volume:                       Normal               Mood:                                      Anxious  Depressed               Thought Content:                    Clear               Thought Form:                        Coherent  Logical               Insight:                                     Good     Patient's impression of their current status: Patient shared frustration around job opportunity being an big issue this time for her.Patient agreed to process thoughts and feelings around this issue during the session. Patient agreed to focus on self, well being to able to address these issue to appropriate team.     Therapist impression of patients current state: Writer assessed patient's safety. Processed her thoughts and feelings related to job opportunity. Encouraged patient to keep challenging some of the negative feelings and grief. Patient was encouraged to practice some mindfulness to keep her thoughts together.     Type of psychotherapeutic technique provided: Insight oriented, Client centered, Solution-focused and CBT    Progress toward short term goals: Reports some stress related to work. Continues to grief.    Progress as expected, Pt discussing concerns and coping strategies during  tele-sessions. Pt working on homework assignments and skills learned in therapy between sessions    Review of long term goals: Initial Treatment Plan:2/01/2021 next review: 5/03/2021     Diagnosis:  1. Grief reaction    2. Moderate episode of recurrent major depressive disorder (H)      Plan and Follow up:   Patient will review the CBT skills sent via my chart and practice them.  Patient will reflect on today's session and challenge any ANTs as they present. Patient's next visit is in 2 weeks.     Discharge Criteria/Planning: Patient will continue with follow-up until therapy can be discontinued without return of signs and symptoms.    I have reviewed the note as documented above.  This accurately captures the substance of my conversation with the patient.  As the provider I attest to compliance with applicable laws and regulations related to telemedicine.  Performed and documented by DUANE Hook- JOSE; [facility] 3/15/2021

## 2021-06-15 NOTE — PROGRESS NOTES
Patient was no show. Writer left a confidential message with the BA number to call for any question .

## 2021-06-16 NOTE — PROGRESS NOTES
"Mental Health Psychotherapy Telephone Note    Patient: Jessie Seymour    : 1970 MRN: [id number]    Completed a 2 point identification verification: patient verbalized full name and date of birth.     Date: 3/29/2021  Start time: 8:03  Stop Time: 8:38  Session # 5    The patient has been notified of the following:   \"We have found that certain health care needs can be provided without the need for a face to face visit.  This service lets us provide the care you need with a phone conversation.  I will have full access to your [facility] medical record during this entire phone call.   I will be taking notes for your medical record. Since this is like an office visit, we will bill your insurance company for this service.  There are potential benefits and risks of telephone visits (e.g. limits to patient confidentiality) that differ from in-person visits.?  Confidentiality still applies for telephone services, and nobody will record the visit.  It is important to be in a quiet, private space that is free of distractions (including cell phone or other devices) during the visit.?? If during the course of the call I believe a telephone visit is not appropriate, you will not be charged for this service\"  Consent has been obtained for this service by care team member: Yes, per verbal agreement   Session Type: Patient is participating in a telemedicine phone visit: No device available for video    Those present for this session: Patient and therapist      Chief Complaint   Patient presents with     MH Follow Up     Telephone visit for psychotherapy      New symptoms or complaints: more losses    PHQ9:DECLINED TO COMPLETE  GAD7: DECLINED TO COMPLETE    Functional Impairment:   Personal: 4  Family: 4  Work: 3  Social:3        ASSESSMENT: Current Emotional / Mental Status (status of significant symptoms):              Risk status (Self / Other harm or suicidal ideation)              Patient denies safety issues            "   Patient denies current or recent suicidal ideation or behaviors.              Patient denies current or recent homicidal ideation or behaviors.              Patient denies current or recent self injurious behavior or ideation.              Patient denies other safety concerns.              Patient denies change in risk factors              Patient denies change in protective factors              Recommended that patient call 911 or go to the local ED should there be a change in any of these risk factors.                Attitude:                                   Cooperative               Orientation:                             3X              Speech                          Rate / Production:       Normal/ Responsive                          Volume:                       Normal               Mood:                                      Sad               Thought Content:                    Clear               Thought Form:                        Coherent  Logical               Insight:                                     Good     Patient's impression of their current status: Patient let this writer know that she does not think it is working. When asked what was going on, patient stated she does not want to be asked questions( PHQ9, BON 7) ; that she does not think therapist is a good fit for her. When asked how she was doing today and how writer can help her find a good fit, patient stated she just lost 2 family members and the 3rd one was dying. Patient could not tell what was making her feel therapy was not working. She could not decide whether she needed some help like being oriented to a different provider as long as she provides the search criteria. Patient declined to be given resources for outside support groups or individuals like [facility] or [facility] support.  Patient's beloved ones were in [place], [place], and [facility]. Patient shared sadness and how it is going to be difficult for her to care for  her 4 younger siblings. However, she does not think therapy is helping for now. She wants to take a break for a month. She indicated she has a strong family and friends support. She denied any SI/HI.    Therapist impression of patients current state:  Writer called the patient for her telephone visit.  It was noted that patent was prepared to share that therapy was not working. As writer tried to communicate with her, patient was then able to share the troubling news about her losses.  Writer processed patient's feelings. Made attempts to provide input on how therapy would help her deal with her current pain versus taking a break from it. Also acknowledged patient's determination about her care. Assessed for safety and let the patient choose what she sees can help with her grief and depression. It was noted that patient chose to take a break from therapy. Writer offered to look for a different provider within [facility] or to connect with other services like [facility] and some group support via [facility] grief support. Writer supported that patient's determination and her choice to take a break the May. .     Type of psychotherapeutic technique provided: Insight oriented, Client centered, Solution-focused, CBT and Grief processing    Progress toward short term goals: Increased sadness    Progress as expected, Pt discussing concerns and coping strategies during tele-sessions. Pt working on homework assignments and skills learned in therapy between sessions    Review of long term goals: Treatment plan updated: Initial Treatment Plan:2/01/2021 next review: 5/03/2021    Diagnosis:  1. Grief reaction    2. Moderate episode of recurrent major depressive disorder (H)    3. Generalized anxiety disorder      Plan and Follow up:   Patient indicated she wants to take a break from therapy session for a month. The plan is: The patient will return in May.     Discharge Criteria/Planning: Patient will continue with  follow-up until therapy can be discontinued without return of signs and symptoms.    I have reviewed the note as documented above.  This accurately captures the substance of my conversation with the patient.  As the provider I attest to compliance with applicable laws and regulations related to telemedicine.  Performed and documented by DUANE Hook- [facility]; [facility] 3/29/2021

## 2021-06-17 NOTE — PROGRESS NOTES
Discharge Summary    Dates of Service: 11/05/2020  to 3/29/2021 # of Therapy sessions completed: 5    Diagnosis at Intake:  1.Grief reaction   2.Major Depressive Disorder, Recurrent Episode, Moderate With mixed features    3. Generalized Anxiety disorder       Diagnosis at Discharge:  1.Grief reaction   2.Major Depressive Disorder, Recurrent Episode, Moderate With mixed features    3. Generalized Anxiety disorder    Progress toward goal 1:Anxiety                        Goal:    Decrease average anxiety level from 4 to 3. An updated treatment plan was due on 5/03. Patient's was last seen on 3/29.   unmet, partially met, met: Partially met. Patient seeing a different provider    Progress toward goal 2: Depression                      Goal:    Decrease average anxiety level from 4 to 3. An updated treatment plan was due on 5/03. Patient's was last seen for therapy session  on 3/29. unmet, partially met, met: Partially met. Patient seeing a different provider    Progress toward goal 3.Grief and Loss  Goal: Accept the loss of beloved ones and return to stable level of functioning as evidenced by acceptance  Progress toward goal 2: unmet, partially met, met: Partially met.  An updated treatment plan was due on 5/03. Patient's was last seen for therapy session  on 3/29.  Patient seeing a different provider    Additional comments: None    Reason for discharge: Patient dropped out   Goals partially met      Goals met .  Partially met. Patient is working with a different provider.    Prognosis for discharge: poor     Guarded     Good: Guarded- she will continue working with a different provider to achieve a full recover.    Recommendations and referrals at discharge: Patient decided to continue working with a different provider in the community. Patient can call to restart therapy should she wish to.  A new process with then be initiated.     Performed and documented by DUANE Hook- LICSW; [facility]  5/18/2021    _________________________________________________      Date_____________  Psychotherapist

## 2021-06-17 NOTE — PROGRESS NOTES
"Writer called the patient to discuss the plan to move on with/out this writer. Patient indicated she actually has been seeing another therapist in the community and her name is Johanna. She would like to \" stick' with Johanna for now. Patient agreed that writer can go ahead and terminate. Writer wished patient good luck as she is also dealing with some other health issues.   "

## 2021-06-17 NOTE — PROGRESS NOTES
Writer called the patient to check where she is at with her decision to close or resume therapy. Patient stated she was not doing well due to muscle spasms and wished to be be called later to discuss this. The plan is to call her next Monday.

## 2021-06-22 NOTE — PATIENT INSTRUCTIONS
Patient identified.  Erika Singh is a 68 year old female presenting with   Chief Complaint   Patient presents with   • Follow-up     bilateral heel pain 2 weeks   • Office Visit        PAIN: How much pain have you had because of your arthritis/disease in the past week?    NO PAIN [] [] [x] [] [] [] [] [] [] [] [] SEVERE PAIN    0 1 2 3 4 5 6 7 8 9 10      DISEASE ACTIVITY:  Considering all the ways your arthritis/disease affects you?  VERY WELL [] [] [x] [] [] [] [] [] [] [] [] VERY POORLY    0 1 2 3 4 5 6 7 8 9 10      FATIGUE:  How much of a problem has unusual fatigue or tiredness been for you in the past week?    NO PROBLEM [] [x] [] [] [] [] [] [] [] [] [] MAJOR PROBLEM    0 1 2 3 4 5 6 7 8 9 10      Are you currently pregnant?  No  Is there any possibility that you could be pregnant?  No  Are you planning on becoming pregnant within the next year? No     Visit Vitals  Temp 98.2 °F (36.8 °C) (Temporal)   Ht 5' (1.524 m)   Wt 61.4 kg   LMP  (LMP Unknown)   BMI 26.44 kg/m²         Medications have been requested to be refilled and have been ordered and pended for clinician's signature    Denies known Latex allergy or symptoms of Latex sensitivity.  Tobacco use verified.    Health Maintenance Due   Topic Date Due   • Shingles Vaccine (2 of 3) 12/22/2014       Patient would like communication of their results via:   [employer] Phone: [phone] (home)  Okay to leave a message containing results? Yes       UROLOGY CLINIC VISIT PATIENT INSTRUCTIONS    Physical Therapy Referral    Please call [phone] to make an appointment     [facility] - [email or web address]  [facility] - [email or web address]    Locations:    [facility] - [facility] [place]   [facility] - [facility] [place]       Follow up if your symptoms do not improve to discuss additional treatment options.     If you have any issues, questions or concerns in the meantime, do not hesitate to contact us at [phone] or via [employer].     It was a pleasure meeting with you today.  Thank you for allowing me and my team the privilege of caring for you today.  YOU are the reason we are here, and I truly hope we provided you with the excellent service you deserve.  Please let us know if there is anything else we can do for you so that we can be sure you are leaving completely satisfied with your care experience.
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
## 2021-07-06 ENCOUNTER — TELEPHONE (OUTPATIENT)
Dept: FAMILY MEDICINE | Facility: CLINIC | Age: 51
End: 2021-07-06

## 2021-07-06 NOTE — TELEPHONE ENCOUNTER
Patient Quality Outreach Summary      Summary:    Patient is due/failing the following:   Physical with fasting labs     Type of outreach:    Phone, spoke to patient/parent. patient scheduled     Questions for provider review:    None                                                                                                                    Haleigh Hairston CMA       Chart routed to Care Team.

## 2021-07-07 ENCOUNTER — OFFICE VISIT (OUTPATIENT)
Dept: FAMILY MEDICINE | Facility: CLINIC | Age: 51
End: 2021-07-07
Payer: COMMERCIAL

## 2021-07-07 VITALS
RESPIRATION RATE: 16 BRPM | BODY MASS INDEX: 43.48 KG/M2 | HEART RATE: 83 BPM | OXYGEN SATURATION: 100 % | SYSTOLIC BLOOD PRESSURE: 132 MMHG | DIASTOLIC BLOOD PRESSURE: 84 MMHG | TEMPERATURE: 98.4 F | HEIGHT: 59 IN | WEIGHT: 215.7 LBS

## 2021-07-07 DIAGNOSIS — Z13.0 SCREENING FOR ENDOCRINE, METABOLIC AND IMMUNITY DISORDER: ICD-10-CM

## 2021-07-07 DIAGNOSIS — J30.2 SEASONAL ALLERGIC RHINITIS, UNSPECIFIED TRIGGER: ICD-10-CM

## 2021-07-07 DIAGNOSIS — L98.9 SKIN LESION: ICD-10-CM

## 2021-07-07 DIAGNOSIS — D50.9 IRON DEFICIENCY ANEMIA, UNSPECIFIED IRON DEFICIENCY ANEMIA TYPE: ICD-10-CM

## 2021-07-07 DIAGNOSIS — J45.20 INTERMITTENT ASTHMA, UNCOMPLICATED: ICD-10-CM

## 2021-07-07 DIAGNOSIS — Z13.228 SCREENING FOR ENDOCRINE, METABOLIC AND IMMUNITY DISORDER: ICD-10-CM

## 2021-07-07 DIAGNOSIS — Z71.89 ADVANCE CARE PLANNING: ICD-10-CM

## 2021-07-07 DIAGNOSIS — Z00.00 ROUTINE GENERAL MEDICAL EXAMINATION AT A HEALTH CARE FACILITY: Primary | ICD-10-CM

## 2021-07-07 DIAGNOSIS — F51.01 PRIMARY INSOMNIA: ICD-10-CM

## 2021-07-07 DIAGNOSIS — F32.0 MILD MAJOR DEPRESSION (H): ICD-10-CM

## 2021-07-07 DIAGNOSIS — Z13.6 ENCOUNTER FOR LIPID SCREENING FOR CARDIOVASCULAR DISEASE: ICD-10-CM

## 2021-07-07 DIAGNOSIS — E78.5 HYPERLIPIDEMIA LDL GOAL <100: ICD-10-CM

## 2021-07-07 DIAGNOSIS — R73.03 PRE-DIABETES: ICD-10-CM

## 2021-07-07 DIAGNOSIS — G43.709 CHRONIC MIGRAINE WITHOUT AURA WITHOUT STATUS MIGRAINOSUS, NOT INTRACTABLE: ICD-10-CM

## 2021-07-07 DIAGNOSIS — L98.9 FACE LESION: ICD-10-CM

## 2021-07-07 DIAGNOSIS — R71.8 RBC MICROCYTOSIS: ICD-10-CM

## 2021-07-07 DIAGNOSIS — G43.009 MIGRAINE WITHOUT AURA AND WITHOUT STATUS MIGRAINOSUS, NOT INTRACTABLE: ICD-10-CM

## 2021-07-07 DIAGNOSIS — F17.200 SMOKER: ICD-10-CM

## 2021-07-07 DIAGNOSIS — Z13.29 SCREENING FOR ENDOCRINE, METABOLIC AND IMMUNITY DISORDER: ICD-10-CM

## 2021-07-07 DIAGNOSIS — Z13.220 ENCOUNTER FOR LIPID SCREENING FOR CARDIOVASCULAR DISEASE: ICD-10-CM

## 2021-07-07 DIAGNOSIS — Z12.11 ENCOUNTER FOR SCREENING COLONOSCOPY: ICD-10-CM

## 2021-07-07 DIAGNOSIS — Z12.31 VISIT FOR SCREENING MAMMOGRAM: ICD-10-CM

## 2021-07-07 DIAGNOSIS — J20.9 BRONCHITIS WITH BRONCHOSPASM: ICD-10-CM

## 2021-07-07 LAB
ALBUMIN SERPL-MCNC: 3.7 G/DL (ref 3.4–5)
ALP SERPL-CCNC: 122 U/L (ref 40–150)
ALT SERPL W P-5'-P-CCNC: 20 U/L (ref 0–50)
ANION GAP SERPL CALCULATED.3IONS-SCNC: 6 MMOL/L (ref 3–14)
AST SERPL W P-5'-P-CCNC: 23 U/L (ref 0–45)
BASOPHILS # BLD AUTO: 0 10E9/L (ref 0–0.2)
BASOPHILS NFR BLD AUTO: 0.2 %
BILIRUB SERPL-MCNC: 0.4 MG/DL (ref 0.2–1.3)
BUN SERPL-MCNC: 13 MG/DL (ref 7–30)
CALCIUM SERPL-MCNC: 9.2 MG/DL (ref 8.5–10.1)
CHLORIDE SERPL-SCNC: 106 MMOL/L (ref 94–109)
CHOLEST SERPL-MCNC: 184 MG/DL
CO2 SERPL-SCNC: 25 MMOL/L (ref 20–32)
CREAT SERPL-MCNC: 0.78 MG/DL (ref 0.52–1.04)
DIFFERENTIAL METHOD BLD: ABNORMAL
EOSINOPHIL # BLD AUTO: 0.2 10E9/L (ref 0–0.7)
EOSINOPHIL NFR BLD AUTO: 2.9 %
ERYTHROCYTE [DISTWIDTH] IN BLOOD BY AUTOMATED COUNT: 15.3 % (ref 10–15)
FERRITIN SERPL-MCNC: 64 NG/ML (ref 8–252)
GFR SERPL CREATININE-BSD FRML MDRD: 89 ML/MIN/{1.73_M2}
GLUCOSE SERPL-MCNC: 88 MG/DL (ref 70–99)
HBA1C MFR BLD: 6.1 % (ref 0–5.6)
HCT VFR BLD AUTO: 39.5 % (ref 35–47)
HDLC SERPL-MCNC: 29 MG/DL
HGB BLD-MCNC: 12.8 G/DL (ref 11.7–15.7)
IRON SATN MFR SERPL: 19 % (ref 15–46)
IRON SERPL-MCNC: 70 UG/DL (ref 35–180)
LDLC SERPL CALC-MCNC: 110 MG/DL
LYMPHOCYTES # BLD AUTO: 4 10E9/L (ref 0.8–5.3)
LYMPHOCYTES NFR BLD AUTO: 47.8 %
MCH RBC QN AUTO: 22.7 PG (ref 26.5–33)
MCHC RBC AUTO-ENTMCNC: 32.4 G/DL (ref 31.5–36.5)
MCV RBC AUTO: 70 FL (ref 78–100)
MONOCYTES # BLD AUTO: 0.7 10E9/L (ref 0–1.3)
MONOCYTES NFR BLD AUTO: 8.2 %
NEUTROPHILS # BLD AUTO: 3.4 10E9/L (ref 1.6–8.3)
NEUTROPHILS NFR BLD AUTO: 40.9 %
NONHDLC SERPL-MCNC: 155 MG/DL
PLATELET # BLD AUTO: 277 10E9/L (ref 150–450)
POTASSIUM SERPL-SCNC: 4.3 MMOL/L (ref 3.4–5.3)
PROT SERPL-MCNC: 8 G/DL (ref 6.8–8.8)
RBC # BLD AUTO: 5.65 10E12/L (ref 3.8–5.2)
SODIUM SERPL-SCNC: 137 MMOL/L (ref 133–144)
TIBC SERPL-MCNC: 361 UG/DL (ref 240–430)
TRIGL SERPL-MCNC: 225 MG/DL
WBC # BLD AUTO: 8.4 10E9/L (ref 4–11)

## 2021-07-07 PROCEDURE — 83036 HEMOGLOBIN GLYCOSYLATED A1C: CPT | Performed by: FAMILY MEDICINE

## 2021-07-07 PROCEDURE — 36415 COLL VENOUS BLD VENIPUNCTURE: CPT | Performed by: FAMILY MEDICINE

## 2021-07-07 PROCEDURE — 90471 IMMUNIZATION ADMIN: CPT | Performed by: FAMILY MEDICINE

## 2021-07-07 PROCEDURE — 99396 PREV VISIT EST AGE 40-64: CPT | Mod: 25 | Performed by: FAMILY MEDICINE

## 2021-07-07 PROCEDURE — 82728 ASSAY OF FERRITIN: CPT | Performed by: FAMILY MEDICINE

## 2021-07-07 PROCEDURE — 83540 ASSAY OF IRON: CPT | Performed by: FAMILY MEDICINE

## 2021-07-07 PROCEDURE — 80053 COMPREHEN METABOLIC PANEL: CPT | Performed by: FAMILY MEDICINE

## 2021-07-07 PROCEDURE — 85025 COMPLETE CBC W/AUTO DIFF WBC: CPT | Performed by: FAMILY MEDICINE

## 2021-07-07 PROCEDURE — 83550 IRON BINDING TEST: CPT | Performed by: FAMILY MEDICINE

## 2021-07-07 PROCEDURE — 99213 OFFICE O/P EST LOW 20 MIN: CPT | Mod: 25 | Performed by: FAMILY MEDICINE

## 2021-07-07 PROCEDURE — 90732 PPSV23 VACC 2 YRS+ SUBQ/IM: CPT | Performed by: FAMILY MEDICINE

## 2021-07-07 PROCEDURE — 80061 LIPID PANEL: CPT | Performed by: FAMILY MEDICINE

## 2021-07-07 RX ORDER — KETOROLAC TROMETHAMINE 10 MG/1
10 TABLET, FILM COATED ORAL EVERY 6 HOURS PRN
Qty: 4 TABLET | Refills: 0 | Status: SHIPPED | OUTPATIENT
Start: 2021-07-07 | End: 2022-04-05

## 2021-07-07 RX ORDER — ALBUTEROL SULFATE 90 UG/1
2 AEROSOL, METERED RESPIRATORY (INHALATION) EVERY 4 HOURS PRN
Qty: 18 G | Refills: 3 | Status: SHIPPED | OUTPATIENT
Start: 2021-07-07 | End: 2021-10-15

## 2021-07-07 RX ORDER — PREDNISONE 10 MG/1
10 TABLET ORAL DAILY
Status: CANCELLED | OUTPATIENT
Start: 2021-07-07

## 2021-07-07 RX ORDER — IPRATROPIUM BROMIDE AND ALBUTEROL SULFATE 2.5; .5 MG/3ML; MG/3ML
1 SOLUTION RESPIRATORY (INHALATION) EVERY 6 HOURS PRN
Qty: 3 ML | Refills: 1 | Status: SHIPPED | OUTPATIENT
Start: 2021-07-07 | End: 2021-10-15

## 2021-07-07 RX ORDER — ROSUVASTATIN CALCIUM 20 MG/1
20 TABLET, COATED ORAL DAILY
Qty: 90 TABLET | Refills: 0 | Status: SHIPPED | OUTPATIENT
Start: 2021-07-07 | End: 2021-09-01

## 2021-07-07 RX ORDER — CLONAZEPAM 0.5 MG/1
.25-.5 TABLET ORAL
Qty: 45 TABLET | Refills: 1 | Status: SHIPPED | OUTPATIENT
Start: 2021-07-07 | End: 2022-02-15

## 2021-07-07 RX ORDER — NORTRIPTYLINE HCL 10 MG
10 CAPSULE ORAL AT BEDTIME
Qty: 30 CAPSULE | Refills: 1 | Status: SHIPPED | OUTPATIENT
Start: 2021-07-07

## 2021-07-07 RX ORDER — ALBUTEROL SULFATE 0.83 MG/ML
2.5 SOLUTION RESPIRATORY (INHALATION) EVERY 6 HOURS PRN
Qty: 3 ML | Refills: 3 | Status: SHIPPED | OUTPATIENT
Start: 2021-07-07 | End: 2021-10-15

## 2021-07-07 RX ORDER — FLUTICASONE PROPIONATE 50 MCG
1-2 SPRAY, SUSPENSION (ML) NASAL DAILY
Qty: 1 G | Refills: 11 | Status: SHIPPED | OUTPATIENT
Start: 2021-07-07 | End: 2022-07-19

## 2021-07-07 RX ORDER — CLONAZEPAM 0.5 MG/1
.25-.5 TABLET ORAL
Qty: 45 TABLET | Refills: 1 | Status: SHIPPED | OUTPATIENT
Start: 2021-07-07 | End: 2021-07-07

## 2021-07-07 ASSESSMENT — ENCOUNTER SYMPTOMS
JOINT SWELLING: 1
MYALGIAS: 1
PARESTHESIAS: 1
CHILLS: 0
HEMATURIA: 0
EYE PAIN: 0
NERVOUS/ANXIOUS: 1
HEADACHES: 1
SHORTNESS OF BREATH: 0
BREAST MASS: 0
NAUSEA: 0
HEMATOCHEZIA: 0
COUGH: 0
DIZZINESS: 1
SORE THROAT: 0
DIARRHEA: 1
FEVER: 0
WEAKNESS: 0
ARTHRALGIAS: 1
PALPITATIONS: 0
HEARTBURN: 1
ABDOMINAL PAIN: 1
DYSURIA: 0
CONSTIPATION: 0
FREQUENCY: 0

## 2021-07-07 ASSESSMENT — MIFFLIN-ST. JEOR: SCORE: 1516.16

## 2021-07-07 ASSESSMENT — PATIENT HEALTH QUESTIONNAIRE - PHQ9
SUM OF ALL RESPONSES TO PHQ QUESTIONS 1-9: 9
SUM OF ALL RESPONSES TO PHQ QUESTIONS 1-9: 9
10. IF YOU CHECKED OFF ANY PROBLEMS, HOW DIFFICULT HAVE THESE PROBLEMS MADE IT FOR YOU TO DO YOUR WORK, TAKE CARE OF THINGS AT HOME, OR GET ALONG WITH OTHER PEOPLE: SOMEWHAT DIFFICULT

## 2021-07-07 ASSESSMENT — ANXIETY QUESTIONNAIRES
2. NOT BEING ABLE TO STOP OR CONTROL WORRYING: NEARLY EVERY DAY
7. FEELING AFRAID AS IF SOMETHING AWFUL MIGHT HAPPEN: SEVERAL DAYS
4. TROUBLE RELAXING: SEVERAL DAYS
3. WORRYING TOO MUCH ABOUT DIFFERENT THINGS: NEARLY EVERY DAY
GAD7 TOTAL SCORE: 13
GAD7 TOTAL SCORE: 13
7. FEELING AFRAID AS IF SOMETHING AWFUL MIGHT HAPPEN: SEVERAL DAYS
6. BECOMING EASILY ANNOYED OR IRRITABLE: SEVERAL DAYS
5. BEING SO RESTLESS THAT IT IS HARD TO SIT STILL: SEVERAL DAYS
1. FEELING NERVOUS, ANXIOUS, OR ON EDGE: NEARLY EVERY DAY
GAD7 TOTAL SCORE: 13

## 2021-07-07 NOTE — NURSING NOTE
Prior to immunization administration, verified patients identity using patient s name and date of birth. Please see Immunization Activity for additional information.     Screening Questionnaire for Adult Immunization    Are you sick today?   No   Do you have allergies to medications, food, a vaccine component or latex?   No   Have you ever had a serious reaction after receiving a vaccination?   No   Do you have a long-term health problem with heart, lung, kidney, or metabolic disease (e.g., diabetes), asthma, a blood disorder, no spleen, complement component deficiency, a cochlear implant, or a spinal fluid leak?  Are you on long-term aspirin therapy?   Yes   Do you have cancer, leukemia, HIV/AIDS, or any other immune system problem?   No   Do you have a parent, brother, or sister with an immune system problem?   Yes   In the past 3 months, have you taken medications that affect  your immune system, such as prednisone, other steroids, or anticancer drugs; drugs for the treatment of rheumatoid arthritis, Crohn s disease, or psoriasis; or have you had radiation treatments?   Yes   Have you had a seizure, or a brain or other nervous system problem?   No   During the past year, have you received a transfusion of blood or blood    products, or been given immune (gamma) globulin or antiviral drug?   No   For women: Are you pregnant or is there a chance you could become       pregnant during the next month?   No   Have you received any vaccinations in the past 4 weeks?   No       P23 given by Briseyda Cervantes. Patient instructed to remain in clinic for 15 minutes afterwards, and to report any adverse reaction to me immediately.

## 2021-07-07 NOTE — LETTER
My Asthma Action Plan   Name: <Patient Name>  Date: <Date>   My Doctor or Clinic: [facility]  My Doctor or Clinic Phone: <Provider>, <Clinic Phone Number>  My Asthma Severity: <Moderate Persistent>  My Peak Flow Number: <550>  Avoid your asthma triggers: <Strong smells , Smoke, Colds/flu>        GO      I feel good    No cough or wheeze    Can work, sleep and play without  asthma symptoms  My peak flow number is  above <440>       Green Zone:  Asthma in good control    Take your asthma control medicine every day: <Medications>  If exercise triggers your asthma, take:   <Medication>, <Dose>  15 minutes before exercise or sports, and  During exercise if you have asthma symptoms  Spacer to use with inhaler: <Holding Chamber>       Slow      I have ANY of these:           I do not feel good    Cough or wheeze    Chest feels tight    Wake up at night   My peak flow number is  between <275> and <440>       Yellow Zone:  Asthma getting worse  Keep taking your Green Zone medications  Start taking your rescue medicine:   <Medication>, <Dose> every 20 minutes for up to 1 hour.  Then every 4 hours for 24-48 hours.  If you do not return to the Green Zone in 12-24 hours, or you get worse, start taking your oral steroid medicine:   <Medication>, <Dose and Duration>  If you stay in the Yellow Zone for more than 12-24 hours, you re your doctor. 1.

## 2021-07-07 NOTE — PROGRESS NOTES
SUBJECTIVE:   CC: Jessie Seymour is an 50 year old woman who presents for preventive health visit.     Patient has been advised of split billing requirements and indicates understanding: Yes     Healthy Habits:     Getting at least 3 servings of Calcium per day:  Yes    Bi-annual eye exam:  Yes    Dental care twice a year:  NO    Sleep apnea or symptoms of sleep apnea:  Excessive snoring    Diet:  Other    Frequency of exercise:  1 day/week    Duration of exercise:  15-30 minutes    Taking medications regularly:  No    Barriers to taking medications:  Other    Medication side effects:  Other    PHQ-2 Total Score: 2    Additional concerns today:  Yes    Concerns about facial lesions and itching on both hands.    Depression Followup    How are you doing with your depression since your last visit? No change    Are you having other symptoms that might be associated with depression? No    Have you had a significant life event?  Relationship Concerns and Job Concerns     Are you feeling anxious or having panic attacks?   No    Do you have any concerns with your use of alcohol or other drugs? No    Social History     Tobacco Use     Smoking status: Current Every Day Smoker     Packs/day: 0.50     Years: 15.00     Pack years: 7.50     Types: Cigarettes     Smokeless tobacco: Never Used     Tobacco comment: Pt. smokes appx. 6 cigarettes daily   Substance Use Topics     Alcohol use: Yes     Alcohol/week: 0.0 standard drinks     Comment: Rarely     Drug use: No     PHQ 2/8/2021 3/15/2021 7/7/2021   PHQ-9 Total Score 11 11 9   Q9: Thoughts of better off dead/self-harm past 2 weeks Not at all Not at all Not at all     BON-7 SCORE 2/8/2021 3/15/2021 7/7/2021   Total Score - - -   Total Score - - 13 (moderate anxiety)   Total Score 11 12 13     Last PHQ-9 7/7/2021   1.  Little interest or pleasure in doing things 1   2.  Feeling down, depressed, or hopeless 1   3.  Trouble falling or staying asleep, or sleeping too much 3    4.  Feeling tired or having little energy 1   5.  Poor appetite or overeating 1   6.  Feeling bad about yourself 1   7.  Trouble concentrating 1   8.  Moving slowly or restless 0   Q9: Thoughts of better off dead/self-harm past 2 weeks 0   PHQ-9 Total Score 9   Difficulty at work, home, or with people -     BON-7  7/7/2021   1. Feeling nervous, anxious, or on edge 3   2. Not being able to stop or control worrying 3   3. Worrying too much about different things 3   4. Trouble relaxing 1   5. Being so restless that it is hard to sit still 1   6. Becoming easily annoyed or irritable 1   7. Feeling afraid, as if something awful might happen 1   BON-7 Total Score 13   If you checked any problems, how difficult have they made it for you to do your work, take care of things at home, or get along with other people? -       Suicide Assessment Five-step Evaluation and Treatment (SAFE-T)    Asthma Follow-Up    Was ACT completed today?    Yes    ACT Total Scores 7/7/2021   ACT TOTAL SCORE -   ASTHMA ER VISITS -   ASTHMA HOSPITALIZATIONS -   ACT TOTAL SCORE (Goal Greater than or Equal to 20) 19   In the past 12 months, how many times did you visit the emergency room for your asthma without being admitted to the hospital? 0   In the past 12 months, how many times were you hospitalized overnight because of your asthma? 0         How many days per week do you miss taking your asthma controller medication?  I do not have an asthma controller medication    Please describe any recent triggers for your asthma: smoke    Have you had any Emergency Room Visits, Urgent Care Visits, or Hospital Admissions since your last office visit?  No    Migraine     Since your last clinic visit, how have your headaches changed?  No change    How often are you getting headaches or migraines? None     Are you able to do normal daily activities when you have a migraine? Yes    Are you taking rescue/relief medications? (Select all that apply) No    How  helpful is your rescue/relief medication?  I get total relief    Are you taking any medications to prevent migraines? (Select all that apply)  Amitriptyline    In the past 4 weeks, how often have you gone to urgent care or the emergency room because of your headaches?  0      Today's PHQ-2 Score:   PHQ-2 ( 1999 Pfizer) 7/7/2021   Q1: Little interest or pleasure in doing things 1   Q2: Feeling down, depressed or hopeless 1   PHQ-2 Score 2   Q1: Little interest or pleasure in doing things Several days   Q2: Feeling down, depressed or hopeless Several days   PHQ-2 Score 2     Answers for HPI/ROS submitted by the patient on 7/7/2021   Annual Exam:  If you checked off any problems, how difficult have these problems made it for you to do your work, take care of things at home, or get along with other people?: Somewhat difficult  PHQ9 TOTAL SCORE: 9  BON 7 TOTAL SCORE: 13      Abuse: Current or Past (Physical, Sexual or Emotional) - No  Do you feel safe in your environment? Yes    Have you ever done Advance Care Planning? (For example, a Health Directive, POLST, or a discussion with a medical provider or your loved ones about your wishes): No, advance care planning information given to patient to review.  Advanced care planning was discussed at today's visit.    Social History     Tobacco Use     Smoking status: Current Every Day Smoker     Packs/day: 0.50     Years: 15.00     Pack years: 7.50     Types: Cigarettes     Smokeless tobacco: Never Used     Tobacco comment: Pt. smokes appx. 6 cigarettes daily   Substance Use Topics     Alcohol use: Yes     Alcohol/week: 0.0 standard drinks     Comment: Rarely         Alcohol Use 7/7/2021   Prescreen: >3 drinks/day or >7 drinks/week? No   Prescreen: >3 drinks/day or >7 drinks/week? -   No flowsheet data found.    Reviewed orders with patient.  Reviewed health maintenance and updated orders accordingly - Yes  BP Readings from Last 3 Encounters:   07/07/21 132/84   04/02/21 126/59    10/16/20 (!) 141/55    Wt Readings from Last 3 Encounters:   07/07/21 97.8 kg (215 lb 11.2 oz)   10/16/20 99.9 kg (220 lb 4.8 oz)   10/13/20 94.3 kg (208 lb)                  Patient Active Problem List   Diagnosis     iamLUMBAGO     iamMV COLLISION NOS-     MEDICAL HISTORY OF - VERY DIFFICULT LAB DRAW and IV START     Allergic rhinitis     Tobacco use disorder     Family history of breast cancer     Obesity     Female pelvic pain     Intermittent asthma     Migraine headache     Melasma     Burping     Hiatal hernia     Smoker     Alpha-thalassemia (H)     Moderate aortic regurgitation     Snoring     Bartholin's cyst     Diverticulitis of colon     Mild major depression (H)     Morbid obesity (H)     Hyperlipidemia LDL goal <100     RBC microcytosis     Pre-diabetes     Past Surgical History:   Procedure Laterality Date     BIOPSY       BIOPSY BREAST SEED LOCALIZATION Left 1/21/2016    Procedure: BIOPSY BREAST SEED LOCALIZATION;  Surgeon: Perry Desai MD;  Location:  OR     COLONOSCOPY       DILATION AND CURETTAGE, HYSTEROSCOPY DIAGNOSTIC, COMBINED N/A 5/24/2016    Procedure: COMBINED DILATION AND CURETTAGE, HYSTEROSCOPY DIAGNOSTIC;  Surgeon: Adis Cummings MD;  Location:  OR     ESOPHAGOSCOPY, GASTROSCOPY, DUODENOSCOPY (EGD), COMBINED  7/8/2014    Procedure: COMBINED ESOPHAGOSCOPY, GASTROSCOPY, DUODENOSCOPY (EGD), BIOPSY SINGLE OR MULTIPLE;  Surgeon: Rodolfo Carlin MD;  Location:  GI     EYE SURGERY       GYN SURGERY       HERNIA REPAIR       HYSTERECTOMY TOTAL ABDOMINAL N/A 6/28/2016    Procedure: HYSTERECTOMY TOTAL ABDOMINAL;  Surgeon: Adis Cummings MD;  Location:  OR     LAPAROSCOPIC CHOLECYSTECTOMY  1991    Cholecystectomy, Laparoscopic     LAPAROSCOPY DIAGNOSTIC (GYN) N/A 6/28/2016    Procedure: LAPAROSCOPY DIAGNOSTIC (GYN);  Surgeon: Adis Cummings MD;  Location:  OR     LAPAROTOMY, LYSIS ADHESIONS, COMBINED N/A 6/28/2016    Procedure: COMBINED LAPAROTOMY, LYSIS  ADHESIONS;  Surgeon: Adis Cummings MD;  Location: [facility] OR     MARSUPIALIZATION BARTHOLIN CYST Left 10/13/2020    Procedure: MARSUPIALIZATION OF LEFT BARTHOLIN'S CYST;  Surgeon: Chris Freeman MD;  Location: [facility] OR     PELVIS LAPAROSCOPY,DX      Laparoscopy for endometriosis     SURGICAL HISTORY OF -       left ovarian surgery for herniation, benign       Social History     Tobacco Use     Smoking status: Current Every Day Smoker     Packs/day: 0.50     Years: 15.00     Pack years: 7.50     Types: Cigarettes     Smokeless tobacco: Never Used     Tobacco comment: Pt. smokes appx. 6 cigarettes daily   Substance Use Topics     Alcohol use: Yes     Alcohol/week: 0.0 standard drinks     Comment: Rarely     Family History   Problem Relation Age of Onset     Cardiovascular Mother         long QT syndrom     Heart Disease Mother         entire family has heart problems of some type     Hypertension Mother      Depression Mother      Breast Cancer Mother      Thyroid Disease Mother      Osteoporosis Mother      Thyroid Disease Mother      Dementia Mother      Heart Disease Father         not sure about details, had stroke     Hypertension Father      Heart Disease Brother         CP Hypertension-7 brothers-5 alive right now      Hypertension Brother      Heart Disease Brother         CP Hypertension     Genetic Disorder Brother      Heart Disease Sister         stroke      Diabetes Sister      Heart Disease Brother         long QT syndrome- at age of 16     Heart Disease Maternal Grandmother      Hypertension Maternal Grandmother      Heart Disease Maternal Grandfather      Hypertension Maternal Grandfather      Diabetes Sister         6 sisters     Musculoskeletal Disorder Sister      Genetic Disorder Sister         lupus     Hypertension Sister      Diabetes Maternal Uncle      Hypertension Sister      Cerebrovascular Disease Sister      Hypertension Brother      Hypertension Paternal Grandmother       Hypertension Paternal Grandfather      Hypertension Sister      Asthma Sister      Allergies Other         most everyone in family has some type of allergy     Asthma Other      Cancer Other 40        one maternal cousin with bca in 50s, one paternal cousin with bca in 40s     Cerebrovascular Disease Sister      Osteoporosis Sister      Gynecology Sister         3 sisters with PCOS     Blood Disease Sister         trade for sickle cell anemia     Diabetes Sister      Hypertension Sister      Cerebrovascular Disease Sister      Depression Sister      Asthma Sister      Osteoporosis Sister      Genetic Disorder Sister      Diabetes Other      Hypertension Cousin      Other Cancer Cousin      Breast Cancer Cousin      Other Cancer Other      Asthma Nephew      Thyroid Disease Other      Cancer - colorectal No family hx of          Current Outpatient Medications   Medication Sig Dispense Refill     acetaminophen (TYLENOL) 325 MG tablet Take 325-650 mg by mouth       albuterol (PROAIR HFA) 108 (90 Base) MCG/ACT inhaler Inhale 2 puffs into the lungs every 4 hours as needed for shortness of breath / dyspnea or wheezing 18 g 3     albuterol (PROVENTIL) (2.5 MG/3ML) 0.083% neb solution Take 1 vial (2.5 mg) by nebulization every 6 hours as needed for shortness of breath / dyspnea 3 mL 3     aspirin 81 MG tablet Take 81 mg by mouth daily        camphor-menthol (SARNA) 0.5-0.5 % external lotion Apply 1 mL topically every 6 hours as needed for skin care Apply to palms of hands 2-3 times daily 59 mL 1     clonazePAM (KLONOPIN) 0.5 MG tablet Take 0.5-1 tablets (0.25-0.5 mg) by mouth nightly as needed for anxiety or sleep 45 tablet 1     diphenhydrAMINE (BENADRYL) 25 MG tablet Take 1-2 tablets (25-50 mg) by mouth every 6 hours as needed for itching or allergies 60 tablet 1     fluticasone (FLONASE) 50 MCG/ACT nasal spray Spray 1-2 sprays into both nostrils daily 1 g 11     hydrocortisone valerate (WEST-LESLY) 0.2 % external ointment  Apply sparingly to affected area three times daily as needed. 60 g 0     ibuprofen (ADVIL/MOTRIN) 600 MG tablet Take 1 tablet (600 mg) by mouth every 6 hours as needed for pain Take w/ food 30 tablet 0     ipratropium - albuterol 0.5 mg/2.5 mg/3 mL (DUONEB) 0.5-2.5 (3) MG/3ML neb solution Take 1 vial (3 mLs) by nebulization every 6 hours as needed for shortness of breath / dyspnea or wheezing 3 mL 1     ketorolac (TORADOL) 10 MG tablet Take 1 tablet (10 mg) by mouth every 6 hours as needed for pain 4 tablet 0     melatonin 3 MG tablet Take 3 mg by mouth       nortriptyline (PAMELOR) 10 MG capsule Take 1 capsule (10 mg) by mouth At Bedtime Every 3 days 30 capsule 1     predniSONE (DELTASONE) 10 MG tablet Take 10 mg by mouth daily Uses for asthma as needed       rosuvastatin (CRESTOR) 20 MG tablet Take 1 tablet (20 mg) by mouth daily 90 tablet 0     omeprazole (PRILOSEC) 10 MG DR capsule Take 10 mg by mouth daily as needed        Allergies   Allergen Reactions     Loratadine Other (See Comments)     Dries her up and gets rare sinus infection     Morphine Itching     PN: LW Reaction: Itching, Pruritis     Oxycodone Nausea and Vomiting and Itching     Other reaction(s): Diaphoresis  Other reaction(s): Diaphoresis     Sumatriptan      Worsens migraine     Zofran [Ondansetron]      CARDIO recommended never to take this medication      Codeine Anxiety     Becomes tearful     Hydrocodone-Acetaminophen Anxiety     Recent Labs   Lab Test 07/07/21  1010 04/02/21  1331 09/23/20  1426 02/18/20  0907 02/18/20  0907 03/07/17  0826 03/07/17  0826 12/26/15  1007 12/26/15  1007   A1C 6.1*  --   --   --   --   --   --   --   --    *  --   --   --  129*  --  94  --  94   HDL 29*  --   --   --  30*  --  35*  --  27*   TRIG 225*  --   --   --  162*  --  110  --  166*   ALT 20 27 21   < > 20   < > 19  --  12   CR 0.78 0.81 0.91   < > 0.80   < > 0.82   < > 0.85   GFRESTIMATED 89 85 74   < > 87   < > 76   < > 72   GFRESTBLACK >90 >90  85   < > >90   < > >90  African American GFR Calc     < > 87   POTASSIUM 4.3 3.7 3.4   < > 4.0   < > 4.0   < > 4.2   TSH  --   --   --   --  1.10  --   --   --  1.11    < > = values in this interval not displayed.        Breast Cancer Screening:      FHS-7:   Breast CA Risk Assessment (FHS-7) 7/7/2021   Did any of your first-degree relatives have breast or ovarian cancer? Yes   Did any of your relatives have bilateral breast cancer? No   Did any man in your family have breast cancer? No   Did any woman in your family have breast and ovarian cancer? Yes   Did any woman in your family have breast cancer before age 50 y? Yes   Do you have 2 or more relatives with breast and/or ovarian cancer? Yes   Do you have 2 or more relatives with breast and/or bowel cancer? Yes     click delete button to remove this line now  Mammogram Screening: Recommended annual mammography and Mammogram Screening: Recommended mammography every 1-2 years with patient discussion and risk factor consideration  Pertinent mammograms are reviewed under the imaging tab.    History of abnormal Pap smear: NO - age 30-65 PAP every 5 years with negative HPV co-testing recommended  NO - age 65 - see link Cervical Cytology Screening Guidelines  PAP / HPV Latest Ref Rng & Units 4/11/2016 8/8/2013 1/30/2012   PAP - NIL NIL NIL   HPV 16 DNA NEG Negative - -   HPV 18 DNA NEG Negative - -   OTHER HR HPV NEG Negative - -     Reviewed and updated as needed this visit by clinical staff  Tobacco  Allergies  Meds              Reviewed and updated as needed this visit by Provider                    Review of Systems   Constitutional: Negative for chills and fever.   HENT: Positive for ear pain and hearing loss. Negative for congestion and sore throat.    Eyes: Positive for visual disturbance. Negative for pain.   Respiratory: Negative for cough and shortness of breath.    Cardiovascular: Positive for peripheral edema. Negative for chest pain and palpitations.  "  Gastrointestinal: Positive for abdominal pain, diarrhea and heartburn. Negative for constipation, hematochezia and nausea.   Breasts:  Positive for tenderness and discharge. Negative for breast mass.   Genitourinary: Negative for dysuria, frequency, genital sores, hematuria, pelvic pain, urgency, vaginal bleeding and vaginal discharge.   Musculoskeletal: Positive for arthralgias, joint swelling and myalgias.   Skin: Negative for rash.   Neurological: Positive for dizziness, headaches and paresthesias. Negative for weakness.   Psychiatric/Behavioral: Positive for mood changes. The patient is nervous/anxious.         OBJECTIVE:   /84   Pulse 83   Resp 16   Ht 1.51 m (4' 11.45\")   Wt 97.8 kg (215 lb 11.2 oz)   LMP 06/22/2016 (Exact Date)   SpO2 100%   BMI 42.91 kg/m    Physical Exam  GENERAL: healthy, alert and no distress  EYES: Eyes grossly normal to inspection, PERRL and conjunctivae and sclerae normal  HENT: ear canals and TM's normal, nose and mouth without ulcers or lesions  NECK: no adenopathy, no asymmetry, masses, or scars and thyroid normal to palpation  RESP: lungs clear to auscultation - no rales, rhonchi or wheezes  BREAST: normal without masses, tenderness or nipple discharge and no palpable axillary masses or adenopathy  CV: regular rate and rhythm, normal S1 S2, no S3 or S4, no murmur, click or rub, no peripheral edema and peripheral pulses strong  ABDOMEN: soft, nontender, no hepatosplenomegaly, no masses and bowel sounds normal  MS: no gross musculoskeletal defects noted, no edema  SKIN: [place], SK face, erythematous macule bilateral cheek  NEURO: Normal strength and tone, mentation intact and speech normal  PSYCH: mentation appears normal, affect normal/bright        ASSESSMENT/PLAN:   1. Routine general medical examination at a health care facility  See counseling  - REVIEW OF HEALTH MAINTENANCE PROTOCOL ORDERS    2. Migraine without aura and without status migrainosus, not " intractable  Stable, continue present management  - ketorolac (TORADOL) 10 MG tablet; Take 1 tablet (10 mg) by mouth every 6 hours as needed for pain  Dispense: 4 tablet; Refill: 0    3. Primary insomnia  Not for long term management  - clonazePAM (KLONOPIN) 0.5 MG tablet; Take 0.5-1 tablets (0.25-0.5 mg) by mouth nightly as needed for anxiety or sleep  Dispense: 45 tablet; Refill: 1    4. Bronchitis with bronchospasm  - albuterol (PROAIR HFA) 108 (90 Base) MCG/ACT inhaler; Inhale 2 puffs into the lungs every 4 hours as needed for shortness of breath / dyspnea or wheezing  Dispense: 18 g; Refill: 3  - albuterol (PROVENTIL) (2.5 MG/3ML) 0.083% neb solution; Take 1 vial (2.5 mg) by nebulization every 6 hours as needed for shortness of breath / dyspnea  Dispense: 3 mL; Refill: 3    5. Seasonal allergic rhinitis, unspecified trigger  - fluticasone (FLONASE) 50 MCG/ACT nasal spray; Spray 1-2 sprays into both nostrils daily  Dispense: 1 g; Refill: 11  - albuterol (PROVENTIL) (2.5 MG/3ML) 0.083% neb solution; Take 1 vial (2.5 mg) by nebulization every 6 hours as needed for shortness of breath / dyspnea  Dispense: 3 mL; Refill: 3    6. Intermittent asthma, uncomplicated  Stable, continue present management  - ipratropium - albuterol 0.5 mg/2.5 mg/3 mL (DUONEB) 0.5-2.5 (3) MG/3ML neb solution; Take 1 vial (3 mLs) by nebulization every 6 hours as needed for shortness of breath / dyspnea or wheezing  Dispense: 3 mL; Refill: 1    7. Advance care planning  - HONORING CHOICES REFERRAL    8. Chronic migraine without aura without status migrainosus, not intractable  Stable, continue present management  - nortriptyline (PAMELOR) 10 MG capsule; Take 1 capsule (10 mg) by mouth At Bedtime Every 3 days  Dispense: 30 capsule; Refill: 1  - CBC with platelets and differential    9. Visit for screening mammogram  - *MA Screening Digital Bilateral; Future    10. Smoker  The patient is sincerely urged to quit smoking. The numerous direct  health benefits are discussed. If she decides to quit, the are a number of helpful adjunctive aids, and she can see me to discuss nicotine replacement therapy and Zyban anytime in the future.        11. Mild major depression (H)  She declines ssri, self discontinued setraline  We discussed the treatment for anxiety and depression in detail.  The importance of a multi faceted approach in controlling symptoms was reviewed.  The benefits of cognitive behavioral therapy reviewed, benefits of exercise, and stress reduction also discussed.       Duration of treatment is at least 9 months with medication. It may take 3-4 weeks before symptom improvement happens.  Do not stop medication suddenly, medication will need to be tapered off.  Slight increased risk of suicide with SSRI group of medications discussed.       I ended our visit today by discussing the patient's diagnoses and recommended treatment. Please refer to today's diagnoses and orders for further details. I briefly discussed the pathophysiology of these conditions and outlined their expected course. I discussed the warning symptoms and signs that indicate an atypical course that would need urgent or emergent care. I also discussed self care strategies for symptom relief.  Patient voiced complete understanding of plan of care and was in full agreement to proceed. After visit summary discussed and handed to patient.    Common side effects of medications prescribed at this visit were discussed with the patient. Severe side effects, including current applicable black box warnings, were discussed.    12. Encounter for lipid screening for cardiovascular disease  - Comprehensive metabolic panel  - Lipid panel reflex to direct LDL Fasting    13. Screening for endocrine, metabolic and immunity disorder  - Hemoglobin A1c    14. Iron deficiency anemia, unspecified iron deficiency anemia type  - Iron and iron binding capacity  - Ferritin    15. Encounter for screening  "colonoscopy  - GASTROENTEROLOGY ADULT REF PROCEDURE ONLY; Future    16. Skin lesion  - ADULT DERMATOLOGY REFERRAL; Future    17. Face lesion  - ADULT DERMATOLOGY REFERRAL; Future    18. Hyperlipidemia LDL goal <100  - rosuvastatin (CRESTOR) 20 MG tablet; Take 1 tablet (20 mg) by mouth daily  Dispense: 90 tablet; Refill: 0  - **Basic metabolic panel FUTURE 6mo; Future  - Lipid panel reflex to direct LDL Fasting; Future    The 10-year ASCVD risk score (Factoryvilleannette SANTOS Jr., et al., 2013) is: 7.2%    Values used to calculate the score:      Age: 50 years      Sex: Female      Is Non- : No      Diabetic: No      Tobacco smoker: Yes      Systolic Blood Pressure: 132 mmHg      Is BP treated: No      HDL Cholesterol: 29 mg/dL      Total Cholesterol: 184 mg/dL    orders and follow up as documented in Epic[facility], reviewed diet, exercise and weight control, recommended sodium restriction, very strongly urged to quit smoking to reduce cardiovascular risk, copy of written low fat low cholesterol diet provided and reviewed, cardiovascular risk and specific lipid/LDL goals reviewed, reviewed medications and side effects in detail.    20. Pre-diabetes  - A1C FUTURE 6mo; Future    Patient has been advised of split billing requirements and indicates understanding: Yes  COUNSELING:  Reviewed preventive health counseling, as reflected in patient instructions       Regular exercise       Healthy diet/nutrition       Vision screening       Hearing screening       Osteoporosis prevention/bone health       Colon cancer screening       Consider Hep C screening for all patients one time for ages 18-79 years       One time pneumovax for smokers       Advance Care Planning    Estimated body mass index is 42.91 kg/m  as calculated from the following:    Height as of this encounter: 1.51 m (4' 11.45\").    Weight as of this encounter: 97.8 kg (215 lb 11.2 oz).    Weight management plan: Discussed healthy diet and exercise " guidelines    She reports that she has been smoking cigarettes. She has a 7.50 pack-year smoking history. She has never used smokeless tobacco.  Tobacco Cessation Action Plan:   Information offered: Patient not interested at this time      Counseling Resources:  ATP IV Guidelines  Pooled Cohorts Equation Calculator  Breast Cancer Risk Calculator  BRCA-Related Cancer Risk Assessment: FHS-7 Tool  FRAX Risk Assessment  ICSI Preventive Guidelines  Dietary Guidelines for Americans, 2010  USDA's MyPlate  ASA Prophylaxis  Lung CA Screening    Roshan Pelayo MD  [facility]

## 2021-07-08 ASSESSMENT — ANXIETY QUESTIONNAIRES: GAD7 TOTAL SCORE: 13

## 2021-07-08 ASSESSMENT — ASTHMA QUESTIONNAIRES: ACT_TOTALSCORE: 19

## 2021-08-04 ENCOUNTER — MYC MEDICAL ADVICE (OUTPATIENT)
Dept: FAMILY MEDICINE | Facility: CLINIC | Age: 51
End: 2021-08-04

## 2021-08-15 ENCOUNTER — OFFICE VISIT (OUTPATIENT)
Dept: URGENT CARE | Facility: URGENT CARE | Age: 51
End: 2021-08-15
Payer: COMMERCIAL

## 2021-08-15 VITALS
OXYGEN SATURATION: 99 % | DIASTOLIC BLOOD PRESSURE: 68 MMHG | HEART RATE: 77 BPM | TEMPERATURE: 97.4 F | SYSTOLIC BLOOD PRESSURE: 128 MMHG

## 2021-08-15 DIAGNOSIS — R07.0 THROAT PAIN: ICD-10-CM

## 2021-08-15 DIAGNOSIS — J06.9 VIRAL URI: Primary | ICD-10-CM

## 2021-08-15 LAB — DEPRECATED S PYO AG THROAT QL EIA: NEGATIVE

## 2021-08-15 PROCEDURE — 87651 STREP A DNA AMP PROBE: CPT | Performed by: PHYSICIAN ASSISTANT

## 2021-08-15 PROCEDURE — 99213 OFFICE O/P EST LOW 20 MIN: CPT | Performed by: PHYSICIAN ASSISTANT

## 2021-08-15 PROCEDURE — U0003 INFECTIOUS AGENT DETECTION BY NUCLEIC ACID (DNA OR RNA); SEVERE ACUTE RESPIRATORY SYNDROME CORONAVIRUS 2 (SARS-COV-2) (CORONAVIRUS DISEASE [COVID-19]), AMPLIFIED PROBE TECHNIQUE, MAKING USE OF HIGH THROUGHPUT TECHNOLOGIES AS DESCRIBED BY CMS-2020-01-R: HCPCS | Performed by: PHYSICIAN ASSISTANT

## 2021-08-15 PROCEDURE — U0005 INFEC AGEN DETEC AMPLI PROBE: HCPCS | Performed by: PHYSICIAN ASSISTANT

## 2021-08-16 LAB
GROUP A STREP BY PCR: NOT DETECTED
SARS-COV-2 RNA RESP QL NAA+PROBE: NEGATIVE

## 2021-08-16 NOTE — PATIENT INSTRUCTIONS
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
## 2021-08-31 ENCOUNTER — TELEPHONE (OUTPATIENT)
Dept: FAMILY MEDICINE | Facility: CLINIC | Age: 51
End: 2021-08-31

## 2021-08-31 NOTE — TELEPHONE ENCOUNTER
Patient has been dizzy since she started the crestor.    She stopped taking this for the last 2 days and this has helped.    Patient has not noticed anything that makes it worse.    She states she is in a meeting and needs to go.    Patient hung up her phone.    Patient has a telephone visit tomorrow.    Madie Chavarria RN on 8/31/2021 at 3:46 PM

## 2021-08-31 NOTE — PROGRESS NOTES
ALBERTO is a 50 year old who is being evaluated via a billable telephone visit.      What phone number would you like to be contacted at? [phone]  How would you like to obtain your AVS? MyChart    Assessment & Plan     Hyperlipidemia LDL goal <100  Trial of 10 mg dose of rosuvastatin due to intolerance and subjective complaints of vertigo    Morbid obesity (H)  Weight management plan: Discussed healthy diet and exercise guidelines      Alpha-thalassemia (H)  Stable.    30  minutes spent on the date of the encounter doing chart review, history and exam, documentation and further activities per the note      No follow-ups on file.    Roshan Pelayo MD  [facility] MATHEWDONAVON DOMINGUEZ is a 50 year old who presents for the following health issues  accompanied by her Self:    HPI     Hyperlipidemia Follow-Up      Are you regularly taking any medication or supplement to lower your cholesterol?   Yes- Rsouvastatin 20 mg    Are you having muscle aches or other side effects that you think could be caused by your cholesterol lowering medication?  No      How many servings of fruits and vegetables do you eat daily?  2-3    On average, how many sweetened beverages do you drink each day (Examples: soda, juice, sweet tea, etc.  Do NOT count diet or artificially sweetened beverages)?   0    How many days per week do you exercise enough to make your heart beat faster? 3 or less    How many minutes a day do you exercise enough to make your heart beat faster? 10 - 19  How many days per week do you miss taking your medication? 2    What makes it hard for you to take your medications?  side effects      Review of Systems   Constitutional, HEENT, cardiovascular, pulmonary, GI, , musculoskeletal, neuro, skin, endocrine and psych systems are negative, except as otherwise noted.      Objective           Vitals:  No vitals were obtained today due to virtual visit.    Physical Exam   healthy, alert and no  distress  PSYCH: Alert and oriented times 3; coherent speech, normal   rate and volume, able to articulate logical thoughts, able   to abstract reason, no tangential thoughts, no hallucinations   or delusions  Her affect is normal  RESP: No cough, no audible wheezing, able to talk in full sentences  Remainder of exam unable to be completed due to telephone visits            Phone call duration: 25  minutes

## 2021-08-31 NOTE — TELEPHONE ENCOUNTER
Patient Quality Outreach Summary      Summary:    Patient is due/failing the following:   ACT needed    Type of outreach:    spoke to patient and she will complete on my chart  Made visit to discuss medications and causing dizzy spells. Patient states she has fallen a few times due to her dizziness. Patient has telephone visit tomorrow at 3.  Questions for provider review:    None                                                                                                                    Haleigh Hairston CMA       Chart routed to Care Team.

## 2021-09-01 ENCOUNTER — VIRTUAL VISIT (OUTPATIENT)
Dept: FAMILY MEDICINE | Facility: CLINIC | Age: 51
End: 2021-09-01
Payer: COMMERCIAL

## 2021-09-01 DIAGNOSIS — E78.5 HYPERLIPIDEMIA LDL GOAL <100: Primary | ICD-10-CM

## 2021-09-01 DIAGNOSIS — D56.0 ALPHA-THALASSEMIA (H): ICD-10-CM

## 2021-09-01 DIAGNOSIS — E66.01 MORBID OBESITY (H): ICD-10-CM

## 2021-09-01 PROCEDURE — 99214 OFFICE O/P EST MOD 30 MIN: CPT | Mod: 95 | Performed by: FAMILY MEDICINE

## 2021-09-01 RX ORDER — ROSUVASTATIN CALCIUM 10 MG/1
5 TABLET, COATED ORAL DAILY
COMMUNITY
Start: 2021-09-01 | End: 2022-02-15

## 2021-09-04 ENCOUNTER — HEALTH MAINTENANCE LETTER (OUTPATIENT)
Age: 51
End: 2021-09-04

## 2021-09-09 ENCOUNTER — LAB (OUTPATIENT)
Dept: URGENT CARE | Facility: URGENT CARE | Age: 51
End: 2021-09-09
Attending: PHYSICIAN ASSISTANT
Payer: COMMERCIAL

## 2021-09-09 ENCOUNTER — E-VISIT (OUTPATIENT)
Dept: URGENT CARE | Facility: URGENT CARE | Age: 51
End: 2021-09-09
Payer: COMMERCIAL

## 2021-09-09 DIAGNOSIS — Z20.822 SUSPECTED COVID-19 VIRUS INFECTION: ICD-10-CM

## 2021-09-09 DIAGNOSIS — Z20.822 SUSPECTED COVID-19 VIRUS INFECTION: Primary | ICD-10-CM

## 2021-09-09 LAB — SARS-COV-2 RNA RESP QL NAA+PROBE: NEGATIVE

## 2021-09-09 PROCEDURE — U0005 INFEC AGEN DETEC AMPLI PROBE: HCPCS

## 2021-09-09 PROCEDURE — U0003 INFECTIOUS AGENT DETECTION BY NUCLEIC ACID (DNA OR RNA); SEVERE ACUTE RESPIRATORY SYNDROME CORONAVIRUS 2 (SARS-COV-2) (CORONAVIRUS DISEASE [COVID-19]), AMPLIFIED PROBE TECHNIQUE, MAKING USE OF HIGH THROUGHPUT TECHNOLOGIES AS DESCRIBED BY CMS-2020-01-R: HCPCS

## 2021-09-09 PROCEDURE — 99421 OL DIG E/M SVC 5-10 MIN: CPT | Performed by: PHYSICIAN ASSISTANT

## 2021-09-09 NOTE — PATIENT INSTRUCTIONS
Dear Jessie Seymour,    Your symptoms show that you may have coronavirus (COVID-19). This illness can cause fever, cough and trouble breathing. Many people get a mild case and get better on their own. Some people can get very sick.    Will I be tested for COVID-19?  We would like to test you for Covid-19 virus. I have placed orders for this test.     To schedule: go to your [employer] home page and scroll down to the section that says  You have an appointment that needs to be scheduled  and click the large green button that says  Schedule Now  and follow the steps to find the next available openings.    If you are unable to complete these [employer] scheduling steps, please call [phone] to schedule your testing.     Return to work/school/ guidance:  Please let your workplace manager and staffing office know when your quarantine ends     We can t give you an exact date as it depends on the above. You can calculate this on your own or work with your manager/staffing office to calculate this. (For example if you were exposed on 10/4, you would have to quarantine for 14 full days. That would be through 10/18. You could return on 10/19.)      If you receive a positive COVID-19 test result, follow the guidance of the those who are giving you the results. Usually the return to work is 10 (or in some cases 20 days from symptom onset.) If you work at [facility], you must also be cleared by Employee Occupational Health and Safety to return to work.        If you receive a negative COVID-19 test result and did not have a high risk exposure to someone with a known positive COVID-19 test, you can return to work once you're free of fever for 24 hours without fever-reducing medication and your symptoms are improving or resolved.      If you receive a negative COVID-19 test and If you had a high risk exposure to someone who has tested positive for COVID-19 then you can return to work 14 days after your last contact  with the positive individual    Note: If you have ongoing exposure to the covid positive person, this quarantine period may be more than 14 days. (For example, if you are continued to be exposed to your child who tested positive and cannot isolate from them, then the quarantine of 7-14 days can't start until your child is no longer contagious. This is typically 10 days from onset of the child's symptoms. So the total duration may be 17-24 days in this case.)    Sign up for [employer].   We know it's scary to hear that you might have COVID-19. We want to track your symptoms to make sure you're okay over the next 2 weeks. Please look for an email from [employer]--this is a free, online program that we'll use to keep in touch. To sign up, follow the link in the email you will receive. Learn more at [email or web address]    How can I take care of myself?    Get lots of rest. Drink extra fluids (unless a doctor has told you not to)    Take Tylenol (acetaminophen) or ibuprofen for fever or pain. If you have liver or kidney problems, ask your family doctor if it's okay to take Tylenol o ibuprofen    If you have other health problems (like cancer, heart failure, an organ transplant or severe kidney disease): Call your specialty clinic if you don't feel better in the next 2 days.    Know when to call 911. Emergency warning signs include:  o Trouble breathing or shortness of breath  o Pain or pressure in the chest that doesn't go away  o Feeling confused like you haven't felt before, or not being able to wake up  o Bluish-colored lips or face    Where can I get more information?  [facility] - About COVID-19:   [email or web address]    CDC - What to Do If You're Sick:   www.cdc.gov/coronavirus/2019-ncov/about/steps-when-sick.html    September 9, 2021  RE:  Jessie Seymour                                                                                                                  [address]  [facility] [address]      To whom it may concern:    I evaluated Jessie Seymour on September 9, 2021. Jessie Seymour should be excused from work/school.     They should let their workplace manager and staffing office know when their quarantine ends.    We can not give an exact date as it depends on the information below. They can calculate this on their own or work with their manager/staffing office to calculate this. (For example if they were exposed on 10/04, they would have to quarantine for 14 full days. That would be through 10/18. They could return on 10/19.)    Quarantine Guidelines:      If patient receives a positive COVID-19 test result, they should follow the guidance of those who are giving the results. Usually the return to work is 10 (or in some cases 20 days from symptom onset.) If they work at [employer], they must be cleared by Employee Occupational Health and Safety to return to work.        If patient receives a negative COVID-19 test result and did not have a high risk exposure to someone with a known positive COVID-19 test, they can return to work once they're free of fever for 24 hours without fever-reducing medication and their symptoms are improving or resolved.      If patient receives a negative COVID-19 test and if they had a high risk exposure to someone who has tested positive for COVID-19 then they can return to work 14 days after their last contact with the positive individual    Note: If there is ongoing exposure to the covid positive person, this quarantine period may be longer than 14 days. (For example, if they are continually exposed to their child, who tested positive and cannot isolate from them, then the quarantine of 7-14 days can't start until their child is no longer contagious. This is typically 10 days from onset to the child's symptoms. So the total duration may be 17-24 days in this case.)     Sincerely,  Jorge Yun PA-C

## 2021-09-14 ENCOUNTER — LAB (OUTPATIENT)
Dept: URGENT CARE | Facility: URGENT CARE | Age: 51
End: 2021-09-14
Attending: INTERNAL MEDICINE
Payer: COMMERCIAL

## 2021-09-14 ENCOUNTER — E-VISIT (OUTPATIENT)
Dept: URGENT CARE | Facility: CLINIC | Age: 51
End: 2021-09-14
Payer: COMMERCIAL

## 2021-09-14 DIAGNOSIS — Z20.822 SUSPECTED COVID-19 VIRUS INFECTION: Primary | ICD-10-CM

## 2021-09-14 DIAGNOSIS — Z20.822 SUSPECTED COVID-19 VIRUS INFECTION: ICD-10-CM

## 2021-09-14 LAB — SARS-COV-2 RNA RESP QL NAA+PROBE: NEGATIVE

## 2021-09-14 PROCEDURE — U0003 INFECTIOUS AGENT DETECTION BY NUCLEIC ACID (DNA OR RNA); SEVERE ACUTE RESPIRATORY SYNDROME CORONAVIRUS 2 (SARS-COV-2) (CORONAVIRUS DISEASE [COVID-19]), AMPLIFIED PROBE TECHNIQUE, MAKING USE OF HIGH THROUGHPUT TECHNOLOGIES AS DESCRIBED BY CMS-2020-01-R: HCPCS

## 2021-09-14 PROCEDURE — U0005 INFEC AGEN DETEC AMPLI PROBE: HCPCS

## 2021-09-14 PROCEDURE — 99421 OL DIG E/M SVC 5-10 MIN: CPT | Performed by: INTERNAL MEDICINE

## 2021-09-14 NOTE — PATIENT INSTRUCTIONS
Dear Jessie Seymour,    Your symptoms show that you may have coronavirus (COVID-19). This illness can cause fever, cough and trouble breathing. Many people get a mild case and get better on their own. Some people can get very sick.    Will I be tested for COVID-19?  We would like to test you for Covid-19 virus. I have placed orders for this test.     For all employees or close contacts (except [employer] and [employer] - see below), go to your [employer] home page and scroll down to the section that says  You have an appointment that needs to be scheduled  and click the large green button that says  Schedule Now  and follow the steps to find the next available opening.     If you are unable to complete these steps or if you cannot find any available times, please call [phone] to schedule employee testing.     [employer] employees or close contacts, please call [phone].   [place] ([employer]) employees or close contacts call [phone].    Return to work/school/ guidance:  Please let your workplace manager and staffing office know when your quarantine ends     We can t give you an exact date as it depends on the above. You can calculate this on your own or work with your manager/staffing office to calculate this. (For example if you were exposed on 10/4, you would have to quarantine for 14 full days. That would be through 10/18. You could return on 10/19.)      If you receive a positive COVID-19 test result, follow the guidance of the those who are giving you the results. Usually the return to work is 10 (or in some cases 20 days from symptom onset.) If you work at [employer], you must also be cleared by Employee Occupational Health and Safety to return to work.        If you receive a negative COVID-19 test result and did not have a high risk exposure to someone with a known positive COVID-19 test, you can return to work once you're free of fever for 24 hours without fever-reducing medication and  your symptoms are improving or resolved.      If you receive a negative COVID-19 test and If you had a high risk exposure to someone who has tested positive for COVID-19 then you can return to work 14 days after your last contact with the positive individual    Note: If you have ongoing exposure to the covid positive person, this quarantine period may be more than 14 days. (For example, if you are continued to be exposed to your child who tested positive and cannot isolate from them, then the quarantine of 7-14 days can't start until your child is no longer contagious. This is typically 10 days from onset of the child's symptoms. So the total duration may be 17-24 days in this case.)    Sign up for [employer].   We know it's scary to hear that you might have COVID-19. We want to track your symptoms to make sure you're okay over the next 2 weeks. Please look for an email from [employer]--this is a free, online program that we'll use to keep in touch. To sign up, follow the link in the email you will receive. Learn more at [email or web address]    How can I take care of myself?    Get lots of rest. Drink extra fluids (unless a doctor has told you not to)    Take Tylenol (acetaminophen) or ibuprofen for fever or pain. If you have liver or kidney problems, ask your family doctor if it's okay to take Tylenol o ibuprofen    If you have other health problems (like cancer, heart failure, an organ transplant or severe kidney disease): Call your specialty clinic if you don't feel better in the next 2 days.    Know when to call 911. Emergency warning signs include:  o Trouble breathing or shortness of breath  o Pain or pressure in the chest that doesn't go away  o Feeling confused like you haven't felt before, or not being able to wake up  o Bluish-colored lips or face    Where can I get more information?   [employer] - About COVID-19:   [email or web address]    CDC - What to Do If You're Sick:    www.cdc.gov/coronavirus/2019-ncov/about/steps-when-sick.html    September 14, 2021  RE:  Jessie Seymour                                                                                                                  [address] [facility] [place] [address]      To whom it may concern:    I evaluated Jessie Seymour on September 14, 2021. Jessie Seymour should be excused from work/school.     They should let their workplace manager and staffing office know when their quarantine ends.    We can not give an exact date as it depends on the information below. They can calculate this on their own or work with their manager/staffing office to calculate this. (For example if they were exposed on 10/04, they would have to quarantine for 14 full days. That would be through 10/18. They could return on 10/19.)    Quarantine Guidelines:      If patient receives a positive COVID-19 test result, they should follow the guidance of those who are giving the results. Usually the return to work is 10 (or in some cases 20 days from symptom onset.) If they work at [employer], they must be cleared by Employee Occupational Health and Safety to return to work.        If patient receives a negative COVID-19 test result and did not have a high risk exposure to someone with a known positive COVID-19 test, they can return to work once they're free of fever for 24 hours without fever-reducing medication and their symptoms are improving or resolved.      If patient receives a negative COVID-19 test and if they had a high risk exposure to someone who has tested positive for COVID-19 then they can return to work 14 days after their last contact with the positive individual    Note: If there is ongoing exposure to the covid positive person, this quarantine period may be longer than 14 days. (For example, if they are continually exposed to their child, who tested positive and cannot isolate from them, then the quarantine of 7-14  days can't start until their child is no longer contagious. This is typically 10 days from onset to the child's symptoms. So the total duration may be 17-24 days in this case.)     Sincerely,  Sara Brennan MD

## 2021-09-24 ENCOUNTER — OFFICE VISIT (OUTPATIENT)
Dept: URGENT CARE | Facility: URGENT CARE | Age: 51
End: 2021-09-24
Payer: COMMERCIAL

## 2021-09-24 VITALS
DIASTOLIC BLOOD PRESSURE: 80 MMHG | HEART RATE: 92 BPM | RESPIRATION RATE: 22 BRPM | SYSTOLIC BLOOD PRESSURE: 152 MMHG | OXYGEN SATURATION: 98 % | TEMPERATURE: 99.9 F

## 2021-09-24 DIAGNOSIS — J20.9 ACUTE BRONCHITIS WITH SYMPTOMS > 10 DAYS: Primary | ICD-10-CM

## 2021-09-24 DIAGNOSIS — J45.41 MODERATE PERSISTENT ASTHMA WITH EXACERBATION: ICD-10-CM

## 2021-09-24 PROCEDURE — 99214 OFFICE O/P EST MOD 30 MIN: CPT | Performed by: PHYSICIAN ASSISTANT

## 2021-09-24 RX ORDER — PREDNISONE 20 MG/1
60 TABLET ORAL DAILY
Qty: 15 TABLET | Refills: 0 | Status: SHIPPED | OUTPATIENT
Start: 2021-09-24 | End: 2021-09-29

## 2021-09-24 RX ORDER — CODEINE PHOSPHATE AND GUAIFENESIN 10; 100 MG/5ML; MG/5ML
1 SOLUTION ORAL EVERY 6 HOURS PRN
Qty: 118 ML | Refills: 0 | Status: SHIPPED | OUTPATIENT
Start: 2021-09-24 | End: 2022-02-15

## 2021-09-24 RX ORDER — BENZONATATE 200 MG/1
200 CAPSULE ORAL 3 TIMES DAILY PRN
Qty: 20 CAPSULE | Refills: 0 | Status: SHIPPED | OUTPATIENT
Start: 2021-09-24 | End: 2022-04-29

## 2021-09-24 ASSESSMENT — ENCOUNTER SYMPTOMS
COUGH: 1
HEADACHES: 0
MUSCULOSKELETAL NEGATIVE: 1
EYES NEGATIVE: 1
FEVER: 0
JOINT SWELLING: 0
SHORTNESS OF BREATH: 0
VOMITING: 0
BACK PAIN: 0
SORE THROAT: 0
WEAKNESS: 0
CHILLS: 0
ARTHRALGIAS: 0
NAUSEA: 0
ENDOCRINE NEGATIVE: 1
LIGHT-HEADEDNESS: 0
SINUS PRESSURE: 1
MYALGIAS: 0
CARDIOVASCULAR NEGATIVE: 1
RHINORRHEA: 0
WOUND: 0
DIZZINESS: 0
CHEST TIGHTNESS: 1
DIARRHEA: 0
PALPITATIONS: 0
NECK PAIN: 0
ALLERGIC/IMMUNOLOGIC NEGATIVE: 1
NECK STIFFNESS: 0

## 2021-09-24 NOTE — PROGRESS NOTES
Chief Complaint:     Chief Complaint   Patient presents with     URI     2 days ago SOB, Chest tightness, hot/cold nasel congetion is bloddy       No results found for any visits on 09/24/21.    Medical Decision Making:    Vital signs reviewed by Jorge Yun PA-C  BP (!) 152/80 (BP Location: Right arm, Patient Position: Sitting, Cuff Size: Adult Large)   Pulse 92   Temp 99.9  F (37.7  C) (Tympanic)   Resp 22   LMP 06/22/2016 (Exact Date)   SpO2 98%     Differential Diagnosis:  URI Adult/Peds:  Bronchitis-viral, Influenza, Pneumonia, Sinusitis, Tonsilitis, Viral pharyngitis, Viral syndrome and Viral upper respiratory illness        ASSESSMENT    1. Acute bronchitis with symptoms > 10 days    2. Moderate persistent asthma with exacerbation        PLAN    Patient is in no acute distress.    Temp is 99.9 in clinic today, lung sounds were clear, and O2 sats at 98% on RA.    COVID tests on 9/9 and 9/14 were negative.  Patient declined refill of asthma medication tonight.  Rx for Prednisone sent in.  With length of symptoms, Rx for Augmentin tonight.  Rest, Push fluids, vaporizer, elevation of head of bed.  Ibuprofen and or Tylenol for any fever or body aches.  Rx for Robitussin AC cough suppressant sent in.   If symptoms worsen, recheck immediately otherwise follow up with your PCP in 1 week if symptoms are not improving.  Worrisome symptoms discussed with instructions to go to the ED.  Patient verbalized understanding and agreed with this plan.    Labs:    No results found for any visits on 09/24/21.     Vital signs reviewed by Jorge Yun PA-C  BP (!) 152/80 (BP Location: Right arm, Patient Position: Sitting, Cuff Size: Adult Large)   Pulse 92   Temp 99.9  F (37.7  C) (Tympanic)   Resp 22   LMP 06/22/2016 (Exact Date)   SpO2 98%     Current Meds      Current Outpatient Medications:      acetaminophen (TYLENOL) 325 MG tablet, Take 325-650 mg by mouth, Disp: , Rfl:      albuterol (PROAIR HFA) 108 (90  Base) MCG/ACT inhaler, Inhale 2 puffs into the lungs every 4 hours as needed for shortness of breath / dyspnea or wheezing, Disp: 18 g, Rfl: 3     albuterol (PROVENTIL) (2.5 MG/3ML) 0.083% neb solution, Take 1 vial (2.5 mg) by nebulization every 6 hours as needed for shortness of breath / dyspnea, Disp: 3 mL, Rfl: 3     amoxicillin-clavulanate (AUGMENTIN) 875-125 MG tablet, Take 1 tablet by mouth 2 times daily for 10 days, Disp: 20 tablet, Rfl: 0     aspirin 81 MG tablet, Take 81 mg by mouth daily , Disp: , Rfl:      benzonatate (TESSALON) 200 MG capsule, Take 1 capsule (200 mg) by mouth 3 times daily as needed for cough, Disp: 20 capsule, Rfl: 0     camphor-menthol (SARNA) 0.5-0.5 % external lotion, Apply 1 mL topically every 6 hours as needed for skin care Apply to palms of hands 2-3 times daily, Disp: 59 mL, Rfl: 1     clonazePAM (KLONOPIN) 0.5 MG tablet, Take 0.5-1 tablets (0.25-0.5 mg) by mouth nightly as needed for anxiety or sleep, Disp: 45 tablet, Rfl: 1     diphenhydrAMINE (BENADRYL) 25 MG tablet, Take 1-2 tablets (25-50 mg) by mouth every 6 hours as needed for itching or allergies, Disp: 60 tablet, Rfl: 1     fluticasone (FLONASE) 50 MCG/ACT nasal spray, Spray 1-2 sprays into both nostrils daily, Disp: 1 g, Rfl: 11     guaiFENesin-codeine (ROBITUSSIN AC) 100-10 MG/5ML solution, Take 5 mLs by mouth every 6 hours as needed for cough, Disp: 118 mL, Rfl: 0     hydrocortisone valerate (WEST-LESLY) 0.2 % external ointment, Apply sparingly to affected area three times daily as needed., Disp: 60 g, Rfl: 0     ibuprofen (ADVIL/MOTRIN) 600 MG tablet, Take 1 tablet (600 mg) by mouth every 6 hours as needed for pain Take w/ food, Disp: 30 tablet, Rfl: 0     ipratropium - albuterol 0.5 mg/2.5 mg/3 mL (DUONEB) 0.5-2.5 (3) MG/3ML neb solution, Take 1 vial (3 mLs) by nebulization every 6 hours as needed for shortness of breath / dyspnea or wheezing, Disp: 3 mL, Rfl: 1     ketorolac (TORADOL) 10 MG tablet, Take 1 tablet (10  mg) by mouth every 6 hours as needed for pain, Disp: 4 tablet, Rfl: 0     melatonin 3 MG tablet, Take 3 mg by mouth, Disp: , Rfl:      nortriptyline (PAMELOR) 10 MG capsule, Take 1 capsule (10 mg) by mouth At Bedtime Every 3 days, Disp: 30 capsule, Rfl: 1     omeprazole (PRILOSEC) 10 MG DR capsule, Take 10 mg by mouth daily as needed , Disp: , Rfl:      predniSONE (DELTASONE) 10 MG tablet, Take 10 mg by mouth daily Uses for asthma as needed, Disp: , Rfl:      predniSONE (DELTASONE) 20 MG tablet, Take 3 tablets (60 mg) by mouth daily for 5 days, Disp: 15 tablet, Rfl: 0     rosuvastatin (CRESTOR) 10 MG tablet, Take 1 tablet (10 mg) by mouth daily, Disp: , Rfl:       Respiratory History    occasional episodes of bronchitis, pneumonia and asthma      SUBJECTIVE    HPI: Jessie Seymour is an 50 year old female who presents with chest congestion, cough nonproductive, occasional, facial pain/pressure, chest tightness, nasal congestion and nasal discharge clear.  Symptoms began 2  weeks ago and has gradually worsening.  There is no shortness of breath, wheezing and chest pain.  Patient is eating and drinking well.  No fever, nausea, vomiting, or diarrhea.    Patient denies any recent travel or exposure to known COVID positive tested individual.      ROS:     Review of Systems   Constitutional: Negative for chills and fever.   HENT: Positive for congestion and sinus pressure. Negative for ear pain, rhinorrhea and sore throat.    Eyes: Negative.    Respiratory: Positive for cough and chest tightness. Negative for shortness of breath.    Cardiovascular: Negative.  Negative for chest pain and palpitations.   Gastrointestinal: Negative for diarrhea, nausea and vomiting.   Endocrine: Negative.    Genitourinary: Negative.    Musculoskeletal: Negative.  Negative for arthralgias, back pain, joint swelling, myalgias, neck pain and neck stiffness.   Skin: Negative.  Negative for rash and wound.   Allergic/Immunologic: Negative.   Negative for immunocompromised state.   Neurological: Negative for dizziness, weakness, light-headedness and headaches.         Family History   Family History   Problem Relation Age of Onset     Cardiovascular Mother         long QT syndrom     Heart Disease Mother         entire family has heart problems of some type     Hypertension Mother      Depression Mother      Breast Cancer Mother      Thyroid Disease Mother      Osteoporosis Mother      Thyroid Disease Mother      Dementia Mother      Heart Disease Father         not sure about details, had stroke     Hypertension Father      Heart Disease Brother         CP Hypertension-7 brothers-5 alive right now      Hypertension Brother      Heart Disease Brother         CP Hypertension     Genetic Disorder Brother      Heart Disease Sister         stroke      Diabetes Sister      Heart Disease Brother         long QT syndrome- at age of 16     Heart Disease Maternal Grandmother      Hypertension Maternal Grandmother      Heart Disease Maternal Grandfather      Hypertension Maternal Grandfather      Diabetes Sister         6 sisters     Musculoskeletal Disorder Sister      Genetic Disorder Sister         lupus     Hypertension Sister      Diabetes Maternal Uncle      Hypertension Sister      Cerebrovascular Disease Sister      Hypertension Brother      Hypertension Paternal Grandmother      Hypertension Paternal Grandfather      Hypertension Sister      Asthma Sister      Allergies Other         most everyone in family has some type of allergy     Asthma Other      Cancer Other 40        one maternal cousin with bca in 50s, one paternal cousin with bca in 40s     Cerebrovascular Disease Sister      Osteoporosis Sister      Gynecology Sister         3 sisters with PCOS     Blood Disease Sister         trade for sickle cell anemia     Diabetes Sister      Hypertension Sister      Cerebrovascular Disease Sister      Depression Sister      Asthma Sister       Osteoporosis Sister      Genetic Disorder Sister      Diabetes Other      Hypertension Cousin      Other Cancer Cousin      Breast Cancer Cousin      Other Cancer Other      Asthma Nephew      Thyroid Disease Other      Cancer - colorectal No family hx of      Long QT syndrome Mother      Acute Myocardial Infarction Mother      Cerebrovascular Disease Father      Acute Myocardial Infarction Sister      Cerebrovascular Disease Sister      Hypertension Sister      Long QT syndrome Brother      Acute Myocardial Infarction Sister      Cerebrovascular Disease Sister      Hypertension Sister      Pulmonary Hypertension Brother         Problem history  Patient Active Problem List   Diagnosis     iamLUMBAGO     iamMV COLLISION NOS-     MEDICAL HISTORY OF - VERY DIFFICULT LAB DRAW and IV START     Allergic rhinitis     Tobacco use disorder     Family history of breast cancer     Obesity     Intermittent asthma     Migraine headache     Melasma     Burping     Hiatal hernia     Smoker     Alpha-thalassemia (H)     Moderate aortic regurgitation     Snoring     Bartholin's cyst     Diverticulitis of colon     Mild major depression (H)     Morbid obesity (H)     Hyperlipidemia LDL goal <100     RBC microcytosis     Pre-diabetes        Allergies  Allergies   Allergen Reactions     Loratadine Other (See Comments)     Dries her up and gets rare sinus infection     Morphine Itching     PN: LW Reaction: Itching, Pruritis     Oxycodone Nausea and Vomiting and Itching     Other reaction(s): Diaphoresis  Other reaction(s): Diaphoresis     Sumatriptan      Worsens migraine     Zofran [Ondansetron]      CARDIO recommended never to take this medication      Codeine Anxiety     Becomes tearful     Hydrocodone-Acetaminophen Anxiety        Social History  Social History     Socioeconomic History     Marital status: Single     Spouse name: Not on file     Number of children: Not on file     Years of education: Not on file     Highest  education level: Not on file   Occupational History     Not on file   Tobacco Use     Smoking status: Current Every Day Smoker     Packs/day: 0.50     Years: 15.00     Pack years: 7.50     Types: Cigarettes     Smokeless tobacco: Never Used     Tobacco comment: Pt. smokes appx. 6 cigarettes daily   Substance and Sexual Activity     Alcohol use: Yes     Alcohol/week: 0.0 standard drinks     Comment: Rarely     Drug use: No     Sexual activity: Yes     Partners: Male     Birth control/protection: Female Surgical     Comment:  using nothing for contraception    Other Topics Concern     Parent/sibling w/ CABG, MI or angioplasty before 65F 55M? 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 , one son- 7  yr old, one dog     Social Determinants of Health     Financial Resource Strain:      Difficulty of Paying Living Expenses:    Food Insecurity:      Worried About Running Out of Food in the Last Year:      Ran Out of Food in the Last Year:    Transportation Needs:      Lack of Transportation (Medical):      Lack of Transportation (Non-Medical):    Physical Activity:      Days of Exercise per Week:      Minutes of Exercise per Session:    Stress:      Feeling of Stress :    Social Connections:      Frequency of Communication with Friends and Family:      Frequency of Social Gatherings with Friends and Family:      Attends Yazdanism Services:      Active Member of Clubs or Organizations:      Attends Club or  Organization Meetings:      Marital Status:    Intimate Partner Violence:      Fear of Current or Ex-Partner:      Emotionally Abused:      Physically Abused:      Sexually Abused:         OBJECTIVE     Vital signs reviewed by Jorge Yun PA-C  BP (!) 152/80 (BP Location: Right arm, Patient Position: Sitting, Cuff Size: Adult Large)   Pulse 92   Temp 99.9  F (37.7  C) (Tympanic)   Resp 22   LMP 06/22/2016 (Exact Date)   SpO2 98%      Physical Exam  Vitals and nursing note reviewed.   Constitutional:       General: She is not in acute distress.     Appearance: She is well-developed. She is not ill-appearing, toxic-appearing or diaphoretic.   HENT:      Head: Normocephalic and atraumatic.      Right Ear: Hearing, tympanic membrane, ear canal and external ear normal. Tympanic membrane is not perforated, erythematous, retracted or bulging.      Left Ear: Hearing, tympanic membrane, ear canal and external ear normal. Tympanic membrane is not perforated, erythematous, retracted or bulging.      Nose: Congestion present. No mucosal edema or rhinorrhea.      Right Sinus: No maxillary sinus tenderness or frontal sinus tenderness.      Left Sinus: No maxillary sinus tenderness or frontal sinus tenderness.      Mouth/Throat:      Pharynx: No pharyngeal swelling, oropharyngeal exudate, posterior oropharyngeal erythema or uvula swelling.      Tonsils: No tonsillar exudate or tonsillar abscesses. 0 on the right. 0 on the left.   Eyes:      General:         Right eye: No discharge.         Left eye: No discharge.      Pupils: Pupils are equal, round, and reactive to light.   Cardiovascular:      Rate and Rhythm: Normal rate and regular rhythm.      Heart sounds: Normal heart sounds. No murmur heard.   No friction rub. No gallop.    Pulmonary:      Effort: Pulmonary effort is normal. No respiratory distress.      Breath sounds: Normal breath sounds. No decreased breath sounds, wheezing, rhonchi or rales.   Chest:       Chest wall: No tenderness.   Abdominal:      General: Bowel sounds are normal. There is no distension.      Palpations: Abdomen is soft. There is no mass.      Tenderness: There is no abdominal tenderness. There is no guarding.   Musculoskeletal:      Cervical back: Normal range of motion and neck supple.   Lymphadenopathy:      Head:      Right side of head: No submental, submandibular, tonsillar, preauricular or posterior auricular adenopathy.      Left side of head: No submental, submandibular, tonsillar, preauricular or posterior auricular adenopathy.      Cervical: No cervical adenopathy.      Right cervical: No superficial or posterior cervical adenopathy.     Left cervical: No superficial or posterior cervical adenopathy.   Skin:     General: Skin is warm and dry.      Findings: No rash.   Neurological:      Mental Status: She is alert and oriented to person, place, and time.      Cranial Nerves: No cranial nerve deficit.      Deep Tendon Reflexes: Reflexes are normal and symmetric.   Psychiatric:         Behavior: Behavior normal. Behavior is cooperative.         Thought Content: Thought content normal.         Judgment: Judgment normal.           Jorge Yun PA-C  9/24/2021, 6:32 PM

## 2021-09-24 NOTE — PATIENT INSTRUCTIONS
Patient Education     Bronchitis, Antibiotic Treatment (Adult)    Bronchitis is an infection of the air passages (bronchial tubes) in your lungs. It often occurs when you have a cold. This illness is contagious during the first few days and is spread through the air by coughing and sneezing, or by direct contact (touching the sick person and then touching your own eyes, nose, or mouth).  Symptoms of bronchitis include cough with mucus (phlegm) and low-grade fever. Bronchitis usually lasts 7 to 14 days. Mild cases can be treated with simple home remedies. More severe infection is treated with an antibiotic.  Home care  Follow these guidelines when caring for yourself at home:    If your symptoms are severe, rest at home for the first 2 to 3 days. When you go back to your usual activities, don't let yourself get too tired.    Don't smoke. Also stay away from secondhand smoke.    You may use over-the-counter medicines to control fever or pain, unless another medicine was prescribed. If you have chronic liver or kidney disease or have ever had a stomach ulcer or gastrointestinal bleeding, talk with your healthcare provider before using these medicines. Also talk to your provider if you are taking medicine to prevent blood clots. Aspirin should never be given to anyone younger than 18 who is ill with a viral infection or fever. It may cause severe liver or brain damage.    Your appetite may be low, so a light diet is fine. Stay well hydrated by drinking 6 to 8 glasses of fluids per day. This includes water, soft drinks, sports drinks, juices, tea, or soup. Extra fluids will help loosen mucus in your nose and lungs.    Over-the-counter cough, cold, and sore-throat medicines will not shorten the length of the illness, but they may be helpful to reduce your symptoms. Don't use decongestants if you have high blood pressure.    Finish all antibiotic medicine. Do this even if you are feeling better after only a few  days.  Follow-up care  Follow up with your healthcare provider, or as advised. If you had an X-ray or ECG (electrocardiogram), a specialist will review it. You will be told of any new test results that may affect your care.  If you are age 65 or older, if you smoke, or if you have a chronic lung disease or condition that affects your immune system, ask your healthcare provider about getting a pneumococcal vaccine and a yearly flu shot (influenza vaccine).  When to seek medical advice  Call your healthcare provider right away if any of these occur:    Fever of 100.4 F (38 C) or higher, or as directed by your healthcare provider    Coughing up more sputum    Weakness, drowsiness, headache, facial pain, ear pain, or a stiff neck  Call 911  Call 911 if any of these occur.    Coughing up blood    Weakness, drowsiness, headache, or stiff neck that get worse    Trouble breathing, wheezing, or pain with breathing  [employer] last reviewed this educational content on 6/1/2018 2000-2021 [employer]. All rights reserved. This information is not intended as a substitute for professional medical care. Always follow your healthcare professional's instructions.           Patient Education     Understanding Acute Rhinosinusitis  Acute rhinosinusitis is when the lining of the inside of the nose and the sinuses becomes irritated and swollen. It is also called sinusitis, or a sinus infection.  Sinuses are air-filled spaces in the skull behind the face. They are kept moist and clean by a lining of mucosa. Things such as pollen, smoke, and chemical fumes can irritate the mucosa. It can then swell up. As a response to irritation, the mucosa makes more mucus and other fluids. Tiny hairlike cilia cover the mucosa. Cilia help carry mucus toward the opening of the sinus. Too much mucus may cause the cilia to stop working. This blocks the sinus opening. A buildup of fluid in the sinuses then causes pain and pressure. It can  also cause bacteria to grow in the sinuses.    What causes acute rhinosinusitis?  A sinus infection is most often caused by a virus. You are more likely to get one after having a cold or the flu. In some cases, a sinus infection can be caused by bacteria.  You are at higher risk for a sinus infection if you:    Are older in age    Have structural problems with your sinuses    Smoke or are exposed to secondhand smoke    Are exposed to changes in pressure, such as from flying a lot or deep sea diving    Have asthma or allergies    Have a weak immune system    Have dental disease     Symptoms of acute rhinosinusitis  Symptoms of acute rhinosinusitis often last around 7 to 10 days. If you have a bacterial infection, they may last longer. They may also get better but then worsen. You may have:    Face pain or pressure under the eyes and around the nose    Headache    Fluid draining in the back of the throat (postnasal drip)    Congestion    Drainage that is thick and colored (often green), instead of clear    Cough    Problems with your sense of smell    Ear pain or hearing problems    Fever    Tooth pain    Fatigue  Diagnosing acute rhinosinusitis  Your healthcare provider will ask about your symptoms and past health. He or she will look at your ears, nose, throat, and sinuses. Imaging tests, such as X-rays, are often not needed.  It can be hard to figure out if a sinus infection is caused by a virus or bacterium. A bacterial infection tends to last longer. Symptoms may also get better but then worsen. Your healthcare provider may take a sample of mucus from your nose to check for bacteria.  Treating acute rhinosinusitis  Most sinus infections will go away within 10 days. Your body will fight off the virus. If your symptoms seem to get better but then worsen, you may have a bacterial infection instead. Your healthcare provider will then give you antibiotics. Take this medicine until it is gone, even if you feel  better.  To help ease your symptoms, your healthcare provider may advise:    Over-the-counter pain relievers. Medicines such as acetaminophen or ibuprofen can ease sinus pain. They may also lower a fever.    Nasal washes. Washing your nasal passages with salt water may ease pain and pressure. It can rinse out mucous and other irritants from your sinuses. Your healthcare provider can show you how to do it.    Nasal steroid spray. This prescription medicine can reduce inflammation in your sinuses.    Other medicines. Decongestants, antihistamines, and other nasal sprays may give short-term relief. They may help with congestion. Talk with your healthcare provider before taking these medicines.     Preventing acute rhinosinusitis  You can help prevent a sinus infection with these steps:    Wash your hands well and often.    Stay away from people who have a cold or upper respiratory infection.    Don't smoke. And stay away from secondhand smoke.    Use a humidifier at home.    Make sure you are up-to-date on your vaccines, such as the flu shot.     When to call your healthcare provider  Call your healthcare provider right away if you have any of these:    Fever of 100.4 F (38 C) or higher, or as directed by your healthcare provider    Pain that gets worse    Symptoms that don t get better, or get worse    New symptoms  [employer] last reviewed this educational content on 6/1/2019 2000-2021 [employer]. All rights reserved. This information is not intended as a substitute for professional medical care. Always follow your healthcare professional's instructions.

## 2021-09-30 ENCOUNTER — TELEPHONE (OUTPATIENT)
Dept: FAMILY MEDICINE | Facility: CLINIC | Age: 51
End: 2021-09-30

## 2021-09-30 DIAGNOSIS — B37.31 YEAST INFECTION OF THE VAGINA: Primary | ICD-10-CM

## 2021-09-30 RX ORDER — FLUCONAZOLE 150 MG/1
150 TABLET ORAL DAILY
Qty: 3 TABLET | Refills: 0 | Status: SHIPPED | OUTPATIENT
Start: 2021-09-30 | End: 2021-10-03

## 2021-09-30 NOTE — TELEPHONE ENCOUNTER
Patient called stating that she would like Jorge Yun to prescribe her diflucan because she's having yeast infection from the Amoxacillin. Patient would like a call back in regards to this message.  .[facility] Patient Registration

## 2021-10-05 RX ORDER — FLUCONAZOLE 150 MG/1
TABLET ORAL
Qty: 1 TABLET | Refills: 0 | Status: ON HOLD | OUTPATIENT
Start: 2021-10-05 | End: 2021-10-21

## 2021-10-05 NOTE — TELEPHONE ENCOUNTER
Pending Prescriptions:                       Disp   Refills    fluconazole (DIFLUCAN) 150 MG tablet [Phar*1 tabl*0        Sig: TAKE ONE TABLET BY MOUTH ONCE FOR ONE DOSE. TAKE IF           YEAST INFECTION DEVELOPS WITH ANTIBIOTIC USE.    Routing refill request to provider for review/approval because:  Drug not active on patient's medication list
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
## 2021-10-13 ENCOUNTER — OFFICE VISIT (OUTPATIENT)
Dept: DERMATOLOGY | Facility: CLINIC | Age: 51
End: 2021-10-13
Payer: COMMERCIAL

## 2021-10-13 DIAGNOSIS — B35.3 TINEA PEDIS OF BOTH FEET: ICD-10-CM

## 2021-10-13 DIAGNOSIS — H93.90 EAR LESION: ICD-10-CM

## 2021-10-13 DIAGNOSIS — L91.8 SKIN TAG: ICD-10-CM

## 2021-10-13 DIAGNOSIS — L30.1 DYSHIDROTIC ECZEMA: ICD-10-CM

## 2021-10-13 DIAGNOSIS — L71.9 ROSACEA: Primary | ICD-10-CM

## 2021-10-13 PROCEDURE — 86038 ANTINUCLEAR ANTIBODIES: CPT | Performed by: DERMATOLOGY

## 2021-10-13 PROCEDURE — 99204 OFFICE O/P NEW MOD 45 MIN: CPT | Performed by: DERMATOLOGY

## 2021-10-13 PROCEDURE — 36415 COLL VENOUS BLD VENIPUNCTURE: CPT | Performed by: DERMATOLOGY

## 2021-10-13 RX ORDER — MOMETASONE FUROATE 1 MG/G
CREAM TOPICAL
Qty: 50 G | Refills: 11 | Status: SHIPPED | OUTPATIENT
Start: 2021-10-13 | End: 2024-02-01

## 2021-10-13 RX ORDER — KETOCONAZOLE 20 MG/G
CREAM TOPICAL
Qty: 30 G | Refills: 1 | Status: SHIPPED | OUTPATIENT
Start: 2021-10-13 | End: 2023-07-11
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
## 2021-10-13 NOTE — NURSING NOTE
Jessie Seymour's goals for this visit include:   Chief Complaint   Patient presents with     Derm Problem     Bilateral feet concerns-itchy with bumps. Rash on face. Moles on neck. No personal hx of SC. Unknown family hx of SC.          She requests these members of her care team be copied on today's visit information:     PCP: Roshan Pelayo    Referring Provider:  Roshan Pelayo MD  [address] [place] [address]    [facility] 06/22/2016 (Exact Date)     Do you need any medication refills at today's visit? Denia Hanks on 10/13/2021 at 9:13 AM

## 2021-10-14 LAB — ANA SER QL IF: NEGATIVE

## 2021-10-15 ENCOUNTER — OFFICE VISIT (OUTPATIENT)
Dept: URGENT CARE | Facility: URGENT CARE | Age: 51
End: 2021-10-15
Payer: COMMERCIAL

## 2021-10-15 ENCOUNTER — ANCILLARY PROCEDURE (OUTPATIENT)
Dept: GENERAL RADIOLOGY | Facility: CLINIC | Age: 51
End: 2021-10-15
Attending: PHYSICIAN ASSISTANT
Payer: COMMERCIAL

## 2021-10-15 VITALS
TEMPERATURE: 98.9 F | SYSTOLIC BLOOD PRESSURE: 140 MMHG | DIASTOLIC BLOOD PRESSURE: 72 MMHG | OXYGEN SATURATION: 98 % | HEART RATE: 114 BPM

## 2021-10-15 DIAGNOSIS — R00.0 TACHYCARDIA: ICD-10-CM

## 2021-10-15 DIAGNOSIS — J45.901 EXACERBATION OF ASTHMA, UNSPECIFIED ASTHMA SEVERITY, UNSPECIFIED WHETHER PERSISTENT: ICD-10-CM

## 2021-10-15 DIAGNOSIS — I35.1 AORTIC VALVE INSUFFICIENCY, ETIOLOGY OF CARDIAC VALVE DISEASE UNSPECIFIED: ICD-10-CM

## 2021-10-15 DIAGNOSIS — D56.0 ALPHA-THALASSEMIA (H): ICD-10-CM

## 2021-10-15 DIAGNOSIS — R05.3 PERSISTENT COUGH: ICD-10-CM

## 2021-10-15 DIAGNOSIS — R06.02 SOB (SHORTNESS OF BREATH): ICD-10-CM

## 2021-10-15 DIAGNOSIS — R06.02 SOB (SHORTNESS OF BREATH): Primary | ICD-10-CM

## 2021-10-15 PROCEDURE — 93000 ELECTROCARDIOGRAM COMPLETE: CPT | Performed by: PHYSICIAN ASSISTANT

## 2021-10-15 PROCEDURE — 99215 OFFICE O/P EST HI 40 MIN: CPT | Performed by: PHYSICIAN ASSISTANT

## 2021-10-15 PROCEDURE — 71046 X-RAY EXAM CHEST 2 VIEWS: CPT | Performed by: RADIOLOGY

## 2021-10-15 RX ORDER — PREDNISONE 20 MG/1
20 TABLET ORAL DAILY
Status: CANCELLED | OUTPATIENT
Start: 2021-10-15

## 2021-10-15 RX ORDER — ALBUTEROL SULFATE 0.83 MG/ML
2.5 SOLUTION RESPIRATORY (INHALATION) EVERY 6 HOURS PRN
Qty: 3 ML | Refills: 3 | Status: SHIPPED | OUTPATIENT
Start: 2021-10-15

## 2021-10-15 RX ORDER — ALBUTEROL SULFATE 90 UG/1
2 AEROSOL, METERED RESPIRATORY (INHALATION) EVERY 4 HOURS PRN
Qty: 18 G | Refills: 3 | Status: SHIPPED | OUTPATIENT
Start: 2021-10-15 | End: 2023-07-11

## 2021-10-15 RX ORDER — IPRATROPIUM BROMIDE AND ALBUTEROL SULFATE 2.5; .5 MG/3ML; MG/3ML
1 SOLUTION RESPIRATORY (INHALATION) EVERY 6 HOURS PRN
Qty: 3 ML | Refills: 1 | Status: SHIPPED | OUTPATIENT
Start: 2021-10-15 | End: 2024-02-01
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
## 2021-10-19 PROBLEM — F32.9 MAJOR DEPRESSION: Status: ACTIVE | Noted: 2020-09-30

## 2021-10-20 ENCOUNTER — APPOINTMENT (OUTPATIENT)
Dept: CT IMAGING | Facility: CLINIC | Age: 51
End: 2021-10-20
Attending: EMERGENCY MEDICINE
Payer: COMMERCIAL

## 2021-10-20 ENCOUNTER — HOSPITAL ENCOUNTER (OUTPATIENT)
Facility: CLINIC | Age: 51
Setting detail: OBSERVATION
Discharge: HOME | End: 2021-10-21
Attending: EMERGENCY MEDICINE | Admitting: PHYSICIAN ASSISTANT
Payer: COMMERCIAL

## 2021-10-20 DIAGNOSIS — Z11.52 ENCOUNTER FOR SCREENING LABORATORY TESTING FOR SEVERE ACUTE RESPIRATORY SYNDROME CORONAVIRUS 2 (SARS-COV-2): ICD-10-CM

## 2021-10-20 DIAGNOSIS — J45.21 MILD INTERMITTENT ASTHMA WITH ACUTE EXACERBATION: Primary | ICD-10-CM

## 2021-10-20 DIAGNOSIS — K57.32 DIVERTICULITIS OF COLON: ICD-10-CM

## 2021-10-20 LAB
ALBUMIN SERPL-MCNC: 2.7 G/DL (ref 3.4–5)
ALBUMIN SERPL-MCNC: 3.1 G/DL (ref 3.4–5)
ALBUMIN UR-MCNC: NEGATIVE MG/DL
ALP SERPL-CCNC: 101 U/L (ref 40–150)
ALP SERPL-CCNC: 106 U/L (ref 40–150)
ALT SERPL W P-5'-P-CCNC: 18 U/L (ref 0–50)
ALT SERPL W P-5'-P-CCNC: 24 U/L (ref 0–50)
AMORPH CRY #/AREA URNS HPF: ABNORMAL /HPF
ANION GAP SERPL CALCULATED.3IONS-SCNC: 5 MMOL/L (ref 3–14)
ANION GAP SERPL CALCULATED.3IONS-SCNC: 8 MMOL/L (ref 3–14)
APPEARANCE UR: ABNORMAL
AST SERPL W P-5'-P-CCNC: 16 U/L (ref 0–45)
AST SERPL W P-5'-P-CCNC: 21 U/L (ref 0–45)
BASOPHILS # BLD AUTO: 0 10E3/UL (ref 0–0.2)
BASOPHILS # BLD AUTO: 0.1 10E3/UL (ref 0–0.2)
BASOPHILS NFR BLD AUTO: 0 %
BASOPHILS NFR BLD AUTO: 0 %
BILIRUB SERPL-MCNC: 0.5 MG/DL (ref 0.2–1.3)
BILIRUB SERPL-MCNC: 0.6 MG/DL (ref 0.2–1.3)
BILIRUB UR QL STRIP: NEGATIVE
BUN SERPL-MCNC: 12 MG/DL (ref 7–30)
BUN SERPL-MCNC: 16 MG/DL (ref 7–30)
CALCIUM SERPL-MCNC: 8.3 MG/DL (ref 8.5–10.1)
CALCIUM SERPL-MCNC: 9.2 MG/DL (ref 8.5–10.1)
CHLORIDE BLD-SCNC: 109 MMOL/L (ref 94–109)
CHLORIDE BLD-SCNC: 109 MMOL/L (ref 94–109)
CO2 SERPL-SCNC: 24 MMOL/L (ref 20–32)
CO2 SERPL-SCNC: 24 MMOL/L (ref 20–32)
COLOR UR AUTO: ABNORMAL
CREAT SERPL-MCNC: 0.8 MG/DL (ref 0.52–1.04)
CREAT SERPL-MCNC: 0.92 MG/DL (ref 0.52–1.04)
EOSINOPHIL # BLD AUTO: 0.1 10E3/UL (ref 0–0.7)
EOSINOPHIL # BLD AUTO: 0.2 10E3/UL (ref 0–0.7)
EOSINOPHIL NFR BLD AUTO: 1 %
EOSINOPHIL NFR BLD AUTO: 1 %
ERYTHROCYTE [DISTWIDTH] IN BLOOD BY AUTOMATED COUNT: 16.7 % (ref 10–15)
ERYTHROCYTE [DISTWIDTH] IN BLOOD BY AUTOMATED COUNT: 17.5 % (ref 10–15)
GFR SERPL CREATININE-BSD FRML MDRD: 73 ML/MIN/1.73M2
GFR SERPL CREATININE-BSD FRML MDRD: 87 ML/MIN/1.73M2
GLUCOSE BLD-MCNC: 145 MG/DL (ref 70–99)
GLUCOSE BLD-MCNC: 81 MG/DL (ref 70–99)
GLUCOSE UR STRIP-MCNC: NEGATIVE MG/DL
HCT VFR BLD AUTO: 35.8 % (ref 35–47)
HCT VFR BLD AUTO: 40.3 % (ref 35–47)
HGB BLD-MCNC: 11.4 G/DL (ref 11.7–15.7)
HGB BLD-MCNC: 12.8 G/DL (ref 11.7–15.7)
HGB UR QL STRIP: NEGATIVE
IMM GRANULOCYTES # BLD: 0.1 10E3/UL
IMM GRANULOCYTES # BLD: 0.2 10E3/UL
IMM GRANULOCYTES NFR BLD: 1 %
IMM GRANULOCYTES NFR BLD: 1 %
KETONES UR STRIP-MCNC: NEGATIVE MG/DL
LACTATE SERPL-SCNC: 1.3 MMOL/L (ref 0.7–2)
LEUKOCYTE ESTERASE UR QL STRIP: NEGATIVE
LIPASE SERPL-CCNC: 66 U/L (ref 73–393)
LYMPHOCYTES # BLD AUTO: 2.9 10E3/UL (ref 0.8–5.3)
LYMPHOCYTES # BLD AUTO: 4.8 10E3/UL (ref 0.8–5.3)
LYMPHOCYTES NFR BLD AUTO: 22 %
LYMPHOCYTES NFR BLD AUTO: 26 %
MCH RBC QN AUTO: 23.1 PG (ref 26.5–33)
MCH RBC QN AUTO: 23.7 PG (ref 26.5–33)
MCHC RBC AUTO-ENTMCNC: 31.8 G/DL (ref 31.5–36.5)
MCHC RBC AUTO-ENTMCNC: 31.8 G/DL (ref 31.5–36.5)
MCV RBC AUTO: 73 FL (ref 78–100)
MCV RBC AUTO: 75 FL (ref 78–100)
MONOCYTES # BLD AUTO: 0.7 10E3/UL (ref 0–1.3)
MONOCYTES # BLD AUTO: 1.2 10E3/UL (ref 0–1.3)
MONOCYTES NFR BLD AUTO: 6 %
MONOCYTES NFR BLD AUTO: 6 %
NEUTROPHILS # BLD AUTO: 12.1 10E3/UL (ref 1.6–8.3)
NEUTROPHILS # BLD AUTO: 9.3 10E3/UL (ref 1.6–8.3)
NEUTROPHILS NFR BLD AUTO: 66 %
NEUTROPHILS NFR BLD AUTO: 70 %
NITRATE UR QL: NEGATIVE
NRBC # BLD AUTO: 0 10E3/UL
NRBC # BLD AUTO: 0 10E3/UL
NRBC BLD AUTO-RTO: 0 /100
NRBC BLD AUTO-RTO: 0 /100
PH UR STRIP: 7.5 [PH] (ref 5–7)
PLATELET # BLD AUTO: 315 10E3/UL (ref 150–450)
PLATELET # BLD AUTO: 335 10E3/UL (ref 150–450)
POTASSIUM BLD-SCNC: 3.9 MMOL/L (ref 3.4–5.3)
POTASSIUM BLD-SCNC: 4.3 MMOL/L (ref 3.4–5.3)
PROT SERPL-MCNC: 6.5 G/DL (ref 6.8–8.8)
PROT SERPL-MCNC: 6.6 G/DL (ref 6.8–8.8)
RADIOLOGIST FLAGS: ABNORMAL
RBC # BLD AUTO: 4.93 10E6/UL (ref 3.8–5.2)
RBC # BLD AUTO: 5.41 10E6/UL (ref 3.8–5.2)
RBC URINE: 0 /HPF
SARS-COV-2 RNA RESP QL NAA+PROBE: NEGATIVE
SODIUM SERPL-SCNC: 138 MMOL/L (ref 133–144)
SODIUM SERPL-SCNC: 141 MMOL/L (ref 133–144)
SP GR UR STRIP: 1.02 (ref 1–1.03)
SQUAMOUS EPITHELIAL: 3 /HPF
UROBILINOGEN UR STRIP-MCNC: NORMAL MG/DL
WBC # BLD AUTO: 13.2 10E3/UL (ref 4–11)
WBC # BLD AUTO: 18.5 10E3/UL (ref 4–11)
WBC URINE: 0 /HPF

## 2021-10-20 PROCEDURE — 74177 CT ABD & PELVIS W/CONTRAST: CPT | Mod: 26 | Performed by: RADIOLOGY

## 2021-10-20 PROCEDURE — 250N000013 HC RX MED GY IP 250 OP 250 PS 637: Performed by: PHYSICIAN ASSISTANT

## 2021-10-20 PROCEDURE — 36415 COLL VENOUS BLD VENIPUNCTURE: CPT | Performed by: PHYSICIAN ASSISTANT

## 2021-10-20 PROCEDURE — 258N000003 HC RX IP 258 OP 636: Performed by: EMERGENCY MEDICINE

## 2021-10-20 PROCEDURE — 84450 TRANSFERASE (AST) (SGOT): CPT | Performed by: PHYSICIAN ASSISTANT

## 2021-10-20 PROCEDURE — 83605 ASSAY OF LACTIC ACID: CPT | Performed by: PHYSICIAN ASSISTANT

## 2021-10-20 PROCEDURE — 99285 EMERGENCY DEPT VISIT HI MDM: CPT | Mod: 25 | Performed by: EMERGENCY MEDICINE

## 2021-10-20 PROCEDURE — 96361 HYDRATE IV INFUSION ADD-ON: CPT

## 2021-10-20 PROCEDURE — 250N000011 HC RX IP 250 OP 636: Performed by: PHYSICIAN ASSISTANT

## 2021-10-20 PROCEDURE — 36415 COLL VENOUS BLD VENIPUNCTURE: CPT | Performed by: EMERGENCY MEDICINE

## 2021-10-20 PROCEDURE — 96375 TX/PRO/DX INJ NEW DRUG ADDON: CPT | Performed by: EMERGENCY MEDICINE

## 2021-10-20 PROCEDURE — 81003 URINALYSIS AUTO W/O SCOPE: CPT | Performed by: EMERGENCY MEDICINE

## 2021-10-20 PROCEDURE — 258N000003 HC RX IP 258 OP 636: Performed by: PHYSICIAN ASSISTANT

## 2021-10-20 PROCEDURE — C9803 HOPD COVID-19 SPEC COLLECT: HCPCS | Performed by: EMERGENCY MEDICINE

## 2021-10-20 PROCEDURE — 82247 BILIRUBIN TOTAL: CPT | Performed by: EMERGENCY MEDICINE

## 2021-10-20 PROCEDURE — 74177 CT ABD & PELVIS W/CONTRAST: CPT

## 2021-10-20 PROCEDURE — G0378 HOSPITAL OBSERVATION PER HR: HCPCS

## 2021-10-20 PROCEDURE — 96376 TX/PRO/DX INJ SAME DRUG ADON: CPT

## 2021-10-20 PROCEDURE — 96376 TX/PRO/DX INJ SAME DRUG ADON: CPT | Performed by: EMERGENCY MEDICINE

## 2021-10-20 PROCEDURE — 82040 ASSAY OF SERUM ALBUMIN: CPT | Performed by: EMERGENCY MEDICINE

## 2021-10-20 PROCEDURE — 83690 ASSAY OF LIPASE: CPT | Performed by: EMERGENCY MEDICINE

## 2021-10-20 PROCEDURE — 85025 COMPLETE CBC W/AUTO DIFF WBC: CPT | Performed by: EMERGENCY MEDICINE

## 2021-10-20 PROCEDURE — 250N000011 HC RX IP 250 OP 636: Performed by: STUDENT IN AN ORGANIZED HEALTH CARE EDUCATION/TRAINING PROGRAM

## 2021-10-20 PROCEDURE — 250N000012 HC RX MED GY IP 250 OP 636 PS 637: Performed by: PHYSICIAN ASSISTANT

## 2021-10-20 PROCEDURE — 99220 PR INITIAL OBSERVATION CARE,LEVEL III: CPT | Performed by: PHYSICIAN ASSISTANT

## 2021-10-20 PROCEDURE — 96361 HYDRATE IV INFUSION ADD-ON: CPT | Performed by: EMERGENCY MEDICINE

## 2021-10-20 PROCEDURE — 99285 EMERGENCY DEPT VISIT HI MDM: CPT | Performed by: EMERGENCY MEDICINE

## 2021-10-20 PROCEDURE — 250N000011 HC RX IP 250 OP 636: Performed by: EMERGENCY MEDICINE

## 2021-10-20 PROCEDURE — U0005 INFEC AGEN DETEC AMPLI PROBE: HCPCS | Performed by: EMERGENCY MEDICINE

## 2021-10-20 PROCEDURE — 999N000128 HC STATISTIC PERIPHERAL IV START W/O US GUIDANCE

## 2021-10-20 PROCEDURE — 85025 COMPLETE CBC W/AUTO DIFF WBC: CPT | Performed by: PHYSICIAN ASSISTANT

## 2021-10-20 PROCEDURE — 96365 THER/PROPH/DIAG IV INF INIT: CPT | Mod: 59 | Performed by: EMERGENCY MEDICINE

## 2021-10-20 PROCEDURE — 84155 ASSAY OF PROTEIN SERUM: CPT | Mod: 91 | Performed by: PHYSICIAN ASSISTANT

## 2021-10-20 RX ORDER — IPRATROPIUM BROMIDE AND ALBUTEROL SULFATE 2.5; .5 MG/3ML; MG/3ML
1 SOLUTION RESPIRATORY (INHALATION) EVERY 4 HOURS PRN
Status: DISCONTINUED | OUTPATIENT
Start: 2021-10-20 | End: 2021-10-21 | Stop reason: HOSPADM

## 2021-10-20 RX ORDER — SODIUM CHLORIDE 9 MG/ML
INJECTION, SOLUTION INTRAVENOUS CONTINUOUS
Status: DISCONTINUED | OUTPATIENT
Start: 2021-10-20 | End: 2021-10-20

## 2021-10-20 RX ORDER — PIPERACILLIN SODIUM, TAZOBACTAM SODIUM 4; .5 G/20ML; G/20ML
4.5 INJECTION, POWDER, LYOPHILIZED, FOR SOLUTION INTRAVENOUS EVERY 6 HOURS
Status: DISCONTINUED | OUTPATIENT
Start: 2021-10-20 | End: 2021-10-21

## 2021-10-20 RX ORDER — MORPHINE SULFATE 4 MG/ML
2 INJECTION, SOLUTION INTRAMUSCULAR; INTRAVENOUS ONCE
Status: COMPLETED | OUTPATIENT
Start: 2021-10-20 | End: 2021-10-20

## 2021-10-20 RX ORDER — SODIUM CHLORIDE 9 MG/ML
INJECTION, SOLUTION INTRAVENOUS CONTINUOUS
Status: DISCONTINUED | OUTPATIENT
Start: 2021-10-20 | End: 2021-10-21 | Stop reason: HOSPADM

## 2021-10-20 RX ORDER — MORPHINE SULFATE 4 MG/ML
2 INJECTION, SOLUTION INTRAMUSCULAR; INTRAVENOUS ONCE
Status: COMPLETED | OUTPATIENT
Start: 2021-10-20 | End: 2021-10-21

## 2021-10-20 RX ORDER — ERYTHROMYCIN 5 MG/G
OINTMENT OPHTHALMIC
COMMUNITY
Start: 2021-10-16 | End: 2022-02-15

## 2021-10-20 RX ORDER — PROCHLORPERAZINE 25 MG
25 SUPPOSITORY, RECTAL RECTAL EVERY 12 HOURS PRN
Status: DISCONTINUED | OUTPATIENT
Start: 2021-10-20 | End: 2021-10-21 | Stop reason: HOSPADM

## 2021-10-20 RX ORDER — PIPERACILLIN SODIUM, TAZOBACTAM SODIUM 4; .5 G/20ML; G/20ML
4.5 INJECTION, POWDER, LYOPHILIZED, FOR SOLUTION INTRAVENOUS EVERY 6 HOURS
Status: DISCONTINUED | OUTPATIENT
Start: 2021-10-20 | End: 2021-10-20

## 2021-10-20 RX ORDER — IOPAMIDOL 755 MG/ML
132 INJECTION, SOLUTION INTRAVASCULAR ONCE
Status: COMPLETED | OUTPATIENT
Start: 2021-10-20 | End: 2021-10-20

## 2021-10-20 RX ORDER — PIPERACILLIN SODIUM, TAZOBACTAM SODIUM 4; .5 G/20ML; G/20ML
4.5 INJECTION, POWDER, LYOPHILIZED, FOR SOLUTION INTRAVENOUS ONCE
Status: COMPLETED | OUTPATIENT
Start: 2021-10-20 | End: 2021-10-20

## 2021-10-20 RX ORDER — CLONAZEPAM 0.5 MG/1
.25-.5 TABLET ORAL
Status: DISCONTINUED | OUTPATIENT
Start: 2021-10-20 | End: 2021-10-21 | Stop reason: HOSPADM

## 2021-10-20 RX ORDER — NORTRIPTYLINE HCL 10 MG
10 CAPSULE ORAL
Status: DISCONTINUED | OUTPATIENT
Start: 2021-10-22 | End: 2021-10-21 | Stop reason: HOSPADM

## 2021-10-20 RX ORDER — PROCHLORPERAZINE MALEATE 10 MG
10 TABLET ORAL EVERY 6 HOURS PRN
Status: DISCONTINUED | OUTPATIENT
Start: 2021-10-20 | End: 2021-10-21 | Stop reason: HOSPADM

## 2021-10-20 RX ORDER — PREDNISONE 20 MG/1
20 TABLET ORAL 2 TIMES DAILY
COMMUNITY
Start: 2021-10-16 | End: 2022-02-15

## 2021-10-20 RX ORDER — IPRATROPIUM BROMIDE AND ALBUTEROL SULFATE 2.5; .5 MG/3ML; MG/3ML
1 SOLUTION RESPIRATORY (INHALATION)
Status: DISCONTINUED | OUTPATIENT
Start: 2021-10-20 | End: 2021-10-20

## 2021-10-20 RX ORDER — PREDNISONE 20 MG/1
20 TABLET ORAL 2 TIMES DAILY
Status: DISCONTINUED | OUTPATIENT
Start: 2021-10-20 | End: 2021-10-21 | Stop reason: HOSPADM

## 2021-10-20 RX ORDER — ACETAMINOPHEN 325 MG/1
975 TABLET ORAL EVERY 8 HOURS
Status: DISCONTINUED | OUTPATIENT
Start: 2021-10-20 | End: 2021-10-21 | Stop reason: HOSPADM

## 2021-10-20 RX ORDER — IODIXANOL 320 MG/ML
132 INJECTION, SOLUTION INTRAVASCULAR ONCE
Status: DISCONTINUED | OUTPATIENT
Start: 2021-10-20 | End: 2021-10-20 | Stop reason: CLARIF

## 2021-10-20 RX ORDER — IPRATROPIUM BROMIDE AND ALBUTEROL SULFATE 2.5; .5 MG/3ML; MG/3ML
1 SOLUTION RESPIRATORY (INHALATION) EVERY 6 HOURS PRN
Status: DISCONTINUED | OUTPATIENT
Start: 2021-10-20 | End: 2021-10-20

## 2021-10-20 RX ORDER — SIMETHICONE 40MG/0.6ML
40 SUSPENSION, DROPS(FINAL DOSAGE FORM)(ML) ORAL 4 TIMES DAILY
Status: DISCONTINUED | OUTPATIENT
Start: 2021-10-20 | End: 2021-10-21 | Stop reason: HOSPADM

## 2021-10-20 RX ORDER — PANTOPRAZOLE SODIUM 20 MG/1
20 TABLET, DELAYED RELEASE ORAL
Status: DISCONTINUED | OUTPATIENT
Start: 2021-10-21 | End: 2021-10-21 | Stop reason: HOSPADM

## 2021-10-20 RX ADMIN — PREDNISONE 20 MG: 20 TABLET ORAL at 20:20

## 2021-10-20 RX ADMIN — SODIUM CHLORIDE: 9 INJECTION, SOLUTION INTRAVENOUS at 11:28

## 2021-10-20 RX ADMIN — PIPERACILLIN SODIUM AND TAZOBACTAM SODIUM 4.5 G: 4; .5 INJECTION, POWDER, LYOPHILIZED, FOR SOLUTION INTRAVENOUS at 18:20

## 2021-10-20 RX ADMIN — IOPAMIDOL 132 ML: 755 INJECTION, SOLUTION INTRAVENOUS at 10:39

## 2021-10-20 RX ADMIN — Medication 40 MG: at 22:19

## 2021-10-20 RX ADMIN — MORPHINE SULFATE 2 MG: 4 INJECTION INTRAVENOUS at 11:50

## 2021-10-20 RX ADMIN — SODIUM CHLORIDE: 9 INJECTION, SOLUTION INTRAVENOUS at 20:24

## 2021-10-20 RX ADMIN — MORPHINE SULFATE 2 MG: 4 INJECTION INTRAVENOUS at 10:08

## 2021-10-20 RX ADMIN — PIPERACILLIN SODIUM AND TAZOBACTAM SODIUM 4.5 G: 4; .5 INJECTION, POWDER, LYOPHILIZED, FOR SOLUTION INTRAVENOUS at 11:28

## 2021-10-20 RX ADMIN — ACETAMINOPHEN 975 MG: 325 TABLET, FILM COATED ORAL at 20:32

## 2021-10-20 RX ADMIN — PIPERACILLIN SODIUM AND TAZOBACTAM SODIUM 4.5 G: 4; .5 INJECTION, POWDER, LYOPHILIZED, FOR SOLUTION INTRAVENOUS at 23:35

## 2021-10-20 RX ADMIN — SODIUM CHLORIDE 1000 ML: 9 INJECTION, SOLUTION INTRAVENOUS at 09:45

## 2021-10-20 ASSESSMENT — ENCOUNTER SYMPTOMS
ABDOMINAL PAIN: 1
HEMATURIA: 0
BACK PAIN: 1
VOMITING: 0
DYSURIA: 0
NAUSEA: 1

## 2021-10-20 ASSESSMENT — MIFFLIN-ST. JEOR: SCORE: 1521.73
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
## 2021-10-20 NOTE — ED NOTES
Bed: ED05  Expected date: 10/20/21  Expected time: 8:20 AM  Means of arrival: Ambulance  Comments:  [facility] with a 49 yo F reports of abdominal pain, diarrhea. Triaged yellow in 5 mins

## 2021-10-20 NOTE — ED TRIAGE NOTES
Patient coming in by [facility]. Patient coming in for LUQ pain. Patient states pain started last night.  Patient has history of diverticulosis. Patient had covid exposure and covid test yesterday but has not received results yet
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
## 2021-10-21 VITALS
DIASTOLIC BLOOD PRESSURE: 57 MMHG | OXYGEN SATURATION: 97 % | RESPIRATION RATE: 18 BRPM | TEMPERATURE: 98.8 F | SYSTOLIC BLOOD PRESSURE: 108 MMHG | HEART RATE: 70 BPM | WEIGHT: 215 LBS | BODY MASS INDEX: 42.21 KG/M2 | HEIGHT: 60 IN

## 2021-10-21 LAB
ALBUMIN SERPL-MCNC: 2.7 G/DL (ref 3.4–5)
ALP SERPL-CCNC: 94 U/L (ref 40–150)
ALT SERPL W P-5'-P-CCNC: 20 U/L (ref 0–50)
ANION GAP SERPL CALCULATED.3IONS-SCNC: 5 MMOL/L (ref 3–14)
AST SERPL W P-5'-P-CCNC: 6 U/L (ref 0–45)
BASOPHILS # BLD AUTO: 0 10E3/UL (ref 0–0.2)
BASOPHILS NFR BLD AUTO: 0 %
BILIRUB SERPL-MCNC: 0.5 MG/DL (ref 0.2–1.3)
BUN SERPL-MCNC: 10 MG/DL (ref 7–30)
CALCIUM SERPL-MCNC: 8.5 MG/DL (ref 8.5–10.1)
CHLORIDE BLD-SCNC: 112 MMOL/L (ref 94–109)
CO2 SERPL-SCNC: 24 MMOL/L (ref 20–32)
CREAT SERPL-MCNC: 0.9 MG/DL (ref 0.52–1.04)
CRP SERPL-MCNC: 110 MG/L (ref 0–8)
EOSINOPHIL # BLD AUTO: 0 10E3/UL (ref 0–0.7)
EOSINOPHIL NFR BLD AUTO: 0 %
ERYTHROCYTE [DISTWIDTH] IN BLOOD BY AUTOMATED COUNT: 16.8 % (ref 10–15)
ERYTHROCYTE [SEDIMENTATION RATE] IN BLOOD BY WESTERGREN METHOD: 31 MM/HR (ref 0–20)
GFR SERPL CREATININE-BSD FRML MDRD: 75 ML/MIN/1.73M2
GLUCOSE BLD-MCNC: 133 MG/DL (ref 70–99)
HCT VFR BLD AUTO: 35.2 % (ref 35–47)
HGB BLD-MCNC: 11 G/DL (ref 11.7–15.7)
HOLD SPECIMEN: NORMAL
HOLD SPECIMEN: NORMAL
IMM GRANULOCYTES # BLD: 0.1 10E3/UL
IMM GRANULOCYTES NFR BLD: 1 %
LYMPHOCYTES # BLD AUTO: 2.3 10E3/UL (ref 0.8–5.3)
LYMPHOCYTES NFR BLD AUTO: 19 %
MAGNESIUM SERPL-MCNC: 2.3 MG/DL (ref 1.6–2.3)
MCH RBC QN AUTO: 23 PG (ref 26.5–33)
MCHC RBC AUTO-ENTMCNC: 31.3 G/DL (ref 31.5–36.5)
MCV RBC AUTO: 74 FL (ref 78–100)
MONOCYTES # BLD AUTO: 0.8 10E3/UL (ref 0–1.3)
MONOCYTES NFR BLD AUTO: 6 %
NEUTROPHILS # BLD AUTO: 8.8 10E3/UL (ref 1.6–8.3)
NEUTROPHILS NFR BLD AUTO: 74 %
NRBC # BLD AUTO: 0 10E3/UL
NRBC BLD AUTO-RTO: 0 /100
PHOSPHATE SERPL-MCNC: 3.3 MG/DL (ref 2.5–4.5)
PLATELET # BLD AUTO: 313 10E3/UL (ref 150–450)
POTASSIUM BLD-SCNC: 4.1 MMOL/L (ref 3.4–5.3)
PROT SERPL-MCNC: 6.3 G/DL (ref 6.8–8.8)
RBC # BLD AUTO: 4.78 10E6/UL (ref 3.8–5.2)
SODIUM SERPL-SCNC: 141 MMOL/L (ref 133–144)
WBC # BLD AUTO: 12 10E3/UL (ref 4–11)

## 2021-10-21 PROCEDURE — 84100 ASSAY OF PHOSPHORUS: CPT | Performed by: PHYSICIAN ASSISTANT

## 2021-10-21 PROCEDURE — 99407 BEHAV CHNG SMOKING > 10 MIN: CPT

## 2021-10-21 PROCEDURE — 99217 PR OBSERVATION CARE DISCHARGE: CPT | Performed by: FAMILY MEDICINE

## 2021-10-21 PROCEDURE — G0378 HOSPITAL OBSERVATION PER HR: HCPCS

## 2021-10-21 PROCEDURE — 85025 COMPLETE CBC W/AUTO DIFF WBC: CPT | Performed by: PHYSICIAN ASSISTANT

## 2021-10-21 PROCEDURE — 250N000013 HC RX MED GY IP 250 OP 250 PS 637: Performed by: PHYSICIAN ASSISTANT

## 2021-10-21 PROCEDURE — 96376 TX/PRO/DX INJ SAME DRUG ADON: CPT

## 2021-10-21 PROCEDURE — 250N000013 HC RX MED GY IP 250 OP 250 PS 637: Performed by: NURSE PRACTITIONER

## 2021-10-21 PROCEDURE — 999N000033 HC STATISTIC CHRONIC PULMONARY DISEASE SPECIALIST

## 2021-10-21 PROCEDURE — 258N000003 HC RX IP 258 OP 636: Performed by: PHYSICIAN ASSISTANT

## 2021-10-21 PROCEDURE — 86140 C-REACTIVE PROTEIN: CPT | Performed by: PHYSICIAN ASSISTANT

## 2021-10-21 PROCEDURE — 250N000011 HC RX IP 250 OP 636: Performed by: PHYSICIAN ASSISTANT

## 2021-10-21 PROCEDURE — 83735 ASSAY OF MAGNESIUM: CPT | Performed by: PHYSICIAN ASSISTANT

## 2021-10-21 PROCEDURE — 96361 HYDRATE IV INFUSION ADD-ON: CPT

## 2021-10-21 PROCEDURE — 36415 COLL VENOUS BLD VENIPUNCTURE: CPT | Performed by: PHYSICIAN ASSISTANT

## 2021-10-21 PROCEDURE — 82040 ASSAY OF SERUM ALBUMIN: CPT | Performed by: PHYSICIAN ASSISTANT

## 2021-10-21 PROCEDURE — 250N000012 HC RX MED GY IP 250 OP 636 PS 637: Performed by: PHYSICIAN ASSISTANT

## 2021-10-21 PROCEDURE — 85652 RBC SED RATE AUTOMATED: CPT | Performed by: PHYSICIAN ASSISTANT

## 2021-10-21 RX ORDER — FLUCONAZOLE 50 MG/1
50 TABLET ORAL DAILY
Status: DISCONTINUED | OUTPATIENT
Start: 2021-10-21 | End: 2021-10-21

## 2021-10-21 RX ORDER — FLUCONAZOLE 100 MG/1
100 TABLET ORAL DAILY
Status: DISCONTINUED | OUTPATIENT
Start: 2021-10-21 | End: 2021-10-21 | Stop reason: HOSPADM

## 2021-10-21 RX ORDER — SIMETHICONE 40MG/0.6ML
40 SUSPENSION, DROPS(FINAL DOSAGE FORM)(ML) ORAL 4 TIMES DAILY PRN
Qty: 45 ML | Refills: 0 | Status: SHIPPED | OUTPATIENT
Start: 2021-10-21 | End: 2022-02-15

## 2021-10-21 RX ORDER — FLUCONAZOLE 100 MG/1
100 TABLET ORAL DAILY
Qty: 14 TABLET | Refills: 0 | Status: SHIPPED | OUTPATIENT
Start: 2021-10-21 | End: 2021-11-04

## 2021-10-21 RX ORDER — TRAMADOL HYDROCHLORIDE 50 MG/1
50 TABLET ORAL EVERY 6 HOURS PRN
Qty: 10 TABLET | Refills: 0 | Status: SHIPPED | OUTPATIENT
Start: 2021-10-21 | End: 2021-10-24

## 2021-10-21 RX ADMIN — AMOXICILLIN AND CLAVULANATE POTASSIUM 1 TABLET: 875; 125 TABLET, FILM COATED ORAL at 12:19

## 2021-10-21 RX ADMIN — SODIUM CHLORIDE: 9 INJECTION, SOLUTION INTRAVENOUS at 05:10

## 2021-10-21 RX ADMIN — Medication 40 MG: at 11:21

## 2021-10-21 RX ADMIN — PANTOPRAZOLE SODIUM 20 MG: 20 TABLET, DELAYED RELEASE ORAL at 07:31

## 2021-10-21 RX ADMIN — Medication 40 MG: at 07:31

## 2021-10-21 RX ADMIN — PIPERACILLIN SODIUM AND TAZOBACTAM SODIUM 4.5 G: 4; .5 INJECTION, POWDER, LYOPHILIZED, FOR SOLUTION INTRAVENOUS at 05:17

## 2021-10-21 RX ADMIN — FLUCONAZOLE 100 MG: 100 TABLET ORAL at 12:45

## 2021-10-21 RX ADMIN — ACETAMINOPHEN 975 MG: 325 TABLET, FILM COATED ORAL at 05:57

## 2021-10-21 RX ADMIN — PREDNISONE 20 MG: 20 TABLET ORAL at 07:31

## 2021-10-21 RX ADMIN — MORPHINE SULFATE 2 MG: 4 INJECTION INTRAVENOUS at 05:10

## 2021-10-21 NOTE — PROGRESS NOTES
Observation Goals:    -diagnostic tests and consults completed and resulted - Met  -vital signs normal or at patient baseline - Met  -adequate pain control on oral analgesics - Met   -infection is improving - Met (wbc trending down)  -returns to baseline functional status - Met  -safe disposition plan has been identified - Met

## 2021-10-21 NOTE — PROGRESS NOTES
-diagnostic tests and consults completed and resulted - in progress  -vital signs normal or at patient baseline - Met  -adequate pain control on oral analgesics - Met   -infection is improving - Met (wbc trending down)  -returns to baseline functional status - Met  -safe disposition plan has been identified - not met   Nurse to notify provider when observation goals have been met and patient is ready for discharge.

## 2021-10-21 NOTE — PLAN OF CARE
/55 (BP Location: Right arm)   Pulse 97   Temp 99.9  F (37.7  C) (Axillary)   Resp 18   Ht 1.524 m (5')   Wt 97.5 kg (215 lb)   LMP 06/22/2016 (Exact Date)   SpO2 97%   BMI 41.99 kg/m      -diagnostic tests and consults completed and resulted - in progress  -vital signs normal or at patient baseline - not met, temp 99.9   -adequate pain control on oral analgesics - not met   -infection is improving - in progress  -returns to baseline functional status - not met   -safe disposition plan has been identified - not met   Nurse to notify provider when observation goals have been met and patient is ready for discharge.

## 2021-10-21 NOTE — DISCHARGE SUMMARY
Discharge Summary    Jessie Seymour MRN# [id number]   YOB: 1970 Age: 50 year old     Date of Admission:  10/20/2021  Date of Discharge:  10/21/2021  Admitting Physician:  Gaby Decker PA-C  Discharge Physician:  Dr. Lozano  Discharging Service:  Emergency Department Observation Unit     Primary Provider: Roshan Pelayo          Discharge Diagnosis:     Diverticulitis of colon    * No resolved hospital problems. *               Discharge Disposition:   Discharged to home           Condition on Discharge:   Discharge condition: Stable   Code status on discharge: Full Code           Procedures:   All laboratory data reviewed  Imaging performed:   Abdomen CT             Discharge Medications:     Current Discharge Medication List      START taking these medications    Details   amoxicillin-clavulanate (AUGMENTIN) 875-125 MG tablet Take 1 tablet by mouth every 12 hours for 14 days  Qty: 28 tablet, Refills: 0    Associated Diagnoses: Diverticulitis of colon      simethicone (MYLICON) 40 MG/0.6ML suspension Take 0.6 mLs (40 mg) by mouth 4 times daily as needed for cramping or flatulence  Qty: 45 mL, Refills: 0    Associated Diagnoses: Diverticulitis of colon      traMADol (ULTRAM) 50 MG tablet Take 1 tablet (50 mg) by mouth every 6 hours as needed for severe pain  Qty: 10 tablet, Refills: 0    Associated Diagnoses: Diverticulitis of colon         CONTINUE these medications which have CHANGED    Details   fluconazole (DIFLUCAN) 100 MG tablet Take 1 tablet (100 mg) by mouth daily for 14 days  Qty: 14 tablet, Refills: 0    Associated Diagnoses: Diverticulitis of colon         CONTINUE these medications which have NOT CHANGED    Details   acetaminophen (TYLENOL) 325 MG tablet Take 325-650 mg by mouth every 6 hours as needed       albuterol (PROAIR HFA) 108 (90 Base) MCG/ACT inhaler Inhale 2 puffs into the lungs every 4 hours as needed for shortness of breath / dyspnea or wheezing  Qty: 18 g,  Refills: 3    Comments: Pharmacy may dispense brand covered by insurance (Proair, or proventil or ventolin or generic albuterol inhaler)  Associated Diagnoses: SOB (shortness of breath); Tachycardia; Exacerbation of asthma, unspecified asthma severity, unspecified whether persistent; Persistent cough      albuterol (PROVENTIL) (2.5 MG/3ML) 0.083% neb solution Take 1 vial (2.5 mg) by nebulization every 6 hours as needed for shortness of breath / dyspnea  Qty: 3 mL, Refills: 3    Associated Diagnoses: SOB (shortness of breath); Tachycardia; Exacerbation of asthma, unspecified asthma severity, unspecified whether persistent; Persistent cough      aspirin 81 MG tablet Take 81 mg by mouth daily       benzonatate (TESSALON) 200 MG capsule Take 1 capsule (200 mg) by mouth 3 times daily as needed for cough  Qty: 20 capsule, Refills: 0    Associated Diagnoses: Acute bronchitis with symptoms > 10 days      camphor-menthol (SARNA) 0.5-0.5 % external lotion Apply 1 mL topically every 6 hours as needed for skin care Apply to palms of hands 2-3 times daily  Qty: 59 mL, Refills: 1    Associated Diagnoses: Itching      clonazePAM (KLONOPIN) 0.5 MG tablet Take 0.5-1 tablets (0.25-0.5 mg) by mouth nightly as needed for anxiety or sleep  Qty: 45 tablet, Refills: 1    Associated Diagnoses: Primary insomnia      diphenhydrAMINE (BENADRYL) 25 MG tablet Take 1-2 tablets (25-50 mg) by mouth every 6 hours as needed for itching or allergies  Qty: 60 tablet, Refills: 1    Associated Diagnoses: S/P hysterectomy      erythromycin (ROMYCIN) 5 MG/GM ophthalmic ointment USE 1 APPLICATION IN THE RIGHT EYE EVERY 4 HOURS FOR 7 DAYS      fluticasone (FLONASE) 50 MCG/ACT nasal spray Spray 1-2 sprays into both nostrils daily  Qty: 1 g, Refills: 11    Associated Diagnoses: Seasonal allergic rhinitis, unspecified trigger      guaiFENesin-codeine (ROBITUSSIN AC) 100-10 MG/5ML solution Take 5 mLs by mouth every 6 hours as needed for cough  Qty: 118 mL,  Refills: 0    Associated Diagnoses: Acute bronchitis with symptoms > 10 days      hydrocortisone valerate (WEST-LESLY) 0.2 % external ointment Apply sparingly to affected area three times daily as needed.  Qty: 60 g, Refills: 0    Associated Diagnoses: Rash      ibuprofen (ADVIL/MOTRIN) 600 MG tablet Take 1 tablet (600 mg) by mouth every 6 hours as needed for pain Take w/ food  Qty: 30 tablet, Refills: 0    Comments: Do not take while on other NSAIDs  Associated Diagnoses: Postoperative pain      ipratropium - albuterol 0.5 mg/2.5 mg/3 mL (DUONEB) 0.5-2.5 (3) MG/3ML neb solution Take 1 vial (3 mLs) by nebulization every 6 hours as needed for shortness of breath / dyspnea or wheezing  Qty: 3 mL, Refills: 1    Associated Diagnoses: SOB (shortness of breath); Tachycardia; Exacerbation of asthma, unspecified asthma severity, unspecified whether persistent; Persistent cough      ketorolac (TORADOL) 10 MG tablet Take 1 tablet (10 mg) by mouth every 6 hours as needed for pain  Qty: 4 tablet, Refills: 0    Associated Diagnoses: Migraine without aura and without status migrainosus, not intractable      melatonin 3 MG tablet Take 3 mg by mouth nightly as needed       nortriptyline (PAMELOR) 10 MG capsule Take 1 capsule (10 mg) by mouth At Bedtime Every 3 days  Qty: 30 capsule, Refills: 1    Associated Diagnoses: Chronic migraine without aura without status migrainosus, not intractable      omeprazole (PRILOSEC) 10 MG DR capsule Take 10 mg by mouth daily as needed       !! predniSONE (DELTASONE) 20 MG tablet Take 20 mg by mouth 2 times daily (2 more days left of this as of 10/20/21)      ketoconazole (NIZORAL) 2 % external cream Apply thin layer to soles of feet twice daily for 2 weeks.  Qty: 30 g, Refills: 1    Associated Diagnoses: Tinea pedis of both feet      metroNIDAZOLE (METROCREAM) 0.75 % external cream Apply thin layer to face twice daily.  Qty: 45 g, Refills: 11    Associated Diagnoses: Rosacea      mometasone  (ELOCON) 0.1 % external cream Apply thin layer twice daily as needed for itchy areas on the hands and feet.  Qty: 50 g, Refills: 11    Associated Diagnoses: Dyshidrotic eczema      !! predniSONE (DELTASONE) 10 MG tablet Take 10 mg by mouth daily Uses for asthma as needed      rosuvastatin (CRESTOR) 10 MG tablet Take 5 mg by mouth daily Every 3 days       !! - Potential duplicate medications found. Please discuss with provider.                Consultations:   None              Brief History of Illness:   Jessie Seymour is a 50 year old female with a past medical history significant for diverticulitis of colon, hiatal hernia, RBC microcytosis 2/2 alpha thalassemia, moderate aortic regurgitation, hyperlipidemia, asthma, and s/p hysterectomy (2016) who presents to the Emergency Department for evaluation of sharp, crampy lower abdominal              Hospital Course:   #Diverticulitis  Sharp crampy LLQ abdominal pain, occasionally radiating to the left lower back. Symptoms began approximately a week ago, however worsened over the past 24 hours. Reports associated nausea but no vomiting. Denies fever or chills. Patient reports constipation for the past few days. She gave herself an enema last night with poor results. She states she feels bloated. She is able to tolerate diet. Denies dysuria, urinary frequency or urgency.  markedly elevated wbc (18.5).  UA is negative. Covid19 negative. CT of the abdomen, Sigmoid diverticulitis without evidence of perforation or pericolonic abscess and mild hepatic steatosis. In the ED, she was started on Zosyn and IVF. She was also administered Morphine 2 mg IV x 2 for pain management. Patient admitted to ed observation unit for continued IV antibiotics and monitoring. Patient reports improvement in her pain. Able to tolerate oral intake. Transitioned to oral antibiotics and Tramadol. WBC decreased prior to discharge.      #Asthma  Seen at [facility] on 10/16/2021 for  evaluation of shortness of breath and eye pain. Work up positive for elevated D-dimer. CT scan showed no evidence of PE. Subsequently patient was treated for an asthma exacerbation and discharged home with prednisone and albuterol. She is currently on Prednisone 20 mg BID, last dose 10/21. Recommended to follow up with PCP in one week for hospital follow up.      Chronic condition  #HLD-diet controlled   #GERD-Continue with Protonix 20 mg daily               Final Day of Progress before Discharge:       Physical Exam:  Blood pressure 108/57, pulse 70, temperature 98.8  F (37.1  C), temperature source Oral, resp. rate 18, height 1.524 m (5'), weight 97.5 kg (215 lb), last menstrual period 06/22/2016, SpO2 97 %, not currently breastfeeding.    EXAM:  Constitutional: Pt is oriented to person, place, and time.Pt appears well-developed and well-nourished.   HENT:   Head: Normocephalic and atraumatic.   Eyes: Conjunctivae are normal. Pupils are equal, round, and reactive to light.   Neck: Normal range of motion. Neck supple.   Cardiovascular: Normal rate, regular rhythm, normal heart sounds and intact distal pulses.    Pulmonary/Chest: Effort normal and breath sounds normal. No respiratory distress. Pt has no wheezes. Pt has no rales  Abdominal: LLQ abdominal tenderness. Soft. Bowel sounds are normal. Pt exhibits no distension and no mass. Pt has no rebound and no guarding.   Musculoskeletal: Normal range of motion. Pt exhibits no edema.   Neurological: Pt is alert and oriented to person, place, and time. Normal reflexes.   Skin: Skin is warm and dry. No rash noted.   Psychiatric: Pt has a normal mood and affect. Behavior is          Data:  All laboratory data reviewed             Significant Results:   None  Lab Results   Component Value Date    WBC 12.0 (H) 10/21/2021    HGB 11.0 (L) 10/21/2021    HCT 35.2 10/21/2021     10/21/2021     10/21/2021    POTASSIUM 4.1 10/21/2021    CHLORIDE 112 (H) 10/21/2021     CO2 24 10/21/2021    BUN 10 10/21/2021    CR 0.90 10/21/2021     (H) 10/21/2021    SED 31 (H) 10/21/2021    DD 0.5 04/02/2021    NTBNPI 79 04/02/2021    TROPONIN 0.00 12/14/2014    TROPI <0.015 04/02/2021    AST 6 10/21/2021    ALT 20 10/21/2021    ALKPHOS 94 10/21/2021    BILITOTAL 0.5 10/21/2021    INR 0.97 05/23/2016      Recent Results (from the past 48 hour(s))   CT Abdomen Pelvis w Contrast   Result Value    Radiologist flags Sigmoid diverticulitis (Urgent)    Narrative    Examination:  CT ABDOMEN PELVIS W CONTRAST 10/20/2021 10:57 AM     History: Evaluate for diverticulitis.    Comparison: MR pelvis 10/11/2020, CT cap 9/29/2019.    Technique: CT of the abdomen and pelvis were obtained with contrast.  Sagittal and coronal reconstructions created and reviewed.    Findings:     Abdomen and pelvis: Hepatic parenchyma is mildly diffusely  hypoattenuating. No focal hepatic lesions gallbladder surgically  absent. Mild intrahepatic biliary dilatation. Spleen, pancreas, and  adrenal glands are unremarkable. Kidneys demonstrate symmetric  enhancement without solid lesions, hydronephrosis, or nephrolithiasis.  Ureters and urinary bladder are unremarkable.    Stomach and small intestine are unremarkable. Acute diverticulitis  involving the proximal to mid sigmoid colon. No evidence of  perforation. No pericolonic abscess. Appendix is visualized and normal  in caliber.     No suspicious abdominal or pelvic lymphadenopathy. Small amount of  free fluid in the pelvis, reactive. No pneumoperitoneum.    Abdominal aorta is normal in caliber and patent. Celiac and mesenteric  artery origins are unremarkable. Portal veins and IVC are patent    Lower thorax: Unremarkable.    Bones and soft tissues: No acute or suspicious osseous abnormality.      Impression    Impression:   1. Sigmoid diverticulitis without evidence of perforation or  pericolonic abscess.  2. Mild hepatic steatosis.     [Urgent Result: Sigmoid  diverticulitis]    Finding was identified on 10/20/2021 11:07 AM.     Dr. Mg was contacted by Dr. Andres at 10/20/2021 11:16 AM and  verbalized understanding of the urgent finding.     I have personally reviewed the examination and initial interpretation  and I agree with the findings.    ANTWON VALADEZ MD         SYSTEM ID:  [id number]                Pending Results:   Unresulted Labs Ordered in the Past 30 Days of this Admission     No orders found for last 31 day(s).                  Discharge Instructions and Follow-Up:     Discharge Procedure Orders   Reason for your hospital stay   Order Comments: Diverticulitis     Activity   Order Comments: Your activity upon discharge: activity as tolerated     Order Specific Question Answer Comments   Is discharge order? Yes      When to contact your care team   Order Comments: Call your healthcare provider right away if any of these occur:    Fever of 100.4 F (38 C) or higher    Repeated vomiting or swelling of the belly    Weakness, dizziness, light-headedness    Pain in your belly that gets worse, is severe, or spreads to your back    Pain that moves to the right lower belly    Rectal bleeding (stools that are red, black, or maroon in color)    Unexpected vaginal bleeding     Discharge Instructions   Order Comments: You were admitted to the ED observation unit at the [facility] for  sharp, crampy lower abdominal pain radiating around to her lower back. Your white count was noted.to be elevated to 18,000. Your CT scan showed diverticulitis. You received IV fluids and IV antibiotics. You received IV Morphine for pain and you were transitioned to oral Tramadol at discharge. For antibiotics, you will continue Augmentin twice a day for 14 days along with Fluconazole 1 tablet daily to prevent a yeast infection. Recommend follow up with your primary care doctor in one week for hospital follow up.     Follow Up ([facility])   Order Comments: Follow up with primary care provider,  Roshan Pelayo, within 7 days for hospital follow- up.    Appointments on [facility] and/or [facility] (with [facility] or [facility] provider or service). Call [phone] if you haven't heard regarding these appointments within 7 days of discharge.     Diet   Order Comments: Follow this diet upon discharge: See handout for diverticulitis diet     Order Specific Question Answer Comments   Is discharge order? Yes           Attestation:  LEE Holt CNP.    Time spent on patient: 30 minutes total including face to face and coordinating care time reviewing current illness, any medication changes, and the care plan for today.

## 2021-10-21 NOTE — CONSULTS
Nicotine Cessation Consult   10/21/2021    Patient: Jessie Seymour      :  1970                    MRN:[id number]          History of Present Illness: Jessie Seymour is a 50 year old female with a past medical history significant for diverticulitis of colon, hiatal hernia, RBC microcytosis 2/2 alpha thalassemia, moderate aortic regurgitation, hyperlipidemia, asthma, and s/p hysterectomy (2016) who presents to the Emergency Department for evaluation of sharp, crampy lower abdominal pain radiating around to her lower back.    Reason for Consult: Patient is a current every day smoker    Patient open to sharing about her tobacco use but declines NRT during hospitalization.       Types of Tobacco and Amount   Cigarettes 4-10/day   E-Cigs    Smokeless Tobacco    Cigars    Pipes    Waterpipes      Patients last cigarette/vape/e-cig/smokeless tobacco:    10/20/21     [place] Test for Nicotine Dependence   How soon after waking do you smoke your first cigarette Within 5 minutes=3  5-30 minutes=2  31-60 minute=1  >61 minutes=0 3             Do you find it difficult to refrain from smoking in places where it is forbidden? e.g. [place], restaurants, etc? Yes=1  No=0   1        Which cigarette would you hate to give up? The first in the morning=1  Any other=0 1        How many cigarettes a day do you smoke? 10 or less=0  11-20=1  21-30=2  31 or more=3 0   Do you smoke more frequently in the morning?   Yes=1  No=0 1   Do you smoke even if you are sick in bed most of the day? Yes=1  No=0 0                                                                                                                                     Total Score 6   SCORE 1-2 = Low Dependence   3-4 = Low to Mod Dependence    5-7 = Moderate Dependence      8+ = High Dependence     [place] Tobacco Withdrawal Scale   DSM-5 Symptoms 0 = none, 1 = slight, 2 = mild, 3 = moderate, 4 = severe   Desire or craving to smoke 2   Anxious, nervous 2    Depressed mood, sad 0   Difficulty concentrating 0   Increased appetite, hungry, weight gain 0   Insomnia, sleep problems, awakening at night 0   Restless 0   Impatient  2   Total Score 6   Score:  1-8=Slight          8-16=Mild           16-24=moderate     24+=Severe     Stage of Behavior Change:   Pre-contemplation - No intention    Contemplation - Change on the horizon X   Preparation - Getting Ready    Action - Consistently changed (within 6 months)    Maintenance - Staying quit (more than 6 months)    Relapse - Recycling      Patients Motivation to Quit Scale    Importance (1-10): 10   Readiness (1-10) 10   Confidence  (1-10): 4   Can Patient Imagine a Future without Smoking: maybe   Quit Attempt Date:     Final Quit Date:  TBD     Patients main reason(s) for smoking include: Social, physical, emotional, other. . She attributes loss of  2 years ago in her presence and inability to revive him as on-going reason she smokes    Top Reason(s) to quit smoking: Her son     Quit Attempts: Many,    Triggers: Stress is the biggest factor    Assessment Using Motivational Interviewing:     MI Intervention: Expressed Empathy/Understanding, Supported Autonomy, Collaboration, Evocation, Permission to raise concern or advise, Open-ended questions, Reflections: simple and complex, Change talk (evoked) and Reframe    Nicotine Dependence Score: 6  Withdrawal Score: 6    Assessment:   Patient not interested in discharging with NRT, stated patches make her heart rate go up. She was unclear of the strength of patch she tried. Asked if she experienced similar symptoms with smoking more than 10 cigarettes and she said yes.   -Patient aware she needs to quit, would like to quit but this writer doesn't believe patient is ready at this time.    Education:    -Provided education on the safety of NRT, effects of stabilizing the brain to allow for behavior modification and increasing chances of quitting.   -Provided educational  "workbook, \"Quitting for Good with Treatment and Support.\"   -Discussed health risks of continued smoking.   -Provided motivation and encouragement.   -Shared that it's never too late to quit and that the benefits are immediate and profoundly impactful.   -Shared that slipping up here and there doesn't necessarily mean she failed; rather, it's an opportunity to reflect and modify her behaviors and habits and to employ alternate strategies to deal with strong triggers.    Recommendations:   -Encouraged patient to use workbook to help her understand why she smokes, come up with 5 reasons to quit, and to imagine what her future looks like without smoking.   -Encouraged her to develop strategies to distract herself to get past cravings.   -Agrees to participate in our post-hosp smoking cessation follow-up calls for treatment and support, starting at 48 hrs post discharge, then at 14 days, 1 month, and at 6 months.     Time Spent: I spent 45 minutes with patient. Will notify provider of recommendations.    Gave contact information and will call 48 hours after discharge to provide support.    Amanda Lee, RRT, CTTS  Chronic Pulmonary Disease Specialist  Office: [phone]  Pager: [phone]  Hours: M-F 8-4:30    "

## 2021-10-21 NOTE — PLAN OF CARE
"0700-Discharge    Activity: Up ad guicho, independent  Neuros: Intact. A&O x4.   Cardiac:  WDL.   Respiratory:  WDL on RA. Denies SOB. LS CTA  GI/: Voiding spontaneously. +BS, passing flatus.   Diet: High fiber, low cholesterol  Skin: CDI  Lines: Left PIV discontinued  Pain/nausea: Denies nausea, reports pain at a 6 which is \"manageable\"  Plan: Pt discharged from unit at approximately 1300. Ambulated to exit by nursing. Pt was picked up by sister and left with all belongings.      "

## 2021-10-21 NOTE — PLAN OF CARE
Observation Goal:  -diagnostic tests and consults completed and resulted: Yes  -vital signs normal or at patient baseline: Yes  -adequate pain control on oral analgesics: In Progress  -infection is improving: In progress  -returns to baseline functional status: In progress  -safe disposition plan has been identified: Yes

## 2021-10-21 NOTE — PLAN OF CARE
/53 (BP Location: Right arm)   Pulse 70   Temp 98.5  F (36.9  C) (Oral)   Resp 20   Ht 1.524 m (5')   Wt 97.5 kg (215 lb)   LMP 06/22/2016 (Exact Date)   SpO2 97%   BMI 41.99 kg/m      -diagnostic tests and consults completed and resulted - in progress  -vital signs normal or at patient baseline - met, VSS  -adequate pain control on oral analgesics - not met   -infection is improving - in progress  -returns to baseline functional status - not met   -safe disposition plan has been identified - not met   Nurse to notify provider when observation goals have been met and patient is ready for discharge.

## 2021-10-28 ENCOUNTER — VIRTUAL VISIT (OUTPATIENT)
Dept: FAMILY MEDICINE | Facility: CLINIC | Age: 51
End: 2021-10-28
Payer: COMMERCIAL

## 2021-10-28 DIAGNOSIS — F17.200 TOBACCO USE DISORDER: ICD-10-CM

## 2021-10-28 DIAGNOSIS — K57.32 DIVERTICULITIS OF COLON: Primary | ICD-10-CM

## 2021-10-28 DIAGNOSIS — E66.01 MORBID OBESITY (H): ICD-10-CM

## 2021-10-28 PROCEDURE — 99213 OFFICE O/P EST LOW 20 MIN: CPT | Mod: 95 | Performed by: FAMILY MEDICINE

## 2021-10-28 NOTE — PROGRESS NOTES
ALBERTO is a 50 year old who is being evaluated via a billable video visit.      How would you like to obtain your AVS? MyChart  If the video visit is dropped, the invitation should be resent by: Text to cell phone: [phone]  Will anyone else be joining your video visit? No    Video Start Time: 4:31 PM    Assessment & Plan       ICD-10-CM    1. Diverticulitis of colon  K57.32    2. Tobacco use disorder  F17.200    3. Morbid obesity (H)  E66.01      We discussed dietary management for the diverticulitis as well as finishing out the full course of Augmentin  With regard to her general health, I encourage smoking cessation and diet and exercise treatment for weight loss  I advised her to follow-up with her PCP in a month or so for ongoing care and to address any other health concerns that she has    Return in about 1 month (around 11/28/2021) for ongoing care with PCP.    Perry Perez MD  [facility]    Alejandra DOMINGUEZ is a 50 year old who presents for the following health issues     HPI       Hospital Follow-up Visit:    Hospital/Nursing Home/IP Rehab Facility: [facility]  Date of Admission: 10/20/21  Date of Discharge: 10/21/21  Reason(s) for Admission: Diverticulitis      Was your hospitalization related to COVID-19? No   Problems taking medications regularly:  None  Medication changes since discharge: None  Problems adhering to non-medication therapy:  Discuss order to see a surgeon    Summary of hospitalization:  [facility] discharge summary reviewed  Diagnostic Tests/Treatments reviewed.  Follow up needed: Ongoing primary care  Other Healthcare Providers Involved in Patient s Care:         None  Update since discharge: improved. Post Discharge Medication Reconciliation: discharge medications reconciled, continue medications without change.  Plan of care communicated with patient          Her abdominal pain is much improved  since going into the ER 8 days ago.  She was diagnosed with diverticulitis at that time and has been treated with Augmentin.  She was prescribed a 14-day course.  She is having some diarrhea, which may be due to the diverticulitis and/or the Augmentin.  She complains of frequent respiratory issues in the last year and a half.  She has had other health problems as well.  She thinks she may have had a Covid infection in March of last year, but was never tested for it at that time.  Her primary clinic is in [place].  She is on a variety of baseline medications as below.    Patient Active Problem List   Diagnosis     iamLUMBAGO     iamMV COLLISION NOS-     MEDICAL HISTORY OF - VERY DIFFICULT LAB DRAW and IV START     Allergic rhinitis     Tobacco use disorder     Family history of breast cancer     Obesity     Intermittent asthma     Migraine headache     Melasma     Burping     Hiatal hernia     Smoker     Alpha-thalassemia (H)     Moderate aortic regurgitation     Snoring     Bartholin's cyst     Diverticulitis of colon     Mild major depression (H)     Morbid obesity (H)     Hyperlipidemia LDL goal <100     RBC microcytosis     Pre-diabetes     Current Outpatient Medications   Medication     acetaminophen (TYLENOL) 325 MG tablet     albuterol (PROAIR HFA) 108 (90 Base) MCG/ACT inhaler     albuterol (PROVENTIL) (2.5 MG/3ML) 0.083% neb solution     amoxicillin-clavulanate (AUGMENTIN) 875-125 MG tablet     aspirin 81 MG tablet     camphor-menthol (SARNA) 0.5-0.5 % external lotion     clonazePAM (KLONOPIN) 0.5 MG tablet     diphenhydrAMINE (BENADRYL) 25 MG tablet     fluconazole (DIFLUCAN) 100 MG tablet     fluticasone (FLONASE) 50 MCG/ACT nasal spray     hydrocortisone valerate (WEST-LESLY) 0.2 % external ointment     ibuprofen (ADVIL/MOTRIN) 600 MG tablet     ipratropium - albuterol 0.5 mg/2.5 mg/3 mL (DUONEB) 0.5-2.5 (3) MG/3ML neb solution     ketoconazole (NIZORAL) 2 % external cream     ketorolac (TORADOL) 10  MG tablet     melatonin 3 MG tablet     metroNIDAZOLE (METROCREAM) 0.75 % external cream     mometasone (ELOCON) 0.1 % external cream     nortriptyline (PAMELOR) 10 MG capsule     omeprazole (PRILOSEC) 10 MG DR capsule     simethicone (MYLICON) 40 MG/0.6ML suspension     benzonatate (TESSALON) 200 MG capsule     erythromycin (ROMYCIN) 5 MG/GM ophthalmic ointment     fluticasone-salmeterol (ADVAIR) 250-50 MCG/DOSE inhaler     guaiFENesin-codeine (ROBITUSSIN AC) 100-10 MG/5ML solution     predniSONE (DELTASONE) 10 MG tablet     predniSONE (DELTASONE) 20 MG tablet     rosuvastatin (CRESTOR) 10 MG tablet     No current facility-administered medications for this visit.         Review of Systems   Mainly significant for the above.      Objective           Vitals:  No vitals were obtained today due to virtual visit.    Physical Exam   GENERAL: alert, no distress and obese  EYES: Eyes grossly normal to inspection.  No discharge or erythema, or obvious scleral/conjunctival abnormalities.  RESP: No audible wheeze, cough, or visible cyanosis.  No visible retractions or increased work of breathing.    SKIN: Visible skin clear. No significant rash, abnormal pigmentation or lesions.  NEURO: Cranial nerves grossly intact.  Mentation and speech appropriate for age.  PSYCH: Mentation appears normal, affect normal/bright, judgement and insight intact, normal speech and appearance well-groomed.    Her ER and hospital information was reviewed from last week.  Old chart records were reviewed as well.            Video-Visit Details    Type of service:  Video Visit    Video End Time:4:46 PM    Originating Location (pt. Location): Home    Distant Location (provider location):  [facility]     Platform used for Video Visit: [employer]

## 2021-11-02 ENCOUNTER — TELEPHONE (OUTPATIENT)
Dept: RESPIRATORY THERAPY | Facility: CLINIC | Age: 51
End: 2021-11-02

## 2021-11-02 NOTE — TELEPHONE ENCOUNTER
Retinal tear and detachment warning symptoms reviewed and patient instructed to call immediately if increasing floaters, flashes, or decreasing peripheral vision. Smoking Cessation Counseling Follow-Up Phone Call    Patient provided Smoking Cessation Consult during last hospitalization on 10/21/21. Called patient today for smoking cessation counseling follow-up support. Patient motivated to quit however patient reports significant trauma and smoking is her way of coping. Does state that there are times that times are longer and longer between cigarettes especially at work. She says she just keeps busy or will eat something if she has an urge or craving.     Encouraged patient to continue to:  -Use mindfulness during stressful times, allow craving or urge to subside before lighting cigarette and continued cut-back strategy.     Patient agreed to future follow-up phone calls.     Amanda Lee RRT, CTTS  Chronic Pulmonary Disease Specialist  Office: [phone]  Pager: [phone]  Hours: M-F 8-4:30

## 2021-12-01 ENCOUNTER — LAB REQUISITION (OUTPATIENT)
Dept: LAB | Facility: CLINIC | Age: 51
End: 2021-12-01

## 2021-12-01 PROCEDURE — 86481 TB AG RESPONSE T-CELL SUSP: CPT | Performed by: INTERNAL MEDICINE

## 2021-12-02 LAB
GAMMA INTERFERON BACKGROUND BLD IA-ACNC: 0.02 IU/ML
M TB IFN-G BLD-IMP: NEGATIVE
M TB IFN-G CD4+ BCKGRND COR BLD-ACNC: 9.98 IU/ML
MITOGEN IGNF BCKGRD COR BLD-ACNC: 0.01 IU/ML
MITOGEN IGNF BCKGRD COR BLD-ACNC: 0.06 IU/ML
QUANTIFERON MITOGEN: 10 IU/ML
QUANTIFERON NIL TUBE: 0.02 IU/ML
QUANTIFERON TB1 TUBE: 0.08 IU/ML
QUANTIFERON TB2 TUBE: 0.03

## 2021-12-24 ENCOUNTER — E-VISIT (OUTPATIENT)
Dept: URGENT CARE | Facility: CLINIC | Age: 51
End: 2021-12-24
Payer: COMMERCIAL

## 2021-12-24 DIAGNOSIS — Z20.822 SUSPECTED COVID-19 VIRUS INFECTION: Primary | ICD-10-CM

## 2021-12-24 PROCEDURE — 99421 OL DIG E/M SVC 5-10 MIN: CPT | Performed by: PHYSICIAN ASSISTANT

## 2021-12-24 NOTE — PATIENT INSTRUCTIONS
Dear Jessie Seymour,    Your symptoms show that you may have coronavirus (COVID-19). This illness can cause fever, cough and trouble breathing. Many people get a mild case and get better on their own. Some people can get very sick.    Will I be tested for COVID-19?  We would like to test you for Covid-19 virus. I have placed orders for this test.     For all employees or close contacts (except [place] and [employer] - see below), go to your [employer] home page and scroll down to the section that says  You have an appointment that needs to be scheduled  and click the large green button that says  Schedule Now  and follow the steps to find the next available opening.     If you are unable to complete these steps or if you cannot find any available times, please call [phone] to schedule employee testing.     [place] employees or close contacts, please call [phone].   [place] ([employer]) employees or close contacts call [phone].    Return to work/school/ guidance:  Please let your workplace manager and staffing office know when your quarantine ends     We can t give you an exact date as it depends on the above. You can calculate this on your own or work with your manager/staffing office to calculate this. (For example if you were exposed on 10/4, you would have to quarantine for 14 full days. That would be through 10/18. You could return on 10/19.)      If you receive a positive COVID-19 test result, follow the guidance of the those who are giving you the results. Usually the return to work is 10 (or in some cases 20 days from symptom onset.) If you work at [employer], you must also be cleared by Employee Occupational Health and Safety to return to work.        If you receive a negative COVID-19 test result and did not have a high risk exposure to someone with a known positive COVID-19 test, you can return to work once you're free of fever for 24 hours without fever-reducing medication and  your symptoms are improving or resolved.      If you receive a negative COVID-19 test and If you had a high risk exposure to someone who has tested positive for COVID-19 then you can return to work 14 days after your last contact with the positive individual    Note: If you have ongoing exposure to the covid positive person, this quarantine period may be more than 14 days. (For example, if you are continued to be exposed to your child who tested positive and cannot isolate from them, then the quarantine of 7-14 days can't start until your child is no longer contagious. This is typically 10 days from onset of the child's symptoms. So the total duration may be 17-24 days in this case.)    Sign up for [employer].   We know it's scary to hear that you might have COVID-19. We want to track your symptoms to make sure you're okay over the next 2 weeks. Please look for an email from my[employer]BO--this is a free, online program that we'll use to keep in touch. To sign up, follow the link in the email you will receive. Learn more at [email or web address]    How can I take care of myself?    Get lots of rest. Drink extra fluids (unless a doctor has told you not to)    Take Tylenol (acetaminophen) or ibuprofen for fever or pain. If you have liver or kidney problems, ask your family doctor if it's okay to take Tylenol o ibuprofen    If you have other health problems (like cancer, heart failure, an organ transplant or severe kidney disease): Call your specialty clinic if you don't feel better in the next 2 days.    Know when to call 911. Emergency warning signs include:  o Trouble breathing or shortness of breath  o Pain or pressure in the chest that doesn't go away  o Feeling confused like you haven't felt before, or not being able to wake up  o Bluish-colored lips or face    Where can I get more information?   [employer] - About COVID-19:   [email or web address]    CDC - What to Do If You're Sick:    www.cdc.gov/coronavirus/2019-ncov/about/steps-when-sick.html

## 2021-12-27 ENCOUNTER — LAB (OUTPATIENT)
Dept: URGENT CARE | Facility: URGENT CARE | Age: 51
End: 2021-12-27
Attending: PHYSICIAN ASSISTANT
Payer: COMMERCIAL

## 2021-12-27 DIAGNOSIS — Z20.822 SUSPECTED COVID-19 VIRUS INFECTION: ICD-10-CM

## 2021-12-27 PROCEDURE — U0005 INFEC AGEN DETEC AMPLI PROBE: HCPCS

## 2021-12-27 PROCEDURE — U0003 INFECTIOUS AGENT DETECTION BY NUCLEIC ACID (DNA OR RNA); SEVERE ACUTE RESPIRATORY SYNDROME CORONAVIRUS 2 (SARS-COV-2) (CORONAVIRUS DISEASE [COVID-19]), AMPLIFIED PROBE TECHNIQUE, MAKING USE OF HIGH THROUGHPUT TECHNOLOGIES AS DESCRIBED BY CMS-2020-01-R: HCPCS

## 2021-12-28 LAB — SARS-COV-2 RNA RESP QL NAA+PROBE: NEGATIVE

## 2022-01-11 ENCOUNTER — OFFICE VISIT (OUTPATIENT)
Dept: OTOLARYNGOLOGY | Facility: CLINIC | Age: 52
End: 2022-01-11
Payer: COMMERCIAL

## 2022-01-11 VITALS — OXYGEN SATURATION: 100 % | SYSTOLIC BLOOD PRESSURE: 115 MMHG | HEART RATE: 85 BPM | DIASTOLIC BLOOD PRESSURE: 65 MMHG

## 2022-01-11 DIAGNOSIS — H93.90 EAR LESION: ICD-10-CM

## 2022-01-11 DIAGNOSIS — H90.3 SENSORINEURAL HEARING LOSS (SNHL) OF BOTH EARS: Primary | ICD-10-CM

## 2022-01-11 PROCEDURE — 99204 OFFICE O/P NEW MOD 45 MIN: CPT | Performed by: OTOLARYNGOLOGY

## 2022-01-11 ASSESSMENT — ENCOUNTER SYMPTOMS
PHOTOPHOBIA: 0
BLURRED VISION: 0
SINUS PAIN: 0
NAUSEA: 0
SPUTUM PRODUCTION: 0
STRIDOR: 0
HEARTBURN: 0
HEADACHES: 0
COUGH: 0
VOMITING: 0
CONSTITUTIONAL NEGATIVE: 1
DOUBLE VISION: 0
TINGLING: 0
BRUISES/BLEEDS EASILY: 0
TREMORS: 0
HEMOPTYSIS: 0
DIZZINESS: 0
SORE THROAT: 0

## 2022-01-11 NOTE — PROGRESS NOTES
HPI    This pleasant patient is having three ear lesions for 2 years. They drain white cheesy stuff and become smaller. Feels pain when she touches. States bilateral tinnitus, and positional vertigo from time to time. She has a heavy breathing during night and describes excessive sleepiness during day hours but denies any morning headache. She is not sure if she has apnea, snoring, or choking sensation.    Review of Systems   Constitutional: Negative.    HENT: Positive for ear discharge, ear pain, hearing loss and tinnitus. Negative for congestion, nosebleeds, sinus pain and sore throat.    Eyes: Negative for blurred vision, double vision and photophobia.   Respiratory: Negative for cough, hemoptysis, sputum production and stridor.    Gastrointestinal: Negative for heartburn, nausea and vomiting.   Skin: Negative.    Neurological: Negative for dizziness, tingling, tremors and headaches.   Endo/Heme/Allergies: Negative for environmental allergies. Does not bruise/bleed easily.         Physical Exam  Vitals reviewed.   Constitutional:       Appearance: Normal appearance.   HENT:      Head: Normocephalic and atraumatic.      Right Ear: Tympanic membrane, ear canal and external ear normal. Decreased hearing noted. No middle ear effusion. There is no impacted cerumen.      Left Ear: Tympanic membrane normal. Decreased hearing noted.  No middle ear effusion. There is no impacted cerumen.      Nose: Septal deviation, mucosal edema and congestion present. No rhinorrhea.      Right Turbinates: Not enlarged or swollen.      Left Turbinates: Not enlarged or swollen.      Mouth/Throat:      Mouth: Mucous membranes are moist.      Pharynx: Oropharynx is clear. Uvula midline.   Eyes:      Extraocular Movements: Extraocular movements intact.      Pupils: Pupils are equal, round, and reactive to light.   Neurological:      Mental Status: She is alert.       One lesion is located at the anterior superior 1/3rd external canal, one is  located behind the lobul, obe is located at the posterior cartilage/bone junction of the EAC.    A/P  This pleasant patient is having epidermoid cysts in three different locations in her left external eatr canal. Options were discussed. I will request a hearing test to r/o any sensorineural hearing loss  and a sleep study to r/o any obstructive sleep apnea.

## 2022-01-11 NOTE — PROGRESS NOTES
Chief Complaint   Patient presents with     Consult     Ear lesion, near canal of left ear for 2 years

## 2022-01-11 NOTE — NURSING NOTE
Jessie Seymour's goals for this visit include:   Chief Complaint   Patient presents with     Consult     Ear lesion, near canal of left ear for 2 years       She requests these members of her care team be copied on today's visit information: yes    PCP: Roshan Pelayo    Referring Provider:  Sabrina Shearer MD  [address] [place] [id number]    [unfilled]

## 2022-01-11 NOTE — LETTER
1/11/2022         RE: Jessie Seymour  [address] [facility] [place] [address]        Dear Colleague,    Thank you for referring your patient, Jessie Seymour, to the [facility]. Please see a copy of my visit note below.    Chief Complaint   Patient presents with     Consult     Ear lesion, near canal of left ear for 2 years         HPI    This pleasant patient is having three ear lesions for 2 years. They drain white cheesy stuff and become smaller. Feels pain when she touches. States bilateral tinnitus, and positional vertigo from time to time. She has a heavy breathing during night and describes excessive sleepiness during day hours but denies any morning headache. She is not sure if she has apnea, snoring, or choking sensation.    Review of Systems   Constitutional: Negative.    HENT: Positive for ear discharge, ear pain, hearing loss and tinnitus. Negative for congestion, nosebleeds, sinus pain and sore throat.    Eyes: Negative for blurred vision, double vision and photophobia.   Respiratory: Negative for cough, hemoptysis, sputum production and stridor.    Gastrointestinal: Negative for heartburn, nausea and vomiting.   Skin: Negative.    Neurological: Negative for dizziness, tingling, tremors and headaches.   Endo/Heme/Allergies: Negative for environmental allergies. Does not bruise/bleed easily.         Physical Exam  Vitals reviewed.   Constitutional:       Appearance: Normal appearance.   HENT:      Head: Normocephalic and atraumatic.      Right Ear: Tympanic membrane, ear canal and external ear normal. Decreased hearing noted. No middle ear effusion. There is no impacted cerumen.      Left Ear: Tympanic membrane normal. Decreased hearing noted.  No middle ear effusion. There is no impacted cerumen.      Nose: Septal deviation, mucosal edema and congestion present. No rhinorrhea.      Right Turbinates: Not enlarged or swollen.      Left Turbinates: Not enlarged or  swollen.      Mouth/Throat:      Mouth: Mucous membranes are moist.      Pharynx: Oropharynx is clear. Uvula midline.   Eyes:      Extraocular Movements: Extraocular movements intact.      Pupils: Pupils are equal, round, and reactive to light.   Neurological:      Mental Status: She is alert.       One lesion is located at the anterior superior 1/3rd external canal, one is located behind the lobul, obe is located at the posterior cartilage/bone junction of the EAC.    A/P  This pleasant patient is having epidermoid cysts in three different locations in her left external eatr canal. Options were discussed. I will request a hearing test to r/o any sensorineural hearing loss  and a sleep study to r/o any obstructive sleep apnea.          Again, thank you for allowing me to participate in the care of your patient.        Sincerely,        Katharine Tony MD

## 2022-01-13 PROCEDURE — U0005 INFEC AGEN DETEC AMPLI PROBE: HCPCS | Performed by: INTERNAL MEDICINE

## 2022-01-14 ENCOUNTER — LAB REQUISITION (OUTPATIENT)
Dept: LAB | Facility: CLINIC | Age: 52
End: 2022-01-14

## 2022-01-14 LAB — SARS-COV-2 RNA RESP QL NAA+PROBE: NEGATIVE

## 2022-01-20 ENCOUNTER — LAB REQUISITION (OUTPATIENT)
Dept: LAB | Facility: CLINIC | Age: 52
End: 2022-01-20

## 2022-01-20 PROCEDURE — U0005 INFEC AGEN DETEC AMPLI PROBE: HCPCS | Performed by: INTERNAL MEDICINE

## 2022-01-21 ENCOUNTER — OFFICE VISIT (OUTPATIENT)
Dept: URGENT CARE | Facility: URGENT CARE | Age: 52
End: 2022-01-21
Payer: COMMERCIAL

## 2022-01-21 VITALS
SYSTOLIC BLOOD PRESSURE: 136 MMHG | OXYGEN SATURATION: 98 % | WEIGHT: 215 LBS | HEIGHT: 61 IN | BODY MASS INDEX: 40.59 KG/M2 | TEMPERATURE: 97 F | DIASTOLIC BLOOD PRESSURE: 81 MMHG | HEART RATE: 91 BPM

## 2022-01-21 DIAGNOSIS — J45.21 MILD INTERMITTENT ASTHMA WITH ACUTE EXACERBATION: Primary | ICD-10-CM

## 2022-01-21 DIAGNOSIS — J20.9 ACUTE BRONCHITIS WITH COEXISTING CONDITION REQUIRING PROPHYLACTIC TREATMENT: ICD-10-CM

## 2022-01-21 LAB — SARS-COV-2 RNA RESP QL NAA+PROBE: NEGATIVE

## 2022-01-21 PROCEDURE — 99214 OFFICE O/P EST MOD 30 MIN: CPT | Performed by: PHYSICIAN ASSISTANT

## 2022-01-21 RX ORDER — AZITHROMYCIN 250 MG/1
TABLET, FILM COATED ORAL
Qty: 6 TABLET | Refills: 0 | Status: SHIPPED | OUTPATIENT
Start: 2022-01-21 | End: 2022-01-26

## 2022-01-21 RX ORDER — PREDNISONE 20 MG/1
40 TABLET ORAL DAILY
Qty: 10 TABLET | Refills: 0 | Status: SHIPPED | OUTPATIENT
Start: 2022-01-21 | End: 2022-01-26

## 2022-01-21 ASSESSMENT — ENCOUNTER SYMPTOMS
SHORTNESS OF BREATH: 0
CARDIOVASCULAR NEGATIVE: 1
ENDOCRINE NEGATIVE: 1
MUSCULOSKELETAL NEGATIVE: 1
MYALGIAS: 0
RHINORRHEA: 0
FEVER: 0
SORE THROAT: 0
BACK PAIN: 0
NAUSEA: 0
WOUND: 0
NECK STIFFNESS: 0
EYES NEGATIVE: 1
VOMITING: 0
LIGHT-HEADEDNESS: 0
WEAKNESS: 0
NECK PAIN: 0
PALPITATIONS: 0
DIZZINESS: 0
JOINT SWELLING: 0
WHEEZING: 1
HEADACHES: 0
ARTHRALGIAS: 0
DIARRHEA: 0
CHILLS: 0
ALLERGIC/IMMUNOLOGIC NEGATIVE: 1
COUGH: 1

## 2022-01-21 ASSESSMENT — ASTHMA QUESTIONNAIRES
QUESTION_4 LAST FOUR WEEKS HOW OFTEN HAVE YOU USED YOUR RESCUE INHALER OR NEBULIZER MEDICATION (SUCH AS ALBUTEROL): THREE OR MORE TIMES PER DAY
QUESTION_2 LAST FOUR WEEKS HOW OFTEN HAVE YOU HAD SHORTNESS OF BREATH: MORE THAN ONCE A DAY
QUESTION_1 LAST FOUR WEEKS HOW MUCH OF THE TIME DID YOUR ASTHMA KEEP YOU FROM GETTING AS MUCH DONE AT WORK, SCHOOL OR AT HOME: SOME OF THE TIME
QUESTION_3 LAST FOUR WEEKS HOW OFTEN DID YOUR ASTHMA SYMPTOMS (WHEEZING, COUGHING, SHORTNESS OF BREATH, CHEST TIGHTNESS OR PAIN) WAKE YOU UP AT NIGHT OR EARLIER THAN USUAL IN THE MORNING: TWO OR THREE NIGHTS A WEEK
ACT_TOTALSCORE: 9
EMERGENCY_ROOM_LAST_YEAR_TOTAL: ONE
ACUTE_EXACERBATION_TODAY: NO
QUESTION_5 LAST FOUR WEEKS HOW WOULD YOU RATE YOUR ASTHMA CONTROL: POORLY CONTROLLED
ACT_TOTALSCORE: 9

## 2022-01-21 ASSESSMENT — MIFFLIN-ST. JEOR: SCORE: 1532.61

## 2022-01-21 NOTE — PATIENT INSTRUCTIONS

## 2022-01-21 NOTE — PROGRESS NOTES
"Chief Complaint:     Chief Complaint   Patient presents with     Asthma       No results found for any visits on 01/21/22.    Medical Decision Making:    Vital signs reviewed by Jorge Yun PA-C  /81 (BP Location: Left arm, Patient Position: Sitting, Cuff Size: Adult Large)   Pulse 91   Temp 97  F (36.1  C) (Tympanic)   Ht 1.549 m (5' 1\")   Wt 97.5 kg (215 lb)   LMP 06/22/2016 (Exact Date)   SpO2 98%   BMI 40.62 kg/m      Differential Diagnosis:  URI Adult/Peds:  Asthma exacerbation, Bronchitis-viral, Pneumonia, Viral syndrome and Viral upper respiratory illness        ASSESSMENT    1. Mild intermittent asthma with acute exacerbation    2. Acute bronchitis with coexisting condition requiring prophylactic treatment        PLAN    Patient is in no acute distress.    Temp is 97 in clinic today, lung sounds were clear, and O2 sats at 98% on RA.    Asthma control test filled out in clinic today.    Rx for Prednisone and Zithromax.  Patient declined refill of asthma medications today.    COVID swab collected in clinic today.  Rest, Push fluids, vaporizer, elevation of head of bed.  Ibuprofen and or Tylenol for any fever or body aches.  Over the counter cough suppressant- PRN- as discussed.   If symptoms worsen, recheck immediately otherwise follow up with your PCP in 1 week if symptoms are not improving.  Worrisome symptoms discussed with instructions to go to the ED.  Patient verbalized understanding and agreed with this plan.    Labs:    No results found for any visits on 01/21/22.     Vital signs reviewed by Jorge Yun PA-C  /81 (BP Location: Left arm, Patient Position: Sitting, Cuff Size: Adult Large)   Pulse 91   Temp 97  F (36.1  C) (Tympanic)   Ht 1.549 m (5' 1\")   Wt 97.5 kg (215 lb)   LMP 06/22/2016 (Exact Date)   SpO2 98%   BMI 40.62 kg/m      Current Meds      Current Outpatient Medications:      acetaminophen (TYLENOL) 325 MG tablet, Take 325-650 mg by mouth every 6 hours " as needed , Disp: , Rfl:      albuterol (PROAIR HFA) 108 (90 Base) MCG/ACT inhaler, Inhale 2 puffs into the lungs every 4 hours as needed for shortness of breath / dyspnea or wheezing, Disp: 18 g, Rfl: 3     albuterol (PROVENTIL) (2.5 MG/3ML) 0.083% neb solution, Take 1 vial (2.5 mg) by nebulization every 6 hours as needed for shortness of breath / dyspnea, Disp: 3 mL, Rfl: 3     aspirin 81 MG tablet, Take 81 mg by mouth daily , Disp: , Rfl:      azithromycin (ZITHROMAX) 250 MG tablet, Take 2 tablets (500 mg) by mouth daily for 1 day, THEN 1 tablet (250 mg) daily for 4 days., Disp: 6 tablet, Rfl: 0     benzonatate (TESSALON) 200 MG capsule, Take 1 capsule (200 mg) by mouth 3 times daily as needed for cough, Disp: 20 capsule, Rfl: 0     camphor-menthol (SARNA) 0.5-0.5 % external lotion, Apply 1 mL topically every 6 hours as needed for skin care Apply to palms of hands 2-3 times daily, Disp: 59 mL, Rfl: 1     clonazePAM (KLONOPIN) 0.5 MG tablet, Take 0.5-1 tablets (0.25-0.5 mg) by mouth nightly as needed for anxiety or sleep, Disp: 45 tablet, Rfl: 1     diphenhydrAMINE (BENADRYL) 25 MG tablet, Take 1-2 tablets (25-50 mg) by mouth every 6 hours as needed for itching or allergies, Disp: 60 tablet, Rfl: 1     erythromycin (ROMYCIN) 5 MG/GM ophthalmic ointment, USE 1 APPLICATION IN THE RIGHT EYE EVERY 4 HOURS FOR 7 DAYS, Disp: , Rfl:      fluticasone (FLONASE) 50 MCG/ACT nasal spray, Spray 1-2 sprays into both nostrils daily, Disp: 1 g, Rfl: 11     fluticasone-salmeterol (ADVAIR) 250-50 MCG/DOSE inhaler, Inhale 1 puff into the lungs every 12 hours, Disp: 1 each, Rfl: 0     guaiFENesin-codeine (ROBITUSSIN AC) 100-10 MG/5ML solution, Take 5 mLs by mouth every 6 hours as needed for cough, Disp: 118 mL, Rfl: 0     hydrocortisone valerate (WEST-LESLY) 0.2 % external ointment, Apply sparingly to affected area three times daily as needed., Disp: 60 g, Rfl: 0     ibuprofen (ADVIL/MOTRIN) 600 MG tablet, Take 1 tablet (600 mg) by  mouth every 6 hours as needed for pain Take w/ food, Disp: 30 tablet, Rfl: 0     ipratropium - albuterol 0.5 mg/2.5 mg/3 mL (DUONEB) 0.5-2.5 (3) MG/3ML neb solution, Take 1 vial (3 mLs) by nebulization every 6 hours as needed for shortness of breath / dyspnea or wheezing, Disp: 3 mL, Rfl: 1     ketoconazole (NIZORAL) 2 % external cream, Apply thin layer to soles of feet twice daily for 2 weeks., Disp: 30 g, Rfl: 1     ketorolac (TORADOL) 10 MG tablet, Take 1 tablet (10 mg) by mouth every 6 hours as needed for pain, Disp: 4 tablet, Rfl: 0     melatonin 3 MG tablet, Take 3 mg by mouth nightly as needed , Disp: , Rfl:      metroNIDAZOLE (METROCREAM) 0.75 % external cream, Apply thin layer to face twice daily., Disp: 45 g, Rfl: 11     mometasone (ELOCON) 0.1 % external cream, Apply thin layer twice daily as needed for itchy areas on the hands and feet., Disp: 50 g, Rfl: 11     nortriptyline (PAMELOR) 10 MG capsule, Take 1 capsule (10 mg) by mouth At Bedtime Every 3 days, Disp: 30 capsule, Rfl: 1     omeprazole (PRILOSEC) 10 MG DR capsule, Take 10 mg by mouth daily as needed , Disp: , Rfl:      predniSONE (DELTASONE) 10 MG tablet, Take 10 mg by mouth daily Uses for asthma as needed , Disp: , Rfl:      predniSONE (DELTASONE) 20 MG tablet, Take 2 tablets (40 mg) by mouth daily for 5 days, Disp: 10 tablet, Rfl: 0     predniSONE (DELTASONE) 20 MG tablet, Take 20 mg by mouth 2 times daily (2 more days left of this as of 10/20/21), Disp: , Rfl:      rosuvastatin (CRESTOR) 10 MG tablet, Take 5 mg by mouth daily Every 3 days, Disp: , Rfl:      simethicone (MYLICON) 40 MG/0.6ML suspension, Take 0.6 mLs (40 mg) by mouth 4 times daily as needed for cramping or flatulence, Disp: 45 mL, Rfl: 0      Respiratory History    frequent episodes of bronchitis, pneumonia and asthma      SUBJECTIVE    HPI: Jessie Seymour is an 51 year old female who presents with chest congestion, cough nonproductive, occasional and wheezing.  Symptoms  began 2  days ago and has gradually worsening.  There is no shortness of breath and chest pain.  Patient is eating and drinking well.  No fever, nausea, vomiting, or diarrhea.    Patient denies any recent travel or exposure to known COVID positive tested individual.      ROS:     Review of Systems   Constitutional: Negative for chills and fever.   HENT: Positive for congestion. Negative for ear pain, rhinorrhea and sore throat.    Eyes: Negative.    Respiratory: Positive for cough and wheezing. Negative for shortness of breath.    Cardiovascular: Negative.  Negative for chest pain and palpitations.   Gastrointestinal: Negative for diarrhea, nausea and vomiting.   Endocrine: Negative.    Genitourinary: Negative.    Musculoskeletal: Negative.  Negative for arthralgias, back pain, joint swelling, myalgias, neck pain and neck stiffness.   Skin: Negative.  Negative for rash and wound.   Allergic/Immunologic: Negative.  Negative for immunocompromised state.   Neurological: Negative for dizziness, weakness, light-headedness and headaches.         Family History   Family History   Problem Relation Age of Onset     Cardiovascular Mother         long QT syndrom     Heart Disease Mother         entire family has heart problems of some type     Hypertension Mother      Depression Mother      Breast Cancer Mother      Thyroid Disease Mother      Osteoporosis Mother      Thyroid Disease Mother      Dementia Mother      Heart Disease Father         not sure about details, had stroke     Hypertension Father      Heart Disease Brother         CP Hypertension-7 brothers-5 alive right now      Hypertension Brother      Heart Disease Brother         CP Hypertension     Genetic Disorder Brother      Heart Disease Sister         stroke      Diabetes Sister      Heart Disease Brother         long QT syndrome- at age of 16     Heart Disease Maternal Grandmother      Hypertension Maternal Grandmother      Heart Disease Maternal  Grandfather      Hypertension Maternal Grandfather      Diabetes Sister         6 sisters     Musculoskeletal Disorder Sister      Genetic Disorder Sister         lupus     Hypertension Sister      Diabetes Maternal Uncle      Hypertension Sister      Cerebrovascular Disease Sister      Hypertension Brother      Hypertension Paternal Grandmother      Hypertension Paternal Grandfather      Hypertension Sister      Asthma Sister      Allergies Other         most everyone in family has some type of allergy     Asthma Other      Cancer Other 40        one maternal cousin with bca in 50s, one paternal cousin with bca in 40s     Cerebrovascular Disease Sister      Osteoporosis Sister      Gynecology Sister         3 sisters with PCOS     Blood Disease Sister         trade for sickle cell anemia     Diabetes Sister      Hypertension Sister      Cerebrovascular Disease Sister      Depression Sister      Asthma Sister      Osteoporosis Sister      Genetic Disorder Sister      Diabetes Other      Hypertension Cousin      Other Cancer Cousin      Breast Cancer Cousin      Other Cancer Other      Asthma Nephew      Thyroid Disease Other      Cancer - colorectal No family hx of      Long QT syndrome Mother      Acute Myocardial Infarction Mother      Cerebrovascular Disease Father      Acute Myocardial Infarction Sister      Cerebrovascular Disease Sister      Hypertension Sister      Long QT syndrome Brother      Acute Myocardial Infarction Sister      Cerebrovascular Disease Sister      Hypertension Sister      Pulmonary Hypertension Brother         Problem history  Patient Active Problem List   Diagnosis     iamLUMBAGO     iamMV COLLISION NOS-     MEDICAL HISTORY OF - VERY DIFFICULT LAB DRAW and IV START     Allergic rhinitis     Tobacco use disorder     Family history of breast cancer     Obesity     Intermittent asthma     Migraine headache     Melasma     Burping     Hiatal hernia     Smoker     Alpha-thalassemia (H)      Moderate aortic regurgitation     Snoring     Bartholin's cyst     Diverticulitis of colon     Mild major depression (H)     Morbid obesity (H)     Hyperlipidemia LDL goal <100     RBC microcytosis     Pre-diabetes        Allergies  Allergies   Allergen Reactions     Loratadine Other (See Comments)     Dries her up and gets rare sinus infection     Morphine Itching     PN: LW Reaction: Itching, Pruritis     Oxycodone Nausea and Vomiting and Itching     Other reaction(s): Diaphoresis  Other reaction(s): Diaphoresis     Sumatriptan      Worsens migraine     Zofran [Ondansetron]      CARDIO recommended never to take this medication      Codeine Anxiety     Becomes tearful     Hydrocodone-Acetaminophen Anxiety        Social History  Social History     Socioeconomic History     Marital status: Single     Spouse name: Not on file     Number of children: Not on file     Years of education: Not on file     Highest education level: Not on file   Occupational History     Not on file   Tobacco Use     Smoking status: Current Every Day Smoker     Packs/day: 0.50     Years: 15.00     Pack years: 7.50     Types: Cigarettes     Smokeless tobacco: Never Used     Tobacco comment: Pt. smokes appx. 6 cigarettes daily   Substance and Sexual Activity     Alcohol use: Yes     Alcohol/week: 0.0 standard drinks     Comment: Rarely     Drug use: No     Sexual activity: Yes     Partners: Male     Birth control/protection: Female Surgical     Comment:  using nothing for contraception    Other Topics Concern     Parent/sibling w/ CABG, MI or angioplasty before 65F 55M? 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 , one son- 7  yr old, one dog     Social Determinants of Health     Financial Resource Strain: Not on file   Food Insecurity: Not on file   Transportation Needs: Not on file   Physical Activity: Not on file   Stress: Not on file   Social Connections: Not on file   Intimate Partner Violence: Not on file   Housing Stability: Not on file        OBJECTIVE     Vital signs reviewed by Jorge Yun PA-C  /81 (BP Location: Left arm, Patient Position: Sitting, Cuff Size: Adult Large)   Pulse 91   Temp 97  F (36.1  C) (Tympanic)   Ht 1.549 m (5' 1\")   Wt 97.5 kg (215 lb)   LMP 06/22/2016 (Exact Date)   SpO2 98%   BMI 40.62 kg/m       Physical Exam  Vitals and nursing note reviewed.   Constitutional:       General: She is not in acute distress.     Appearance: She is well-developed. She is not ill-appearing, toxic-appearing or diaphoretic.   HENT:      Head: Normocephalic and atraumatic.      Right Ear: Hearing, tympanic membrane, ear canal and external ear normal. Tympanic membrane is not perforated, erythematous, retracted or bulging.      Left Ear: Hearing, tympanic membrane, ear canal and external ear normal. Tympanic membrane is not perforated, erythematous, retracted or bulging.      Nose: Congestion present. No mucosal edema or rhinorrhea.      Right Sinus: No maxillary sinus tenderness or frontal sinus tenderness.      Left Sinus: No maxillary sinus tenderness or frontal sinus tenderness.      Mouth/Throat:      Pharynx: No pharyngeal swelling, oropharyngeal exudate, posterior oropharyngeal erythema or uvula swelling.      Tonsils: No tonsillar exudate or tonsillar abscesses. 0 on the right. 0 on the left.   Eyes:      General:         Right eye: No discharge.         Left eye: No discharge.      Pupils: Pupils are " equal, round, and reactive to light.   Cardiovascular:      Rate and Rhythm: Normal rate and regular rhythm.      Heart sounds: Normal heart sounds. No murmur heard.  No friction rub. No gallop.    Pulmonary:      Effort: Pulmonary effort is normal. No respiratory distress.      Breath sounds: Normal breath sounds. No decreased breath sounds, wheezing, rhonchi or rales.   Chest:      Chest wall: No tenderness.   Abdominal:      General: Bowel sounds are normal. There is no distension.      Palpations: Abdomen is soft. There is no mass.      Tenderness: There is no abdominal tenderness. There is no guarding.   Musculoskeletal:      Cervical back: Normal range of motion and neck supple.   Lymphadenopathy:      Head:      Right side of head: No submental, submandibular, tonsillar, preauricular or posterior auricular adenopathy.      Left side of head: No submental, submandibular, tonsillar, preauricular or posterior auricular adenopathy.      Cervical: No cervical adenopathy.      Right cervical: No superficial or posterior cervical adenopathy.     Left cervical: No superficial or posterior cervical adenopathy.   Skin:     General: Skin is warm and dry.      Findings: No rash.   Neurological:      Mental Status: She is alert and oriented to person, place, and time.      Cranial Nerves: No cranial nerve deficit.      Deep Tendon Reflexes: Reflexes are normal and symmetric.   Psychiatric:         Behavior: Behavior normal. Behavior is cooperative.         Thought Content: Thought content normal.         Judgment: Judgment normal.           Jorge Yun PA-C  1/21/2022, 5:14 PM

## 2022-02-01 ENCOUNTER — OFFICE VISIT (OUTPATIENT)
Dept: AUDIOLOGY | Facility: CLINIC | Age: 52
End: 2022-02-01
Payer: COMMERCIAL

## 2022-02-01 DIAGNOSIS — H93.13 TINNITUS OF BOTH EARS: Primary | ICD-10-CM

## 2022-02-01 PROCEDURE — 99207 PR NO CHARGE LOS: CPT | Performed by: AUDIOLOGIST

## 2022-02-01 PROCEDURE — 92550 TYMPANOMETRY & REFLEX THRESH: CPT | Performed by: AUDIOLOGIST

## 2022-02-01 PROCEDURE — 92557 COMPREHENSIVE HEARING TEST: CPT | Performed by: AUDIOLOGIST

## 2022-02-01 NOTE — PROGRESS NOTES
AUDIOLOGY REPORT    SUMMARY: Audiology visit completed. See audiogram for results.    RECOMMENDATIONS: Follow-up with ENT.    Maggie Rankin  Doctor of Audiology  [place] License # [id number]

## 2022-02-02 ENCOUNTER — TELEPHONE (OUTPATIENT)
Dept: OTOLARYNGOLOGY | Facility: CLINIC | Age: 52
End: 2022-02-02
Payer: COMMERCIAL

## 2022-02-02 ENCOUNTER — PREP FOR PROCEDURE (OUTPATIENT)
Dept: OTOLARYNGOLOGY | Facility: CLINIC | Age: 52
End: 2022-02-02
Payer: COMMERCIAL

## 2022-02-02 DIAGNOSIS — H90.3 SENSORINEURAL HEARING LOSS (SNHL) OF BOTH EARS: Primary | ICD-10-CM

## 2022-02-02 DIAGNOSIS — H93.90 EAR LESION: Primary | ICD-10-CM

## 2022-02-02 NOTE — TELEPHONE ENCOUNTER
Patient was in clinic for Audiogram and said she was ready to schedule surgery.   Rosa ALVARADO

## 2022-02-04 NOTE — TELEPHONE ENCOUNTER
Spoke with the patient about getting surgery scheduled with Dr. Tony. Her son recently tore his ACL and needs to have surgery as well, so she needs to get his surgery scheduled first and then she will call back to get hers scheduled. I told her that I have the order and she is able to call me to get scheduled at anytime. I relayed to her that Dr. Tony has been about 4-6 weeks out for surgery at this time, but we will always check availability when she calls back. She understood.    Amita Gipson, Surgery Scheduling Coordinator

## 2022-02-11 ENCOUNTER — LAB REQUISITION (OUTPATIENT)
Dept: LAB | Facility: CLINIC | Age: 52
End: 2022-02-11

## 2022-02-11 PROCEDURE — U0005 INFEC AGEN DETEC AMPLI PROBE: HCPCS

## 2022-02-11 ASSESSMENT — ANXIETY QUESTIONNAIRES
3. WORRYING TOO MUCH ABOUT DIFFERENT THINGS: SEVERAL DAYS
GAD7 TOTAL SCORE: 14
6. BECOMING EASILY ANNOYED OR IRRITABLE: SEVERAL DAYS
4. TROUBLE RELAXING: NEARLY EVERY DAY
GAD7 TOTAL SCORE: 14
GAD7 TOTAL SCORE: 14
2. NOT BEING ABLE TO STOP OR CONTROL WORRYING: NEARLY EVERY DAY
5. BEING SO RESTLESS THAT IT IS HARD TO SIT STILL: SEVERAL DAYS
7. FEELING AFRAID AS IF SOMETHING AWFUL MIGHT HAPPEN: NEARLY EVERY DAY
1. FEELING NERVOUS, ANXIOUS, OR ON EDGE: MORE THAN HALF THE DAYS
7. FEELING AFRAID AS IF SOMETHING AWFUL MIGHT HAPPEN: NEARLY EVERY DAY

## 2022-02-11 ASSESSMENT — ENCOUNTER SYMPTOMS
HEADACHES: 0
BELCHING: 0
FREQUENCY: 0
ABDOMINAL PAIN: 1
NAUSEA: 1
FLATUS: 0
DIARRHEA: 1
ARTHRALGIAS: 0
FEVER: 1
ANOREXIA: 1
CONSTIPATION: 0
HEMATURIA: 0
MYALGIAS: 0
WEIGHT LOSS: 0
HEMATOCHEZIA: 0
VOMITING: 0
DYSURIA: 0

## 2022-02-11 ASSESSMENT — PATIENT HEALTH QUESTIONNAIRE - PHQ9
SUM OF ALL RESPONSES TO PHQ QUESTIONS 1-9: 11
SUM OF ALL RESPONSES TO PHQ QUESTIONS 1-9: 11
10. IF YOU CHECKED OFF ANY PROBLEMS, HOW DIFFICULT HAVE THESE PROBLEMS MADE IT FOR YOU TO DO YOUR WORK, TAKE CARE OF THINGS AT HOME, OR GET ALONG WITH OTHER PEOPLE: VERY DIFFICULT

## 2022-02-11 NOTE — PROGRESS NOTES
Jacy is a 51 year old who is being evaluated via a billable video visit.      How would you like to obtain your AVS? MyChart   If the video visit is dropped, the invitation should be resent by: Text to cell phone: Per my chart   Will anyone else be joining your video visit? No    Video Start Time: 1:00PM    Assessment & Plan     Diverticulitis of colon  See AVS  - metroNIDAZOLE (FLAGYL) 500 MG tablet; Take 1 tablet (500 mg) by mouth 3 times daily for 10 days  - ciprofloxacin (CIPRO) 500 MG tablet; Take 1 tablet (500 mg) by mouth 2 times daily for 10 days    Screen for colon cancer  - Adult Gastro Ref - Procedure Only; Future    Primary insomnia  Still sees a sleep therapist  - clonazePAM (KLONOPIN) 0.5 MG tablet; Take 0.5-1 tablets (0.25-0.5 mg) by mouth nightly as needed for anxiety or sleep    Hyperlipidemia LDL goal <100  Needs labs but Crestor causing ?vertigo, will do trial of simvastatin  - simvastatin (ZOCOR) 20 MG tablet; Take 1 tablet (20 mg) by mouth At Bedtime  - Lipid panel reflex to direct LDL Fasting; Future  - Comprehensive metabolic panel    Pre-diabetes  lab results and schedule of future lab studies reviewed with patient, repeat labs ordered prior to next appointment, orders and follow up as documented in EpicCare, reviewed diet, exercise and weight control, recommended sodium restriction, copy of written low fat low cholesterol diet provided and reviewed.    - Hemoglobin A1c; Future    Encounter for screening mammogram for breast cancer  - *MA Screening Digital Bilateral; Future        Return in about 16 weeks (around 6/7/2022) for Routine preventive.    Roshan Pelayo MD  [facility] Louie is a 51 year old who presents for the following health issues  accompanied by her Self.    Abdominal Pain   This is a chronic problem. The current episode started more than 1 year ago. The problem occurs 2 to 4 times per day. The problem has been waxing and waning. The  pain is located in the left flank. The quality of the pain is cramping, dull and a sensation of fullness. The pain is at a severity of 4/10. Associated symptoms include anorexia, fever, diarrhea and nausea. Pertinent negatives include belching, flatus, hematochezia, melena, vomiting, constipation, dysuria, frequency, hematuria, headaches, arthralgias and myalgias. Nothing aggravates the symptoms. The symptoms are relieved by being still.      Answers for HPI/ROS submitted by the patient on 2/11/2022  If you checked off any problems, how difficult have these problems made it for you to do your work, take care of things at home, or get along with other people?: Very difficult  PHQ9 TOTAL SCORE: 11  BON 7 TOTAL SCORE: 14  Onset quality: gradual  Episode duration: 5 days  Radiates to: left flank, pelvis  weight loss: No      Review of Systems   Constitutional: Positive for fever.   Gastrointestinal: Positive for abdominal pain, anorexia, diarrhea and nausea. Negative for constipation, flatus, hematochezia, melena and vomiting.   Genitourinary: Negative for dysuria, frequency and hematuria.   Musculoskeletal: Negative for arthralgias and myalgias.   Neurological: Negative for headaches.      Constitutional, HEENT, cardiovascular, pulmonary, GI, , musculoskeletal, neuro, skin, endocrine and psych systems are negative, except as otherwise noted.      Objective           Vitals:  No vitals were obtained today due to virtual visit.    Physical Exam   GENERAL: Healthy, alert and no distress  EYES: Eyes grossly normal to inspection.  No discharge or erythema, or obvious scleral/conjunctival abnormalities.  RESP: No audible wheeze, cough, or visible cyanosis.  No visible retractions or increased work of breathing.    SKIN: Visible skin clear. No significant rash, abnormal pigmentation or lesions.  NEURO: Cranial nerves grossly intact.  Mentation and speech appropriate for age.  PSYCH: Mentation appears normal, affect  normal/bright, judgement and insight intact, normal speech and appearance well-groomed.        Video-Visit Details    Type of service:  Video Visit    Video End Time:1:30PM    Originating Location (pt. Location): Home    [facility]     Platform used for Video Visit: [employer]

## 2022-02-12 LAB — SARS-COV-2 RNA RESP QL NAA+PROBE: NEGATIVE

## 2022-02-12 ASSESSMENT — PATIENT HEALTH QUESTIONNAIRE - PHQ9: SUM OF ALL RESPONSES TO PHQ QUESTIONS 1-9: 11

## 2022-02-12 ASSESSMENT — ANXIETY QUESTIONNAIRES: GAD7 TOTAL SCORE: 14

## 2022-02-15 ENCOUNTER — VIRTUAL VISIT (OUTPATIENT)
Dept: FAMILY MEDICINE | Facility: CLINIC | Age: 52
End: 2022-02-15
Payer: COMMERCIAL

## 2022-02-15 DIAGNOSIS — K57.32 DIVERTICULITIS OF COLON: Primary | ICD-10-CM

## 2022-02-15 DIAGNOSIS — Z12.11 SCREEN FOR COLON CANCER: ICD-10-CM

## 2022-02-15 DIAGNOSIS — E78.5 HYPERLIPIDEMIA LDL GOAL <100: ICD-10-CM

## 2022-02-15 DIAGNOSIS — Z12.31 ENCOUNTER FOR SCREENING MAMMOGRAM FOR BREAST CANCER: ICD-10-CM

## 2022-02-15 DIAGNOSIS — R73.03 PRE-DIABETES: ICD-10-CM

## 2022-02-15 DIAGNOSIS — F51.01 PRIMARY INSOMNIA: ICD-10-CM

## 2022-02-15 PROCEDURE — 99214 OFFICE O/P EST MOD 30 MIN: CPT | Mod: 95 | Performed by: FAMILY MEDICINE

## 2022-02-15 RX ORDER — METRONIDAZOLE 500 MG/1
500 TABLET ORAL 3 TIMES DAILY
Qty: 30 TABLET | Refills: 0 | Status: SHIPPED | OUTPATIENT
Start: 2022-02-15 | End: 2022-02-25

## 2022-02-15 RX ORDER — SIMVASTATIN 20 MG
20 TABLET ORAL AT BEDTIME
Qty: 90 TABLET | Refills: 0 | Status: SHIPPED | OUTPATIENT
Start: 2022-02-15 | End: 2022-07-19

## 2022-02-15 RX ORDER — CLONAZEPAM 0.5 MG/1
.25-.5 TABLET ORAL
Qty: 45 TABLET | Refills: 2 | Status: SHIPPED | OUTPATIENT
Start: 2022-02-15 | End: 2022-10-04

## 2022-02-15 RX ORDER — CIPROFLOXACIN 500 MG/1
500 TABLET, FILM COATED ORAL 2 TIMES DAILY
Qty: 20 TABLET | Refills: 0 | Status: SHIPPED | OUTPATIENT
Start: 2022-02-15 | End: 2022-02-25

## 2022-02-15 ASSESSMENT — ENCOUNTER SYMPTOMS
ARTHRALGIAS: 0
NAUSEA: 1
VOMITING: 0
HEMATOCHEZIA: 0
FREQUENCY: 0
HEMATURIA: 0
FEVER: 1
DIARRHEA: 1
HEADACHES: 0
DYSURIA: 0
ANOREXIA: 1
FLATUS: 0
MYALGIAS: 0
ABDOMINAL PAIN: 1
BELCHING: 0
CONSTIPATION: 0

## 2022-02-15 NOTE — PATIENT INSTRUCTIONS
Patient Education     Diverticulitis    Some people get pouches along the wall of the colon as they get older. These pouches are called diverticuli. They often cause no symptoms. If the pouches become blocked, you can get an infection. This infection is called diverticulitis. It causes pain in your lower belly (abdomen) and fever. It may also cause nausea, vomiting, diarrhea, or constipation. If not treated, it can become a serious health problem. It can cause an abscess to form inside the pouch. The abscess may block the intestinal tract. It may even rupture, spreading infection throughout the belly.   When treatment is started early, oral antibiotics alone may be enough to cure diverticulitis. This method is tried first. But if you don't improve or if your condition gets worse while using these medicines, you may need to be admitted to the hospital. There, you will get antibiotics through an IV. You may also have to rest your bowel. That means you won't eat or drink for a period of time. Severe cases may need surgery.   Home care  These guidelines will help you care for yourself at home:     During the acute illness, rest and follow your healthcare provider's instructions about diet. Sometimes you will need to be on a clear liquid diet to rest your bowel. Once your symptoms are better, you may be told to eat a low-fiber diet for some time. You can eat foods such as:  ? Flake cereal  ? Mashed potatoes  ? Pancakes and waffles  ? Pasta  ? White bread  ? Rice  ? Applesauce  ? Bananas  ? Eggs  ? Fish  ? Poultry  ? Tofu  ? Cooked soft vegetables    Take antibiotics exactly as told. Don't miss any doses or stop taking the medicine, even if you feel better.    Monitor your temperature. Tell your healthcare provider if you have a rising temperature.  Preventing future attacks  Once you have an episode of diverticulitis, you are at risk for having it again. But you may be able to lower your risk by eating a high-fiber diet  (20 g/day to 35 g/day of fiber). This cleans out the colon pouches that already exist. It may also prevent new ones from forming. Foods high in fiber include:     Fresh fruits and edible peelings    Raw or lightly cooked vegetables    Whole-grain cereals and breads    Dried beans and peas    Bran  Here are other steps you can take to help prevent future attacks:     Take your medicines, such as antibiotics, as your healthcare provider says.    Drink 6 to 8 glasses of water every day, unless told otherwise.    Use a heating pad or hot water bottle to help belly cramping or pain.    Start an exercise program. Ask your healthcare provider how to get started. You can benefit from simple activities such as walking or gardening.    Treat diarrhea with a bland diet. Start with liquids only. Then slowly add fiber over time.    Watch for changes in your bowel movements (constipation to diarrhea). Prevent constipation by eating a high-fiber diet and taking a stool softener if needed.    Get plenty of rest and sleep.  Follow-up care  Check in with your healthcare provider as advised or sooner if you are not getting better in the next 2 days.   When to call your healthcare provider   Call your healthcare provider right away if any of these occur:    Fever of 100.4 F (38 C) or higher, or as directed by your healthcare provider    Repeated vomiting or swelling of the belly    Weakness, dizziness, light-headedness    Pain in your belly that gets worse, is severe, or spreads to your back    Pain that moves to the right lower belly    Rectal bleeding (stools that are red, black, or maroon in color)    Unexpected vaginal bleeding  [employer] last reviewed this educational content on 8/1/2019 2000-2021 [employer]. All rights reserved. This information is not intended as a substitute for professional medical care. Always follow your healthcare professional's instructions.         Patient Education     Discharge  Instructions for Diverticulitis   You have been diagnosed with diverticulitis. This is a condition in which small pouches form in your colon (large intestine) and become inflamed or infected. Follow the guidelines below for home care.  As you recover  Tips for recovery include:    Eat a low-fiber diet at first while you recover. Your healthcare provider may advise a liquid diet. This gives your bowel a chance to rest so that it can recover.    Foods to include: flake cereal, mashed potatoes, pancakes, waffles, pasta, white bread, rice, applesauce, bananas, eggs, fish, poultry, tofu, and well-cooked vegetables    Take your medicines as directed. Don't stop taking the medicines, even if you feel better.    Monitor your temperature and report any rise in temperature to your healthcare provider.    Take antibiotics exactly as directed. Don't miss any and keep taking them even if you feel better.     Drink 6 to 8 glasses of water every day, unless told otherwise.    Use a heating pad or hot water bottle to reduce abdominal cramping or pain.  Preventing diverticulitis in the future  Tips for prevention include:    Eat a high-fiber diet. Fiber adds bulk to the stool so that it passes through the large intestine more easily.    Keep drinking 6 to 8 glasses of water every day, unless told otherwise.    Start an exercise program. Ask your healthcare provider how to get started. You can benefit from simple activities such as walking or gardening.    Treat diarrhea with a bland diet. Start with liquids only, then slowly add fiber over time.    Watch for changes in your bowel movements (constipation to diarrhea).    Prevent constipation with fiber and add a stool softener if needed.     Get plenty of rest and sleep.  Follow-up care  Make a follow-up appointment, or as advised. Your provider may recommend a colonoscopy or other imaging test of your colon.  When to call your healthcare provider  Call your healthcare provider  immediately if you have any of the following:    Fever of 100.4 F (38.0 C) or higher, or as directed by your healthcare provider    Chills    Severe cramps in the belly, most commonly the lower left side    Tenderness in the belly, most commonly the lower left side    Nausea and vomiting    Bleeding from your rectum  [employer] last reviewed this educational content on 6/1/2019 2000-2021 [employer]. All rights reserved. This information is not intended as a substitute for professional medical care. Always follow your healthcare professional's instructions.

## 2022-02-17 ENCOUNTER — TELEPHONE (OUTPATIENT)
Dept: GASTROENTEROLOGY | Facility: CLINIC | Age: 52
End: 2022-02-17
Payer: COMMERCIAL

## 2022-02-17 ENCOUNTER — TELEPHONE (OUTPATIENT)
Dept: FAMILY MEDICINE | Facility: CLINIC | Age: 52
End: 2022-02-17
Payer: COMMERCIAL

## 2022-02-17 DIAGNOSIS — Z11.59 ENCOUNTER FOR SCREENING FOR OTHER VIRAL DISEASES: Primary | ICD-10-CM

## 2022-02-17 NOTE — TELEPHONE ENCOUNTER
Screening Questions  Blue=prep questions Red=location Green=sedation   1. Are you active on mychart? Y    What insurance is in the chart?  [employer] [place]  2.     3.  Ordering/Referring Provider: Roshan Pelayo MD    4. BMI 40.6, If greater than 40 review exclusion criteria also will need EXTENDED PREP    5.  Respiratory Screening (If yes to any of the following HOSPITAL setting only):     Do you use daily home oxygen? N  Do you have mod to severe Obstructive Sleep Apnea? N (can be seen at [facility] or hospital setting)    Do you have Pulmonary Hypertension? N   Do you have UNCONTROLLED asthma? N    6. Have you had a heart or lung transplant? N  (If yes, please review exclusion criteria)    7. Are you currently on dialysis?N  (If yes, schedule in HOSPITAL setting only)(If yes, please send Golytely prep)    8. Do you have chronic kidney disease? N (If yes, please send Golytely prep)    9. Have you had a stroke or Transient ischemic attack (TIA) within 6 months? N (If yes, do not schedule at [facility])    10. In the past 6 months, have you had any heart related issues including cardiomyopathy or heart attack? N (If yes, please review exclusion criteria)           If yes, did it require cardiac stenting or other implantable device?N  (If yes, please review exclusion criteria)      11. Do you have any implantable devices in your body (pacemaker, defib, LVAD)? N (If yes, schedule at [facility])    12. Do you take nitroglycerin? If yes, how often? N (if yes, schedule at HOSPITAL setting)    13. Are you currently taking any blood thinners?N (If yes- inform patient to follow up with PCP or provider for follow up instructions)     14. Are you a diabetic? N (If yes, please send Golytely prep)    15. (Females) Are you currently pregnant? N  If yes, how many weeks?      16. Are you taking any prescription pain medications on a routine schedule? N If yes, MAC sedation and patient will need EXTENDED PREP.    17. Do you have any chemical  dependencies such as alcohol, street drugs, or methadone? N If yes, MAC sedation     18. Do you have any history of post-traumatic stress syndrome, severe anxiety or history of psychosis?Y- Has  Leukocytosis- does not want deep sedation as how her body process anesthesia.     If yes, MAC sedation.     19. Do you transfer independently? Y    20.  Do you have any issues with constipation? N   If yes, pt will need EXTENDED PREP     21. Preferred Pharmacy for Pre Prescription [facility]    Scheduling Details    Which Colonoscopy Prep was Sent?: MIRALAX  Type of Procedure Scheduled: COLONOSCOPY  Surgeon: CRISTIAN  Date of Procedure: 3/10  Location:   Caller (Please ask for phone number if not scheduled by patient): SELF      Sedation Type: CONSCIOUS  Conscious Sedation- Needs  for 6 hours after the procedure  MAC/General-Needs  for 24 hours after procedure    Pre-op Required at [facility] [place], [place] and OR for MAC sedation: N  (if yes advise patient they will need a pre-op prior to procedure)      Informed patient they will need an adult  Y  Cannot take any type of public or medical transportation alone    Pre-Procedure Covid test to be completed at [facility] or Externally: [facility]    Confirmed Nurse will call to complete assessment Y    Additional comments: N ([facility] PATIENTS NEED EXTENDED PREP)

## 2022-02-17 NOTE — TELEPHONE ENCOUNTER
Spoke with patient notified her that disability parking permit is ready for  at [facility] , copy has also been emailed to her at [email or web address]    Closing encounter  Madie COYLE (R)

## 2022-02-19 ENCOUNTER — HEALTH MAINTENANCE LETTER (OUTPATIENT)
Age: 52
End: 2022-02-19

## 2022-03-03 RX ORDER — BISACODYL 5 MG
10 TABLET, DELAYED RELEASE (ENTERIC COATED) ORAL SEE ADMIN INSTRUCTIONS
Qty: 2 TABLET | Refills: 0 | Status: SHIPPED | OUTPATIENT
Start: 2022-03-03 | End: 2022-04-29

## 2022-03-07 ENCOUNTER — LAB (OUTPATIENT)
Dept: URGENT CARE | Facility: URGENT CARE | Age: 52
End: 2022-03-07
Payer: COMMERCIAL

## 2022-03-07 DIAGNOSIS — Z11.59 ENCOUNTER FOR SCREENING FOR OTHER VIRAL DISEASES: ICD-10-CM

## 2022-03-07 PROCEDURE — U0003 INFECTIOUS AGENT DETECTION BY NUCLEIC ACID (DNA OR RNA); SEVERE ACUTE RESPIRATORY SYNDROME CORONAVIRUS 2 (SARS-COV-2) (CORONAVIRUS DISEASE [COVID-19]), AMPLIFIED PROBE TECHNIQUE, MAKING USE OF HIGH THROUGHPUT TECHNOLOGIES AS DESCRIBED BY CMS-2020-01-R: HCPCS

## 2022-03-07 PROCEDURE — U0005 INFEC AGEN DETEC AMPLI PROBE: HCPCS

## 2022-03-08 LAB — SARS-COV-2 RNA RESP QL NAA+PROBE: NEGATIVE

## 2022-03-09 RX ORDER — NALOXONE HYDROCHLORIDE 0.4 MG/ML
0.2 INJECTION, SOLUTION INTRAMUSCULAR; INTRAVENOUS; SUBCUTANEOUS
Status: CANCELLED | OUTPATIENT
Start: 2022-03-09

## 2022-03-09 RX ORDER — NALOXONE HYDROCHLORIDE 0.4 MG/ML
0.4 INJECTION, SOLUTION INTRAMUSCULAR; INTRAVENOUS; SUBCUTANEOUS
Status: CANCELLED | OUTPATIENT
Start: 2022-03-09

## 2022-03-09 RX ORDER — FLUMAZENIL 0.1 MG/ML
0.2 INJECTION, SOLUTION INTRAVENOUS
Status: CANCELLED | OUTPATIENT
Start: 2022-03-09 | End: 2022-03-10

## 2022-03-09 RX ORDER — PROCHLORPERAZINE MALEATE 10 MG
10 TABLET ORAL EVERY 6 HOURS PRN
Status: CANCELLED | OUTPATIENT
Start: 2022-03-09

## 2022-03-09 RX ORDER — ONDANSETRON 4 MG/1
4 TABLET, ORALLY DISINTEGRATING ORAL EVERY 6 HOURS PRN
Status: CANCELLED | OUTPATIENT
Start: 2022-03-09

## 2022-03-09 RX ORDER — ONDANSETRON 2 MG/ML
4 INJECTION INTRAMUSCULAR; INTRAVENOUS EVERY 6 HOURS PRN
Status: CANCELLED | OUTPATIENT
Start: 2022-03-09

## 2022-03-10 ENCOUNTER — HOSPITAL ENCOUNTER (OUTPATIENT)
Facility: AMBULATORY SURGERY CENTER | Age: 52
Discharge: HOME | End: 2022-03-10
Attending: INTERNAL MEDICINE | Admitting: INTERNAL MEDICINE
Payer: COMMERCIAL

## 2022-03-10 VITALS
RESPIRATION RATE: 16 BRPM | HEART RATE: 83 BPM | DIASTOLIC BLOOD PRESSURE: 54 MMHG | SYSTOLIC BLOOD PRESSURE: 117 MMHG | TEMPERATURE: 96.3 F | OXYGEN SATURATION: 99 %

## 2022-03-10 DIAGNOSIS — Z12.11 COLON CANCER SCREENING: ICD-10-CM

## 2022-03-10 LAB — COLONOSCOPY: NORMAL

## 2022-03-10 PROCEDURE — G8907 PT DOC NO EVENTS ON DISCHARG: HCPCS

## 2022-03-10 PROCEDURE — 45385 COLONOSCOPY W/LESION REMOVAL: CPT

## 2022-03-10 PROCEDURE — G8918 PT W/O PREOP ORDER IV AB PRO: HCPCS

## 2022-03-10 RX ORDER — FENTANYL CITRATE 50 UG/ML
INJECTION, SOLUTION INTRAMUSCULAR; INTRAVENOUS PRN
Status: DISCONTINUED | OUTPATIENT
Start: 2022-03-10 | End: 2022-03-10 | Stop reason: HOSPADM

## 2022-03-10 RX ORDER — ONDANSETRON 2 MG/ML
4 INJECTION INTRAMUSCULAR; INTRAVENOUS
Status: DISCONTINUED | OUTPATIENT
Start: 2022-03-10 | End: 2022-03-11 | Stop reason: HOSPADM

## 2022-03-10 RX ORDER — LIDOCAINE 40 MG/G
CREAM TOPICAL
Status: DISCONTINUED | OUTPATIENT
Start: 2022-03-10 | End: 2022-03-11 | Stop reason: HOSPADM
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
## 2022-03-14 LAB
PATH REPORT.COMMENTS IMP SPEC: NORMAL
PATH REPORT.COMMENTS IMP SPEC: NORMAL
PATH REPORT.FINAL DX SPEC: NORMAL
PATH REPORT.GROSS SPEC: NORMAL
PATH REPORT.MICROSCOPIC SPEC OTHER STN: NORMAL
PATH REPORT.RELEVANT HX SPEC: NORMAL
PHOTO IMAGE: NORMAL

## 2022-03-14 PROCEDURE — 88305 TISSUE EXAM BY PATHOLOGIST: CPT | Performed by: PATHOLOGY

## 2022-03-16 PROBLEM — N75.0 BARTHOLIN'S CYST: Status: ACTIVE | Noted: 2020-09-16

## 2022-03-16 PROBLEM — R71.8 RBC MICROCYTOSIS: Status: ACTIVE | Noted: 2021-07-07

## 2022-03-16 PROBLEM — K57.32 DIVERTICULITIS OF COLON: Status: RESOLVED | Noted: 2020-09-01 | Resolved: 2022-03-16

## 2022-03-16 PROBLEM — E78.5 HYPERLIPIDEMIA LDL GOAL <100: Chronic | Status: ACTIVE | Noted: 2021-07-07

## 2022-03-16 PROBLEM — J15.9 PNEUMONIA, BACTERIAL: Status: ACTIVE | Noted: 2020-10-14

## 2022-03-16 PROBLEM — E66.01 MORBID OBESITY (H): Chronic | Status: ACTIVE | Noted: 2020-10-16

## 2022-03-16 PROBLEM — N75.0 BARTHOLIN'S CYST: Status: RESOLVED | Noted: 2020-09-16 | Resolved: 2022-03-16

## 2022-03-16 PROBLEM — J15.9 PNEUMONIA, BACTERIAL: Status: RESOLVED | Noted: 2020-10-14 | Resolved: 2022-03-16

## 2022-03-17 ENCOUNTER — VIRTUAL VISIT (OUTPATIENT)
Dept: SLEEP MEDICINE | Facility: CLINIC | Age: 52
End: 2022-03-17
Attending: OTOLARYNGOLOGY
Payer: COMMERCIAL

## 2022-03-17 VITALS — WEIGHT: 215 LBS | HEIGHT: 61 IN | BODY MASS INDEX: 40.59 KG/M2

## 2022-03-17 DIAGNOSIS — G25.81 RESTLESS LEGS SYNDROME (RLS): ICD-10-CM

## 2022-03-17 DIAGNOSIS — R06.83 SNORING: Primary | ICD-10-CM

## 2022-03-17 DIAGNOSIS — F43.10 PTSD (POST-TRAUMATIC STRESS DISORDER): Chronic | ICD-10-CM

## 2022-03-17 DIAGNOSIS — F51.04 CHRONIC INSOMNIA: ICD-10-CM

## 2022-03-17 DIAGNOSIS — E66.01 MORBID OBESITY (H): Chronic | ICD-10-CM

## 2022-03-17 PROBLEM — F32.9 MAJOR DEPRESSION: Chronic | Status: ACTIVE | Noted: 2020-09-30

## 2022-03-17 PROBLEM — F41.9 ANXIETY: Status: ACTIVE | Noted: 2022-03-17

## 2022-03-17 PROBLEM — F41.9 ANXIETY: Chronic | Status: ACTIVE | Noted: 2022-03-17

## 2022-03-17 PROCEDURE — 99205 OFFICE O/P NEW HI 60 MIN: CPT | Mod: 95 | Performed by: INTERNAL MEDICINE

## 2022-03-17 RX ORDER — PRAMIPEXOLE DIHYDROCHLORIDE 0.12 MG/1
.125-.25 TABLET ORAL AT BEDTIME
Qty: 60 TABLET | Refills: 1 | Status: SHIPPED | OUTPATIENT
Start: 2022-03-17 | End: 2022-06-09

## 2022-03-17 ASSESSMENT — SLEEP AND FATIGUE QUESTIONNAIRES
HOW LIKELY ARE YOU TO NOD OFF OR FALL ASLEEP WHILE SITTING AND TALKING TO SOMEONE: WOULD NEVER DOZE
HOW LIKELY ARE YOU TO NOD OFF OR FALL ASLEEP WHILE LYING DOWN TO REST IN THE AFTERNOON WHEN CIRCUMSTANCES PERMIT: HIGH CHANCE OF DOZING
HOW LIKELY ARE YOU TO NOD OFF OR FALL ASLEEP IN A CAR, WHILE STOPPED FOR A FEW MINUTES IN TRAFFIC: WOULD NEVER DOZE
HOW LIKELY ARE YOU TO NOD OFF OR FALL ASLEEP WHILE SITTING AND READING: MODERATE CHANCE OF DOZING
HOW LIKELY ARE YOU TO NOD OFF OR FALL ASLEEP WHILE SITTING QUIETLY AFTER LUNCH WITHOUT ALCOHOL: WOULD NEVER DOZE
HOW LIKELY ARE YOU TO NOD OFF OR FALL ASLEEP WHILE SITTING INACTIVE IN A PUBLIC PLACE: WOULD NEVER DOZE
HOW LIKELY ARE YOU TO NOD OFF OR FALL ASLEEP WHILE WATCHING TV: HIGH CHANCE OF DOZING
HOW LIKELY ARE YOU TO NOD OFF OR FALL ASLEEP WHEN YOU ARE A PASSENGER IN A CAR FOR AN HOUR WITHOUT A BREAK: WOULD NEVER DOZE

## 2022-03-17 ASSESSMENT — PAIN SCALES - GENERAL: PAINLEVEL: NO PAIN (0)

## 2022-03-17 NOTE — PATIENT INSTRUCTIONS
Iron levels are a bit low, this can cause restless leg symptoms .   Try feso4 (iron) 325mg once every other day for 4-6 months to see if leg symptoms improve.   If you are taking an acid reducing medication for heartburn you may need to add vitamin C 500-1000 mg to aide in absorption.   May need to add a stool softener if iron causes constipation.   Goal ferritin is >75. Recheck in 4-6 months         Insomnia and Behavioral Sleep Medicine Program    The [facility] Insomnia and Behavioral Sleep Medicine Program provides non-drug treatment for sleep problems including:      Cognitive-behavioral Therapies for Insomnia (CBT-I)    Management of Shift-work and Jet Lag    Management of Delayed, Advanced and Irregular Circadian Rhythm Sleep Disorders    Imagery Rehearsal Therapy (IRT) for Nightmare Disorder    PAP Therapy Desensitization    You have been referred for consultation with a sleep psychologist who specializes in behavioral sleep medicine and treatment of insomnia.  The [facility] Insomnia and Behavioral Sleep Medicine Program offers individualized telehealth services through our [facility] and online CBT-I.    Preparing for your Consultation    You will need to keep a Sleep Diary for at least a week prior to your visit. Avoid watching the clock or recording data during the night. Complete the sleep diary each day first thing after you get up by answering a few key questions about your sleep.  Your answers should be based on your recall of the past 24 hours.    Insomnia  Corin    The Insomnia  Corin is a convenient way to keep track of your sleep prior to your sleep consultation.  Simply download the free corin on your Apple or Android phone and record your information each morning.  The corin also includes training, assessment, and sleep schedule recommendations.  For the purposes of your consultation, we recommend you use only the sleep diary function. You can e-mail  yourself a copy of your sleep diary data by going to the Settings section and using the [place] User Data function.  During your consultation your provider will review the data with you.           [employer] Sleep Diary    You can also track your sleep using the [employer] paper sleep diary.  You can fax your sleep diaries to [phone] prior to your consultation or have it with you at the time of your consultation.            CBT-I:  Frequently Asked Questions    What is CBT-I?    Cognitive Behavioral Therapy for Insomnia, also known as CBT-I, is a highly effective non-drug treatment for insomnia. The American College of Physicians recommends CBT-I as the first treatment for chronic insomnia.  Research has shown CBT-I to be safer and more effective long term than sleeping pills.    What does CBT-I involve?     CBT-I targets behaviors that lead to chronic insomnia:    Habits that weaken the bed as a cue for sleep    Habits that weaken your body's sleep drive and sleep/wake clock     Unhelpful sleep thoughts that increase sleep-related worry and arousal.    The process works like a 4-6 session training program that provides you with the information and coaching needed to implement proven strategies to get a better night's sleep.    People often see improvement in their sleep within a few weeks. Research shows if you keep practicing the skills you learn your sleep is likely to continue to improve 6-12 months after treatment.    Does this program prescribe or manage sleep medication?    No.  Your prescribing provider is responsible to assist you in managing your sleep medications.  Some people choose to stop using sleep medication prior to or during CBT-I.  Our program can work with your prescribing provider to help reduce or eliminate use of sleep medications.     Getting Started Today!    As you prepare for your sleep consultation, we recommend you consider making the following changes to your sleep  habits if you haven't already done so:    ? Reduce your consumption of caffeine and alcohol.  Both can disrupt sleep and make strengthening your sleep more difficult.  Specifically:    - Avoid caffeine within 6 hours of bedtime   - No more than 3 caffeinated beverages per day (e.g. 8 oz. cup coffee or 12 oz. cup soda)            - No alcohol within 3 hours of bedtime    ? Make sure your bedroom is quiet, comfortable and dark.  Noise, light and an uncomfortable sleep space can harm your sleep.      ? Keep the same sleep schedule 7 days a week.unless you do shift work.      Corin and Online CBT-I     If you want to get started today, research indicates that corin-based and online CBT-I can be effective for some individuals. There programs requires comfort with corin-based or online learning and confidence that making changes in sleep habits can improve your sleep. However, digital CBT-I programs are not for everyone.  Contraindications include:       Seizure disorders,     Bipolar disorder,     Unstable medical or mental health conditions,     Frailty or risk of falling    Pregnant    You should consult a sleep specialist before using these resources if you have:      Sleep Apnea    Restless Leg Syndrome    Sleep Walking    REM behavior disorder    Night Terrors    Excessive Daytime Sleepiness    Are engaged in shift work    Or are using prescription sleep medication    Insomnia  training program    Insomnia  is a mobile corin developed by the [employer] involving a core 5-week training program.  The application is downloadable for free on [employer] and Apple phones.             Our Online CBT-I program    Our online CBT-I program is formatted for use on computers and mobile devices.  The cost for an entire 6-week program is $40.         If you opt to try online CBT-I prior to your sleep psychology consultation, we ask that you sign a consent for the [facility] to share your online CBT-I  information with our sleep program.  This can be done by entering the sharing code [email or web address].    To get started, copy and paste the link below which will take you to the landing page to register:            [email or web address]                                 Self-help workbooks for insomnia    If you have found self-help books useful in the past, you may want to consider reading one of the following books prior to your consultation:      Say Lindy to Insomnia: The Six-Week, Drug-Free Program Developed at [facility].  Jaime Khanna MD. Available in paperback, Callie, and audiobook.      Overcoming Insomnia: A Cognitive-Behavioral Therapy Approach, Workbook.  Jamie Fraga, PhD  and Zakiya Hernandez, PhD.  Available in paperback and Callie.      Quiet Your Mind and Get to Sleep: Solutions to Insomnia for Those with Depression, Anxiety, or Chronic Pain.  Alisa Cates, PhD and Zakiya Hernandez, PhD.  Available in paperback and Callie
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
## 2022-03-17 NOTE — PROGRESS NOTES
"Jessie Seymour is a 51 year old female being evaluated via a billable telephone visit.     \"This telephone visit will be conducted via a call between you and your physician/provider. We have found that certain health care needs can be provided without the need for an in-person visit or physical exam.  This service lets us provide the care you need with a telephone conversation.  If a prescription is necessary we can send it directly to your pharmacy.  If lab work is needed we can place an order for that and you can then stop by our lab to have the test done at a later time.\"    Telephone visits are billed at different rates depending on your insurance coverage.  Please reach out to your insurance provider with any questions.    Patient has given verbal consent for  a Telephone visit? Yes    What telephone number would you like your provider to contact at at:  [phone]    How would you like to obtain your AVS? Kamilah Lovell    Telephone Visit Details:     Telephone Visit Start Time: 8:50 AM    Telephone Visit End Time:9:50 AM      "

## 2022-03-22 ENCOUNTER — TELEPHONE (OUTPATIENT)
Dept: SLEEP MEDICINE | Facility: CLINIC | Age: 52
End: 2022-03-22
Payer: COMMERCIAL

## 2022-04-05 ENCOUNTER — MYC REFILL (OUTPATIENT)
Dept: FAMILY MEDICINE | Facility: CLINIC | Age: 52
End: 2022-04-05
Payer: COMMERCIAL

## 2022-04-05 DIAGNOSIS — G43.009 MIGRAINE WITHOUT AURA AND WITHOUT STATUS MIGRAINOSUS, NOT INTRACTABLE: ICD-10-CM

## 2022-04-05 RX ORDER — KETOROLAC TROMETHAMINE 10 MG/1
10 TABLET, FILM COATED ORAL EVERY 6 HOURS PRN
Qty: 4 TABLET | Refills: 0 | Status: CANCELLED | OUTPATIENT
Start: 2022-04-05

## 2022-04-05 RX ORDER — KETOROLAC TROMETHAMINE 10 MG/1
TABLET, FILM COATED ORAL
Qty: 4 TABLET | Refills: 0 | Status: SHIPPED | OUTPATIENT
Start: 2022-04-05 | End: 2023-06-06

## 2022-04-05 NOTE — TELEPHONE ENCOUNTER
Pending Prescriptions:                       Disp   Refills    ketorolac (TORADOL) 10 MG tablet [Pharmacy*4 tabl*0        Sig: TAKE ONE TABLET BY MOUTH EVERY 6 HOURS AS NEEDED FOR           PAIN    Routing refill request to provider for review/approval because:  Drug not on the [facility] refill protocol   A break in medication  Last filled 07/07/2021    Requested Prescriptions   Pending Prescriptions Disp Refills    ketorolac (TORADOL) 10 MG tablet [Pharmacy Med Name: KETOROLAC TROMETH 10MG TABS] 4 tablet 0     Sig: TAKE ONE TABLET BY MOUTH EVERY 6 HOURS AS NEEDED FOR PAIN        There is no refill protocol information for this order

## 2022-04-06 ENCOUNTER — MYC MEDICAL ADVICE (OUTPATIENT)
Dept: FAMILY MEDICINE | Facility: CLINIC | Age: 52
End: 2022-04-06
Payer: COMMERCIAL

## 2022-04-06 NOTE — LETTER
[facility] [place]  [address], [address]  [place] [address]  [phone]       April 20, 2022    Jessie Seymour  [address] [address]    Dear Jacy,    This questionnaire is about depression for your upcoming visit or contact, and your care team may not see this information before then.  We care about you.  If at any time you feel unsafe or have concerns about the safety of others please take immediate action by calling [phone], for mental health crisis phone support 24 hours a day, 365 days per year.  As always, you can also go to your local ER, or call 911 if you have immediate safety concerns.    Please complete the enclosed questionnaire and return to us at the address above.    Thank you for trusting [facility] [place] and we appreciate the opportunity to serve you.  We look forward to supporting your healthcare needs in the future.    Healthy Regards,    Your [facility]

## 2022-04-06 NOTE — TELEPHONE ENCOUNTER
Patient Quality Outreach    Patient is due for the following:   Depression  -  PHQ-9 Needed due by 5/14/22    NEXT STEPS:   No follow up needed at this time.    Type of outreach:    Sent [employer] message.      Questions for provider review:    None     Kimberly Neil, CMA

## 2022-04-13 NOTE — TELEPHONE ENCOUNTER
Patient Quality Outreach    Patient is due for the following:   Depression  -  PHQ-9 Needed due by 5/14/22    NEXT STEPS:   No follow up needed at this time.    Type of outreach:    Call to complete PHQ9/BON  Route back to me if unable to reach. Can close if schedules or declines.    Next Steps:  Reach out within 90 days via [employer].    Max number of attempts reached: Yes. Will try again in 90 days if patient still on fail list.    Questions for provider review:    None     Kimberly Neil CMA  Chart routed to Care Team.

## 2022-04-13 NOTE — TELEPHONE ENCOUNTER
Called and spoke with patient and she states she will do this on mychart later. Josseline Girard, CMA

## 2022-04-23 ENCOUNTER — LAB REQUISITION (OUTPATIENT)
Dept: LAB | Facility: CLINIC | Age: 52
End: 2022-04-23

## 2022-04-23 PROCEDURE — U0005 INFEC AGEN DETEC AMPLI PROBE: HCPCS | Performed by: INTERNAL MEDICINE

## 2022-04-24 LAB — SARS-COV-2 RNA RESP QL NAA+PROBE: NEGATIVE

## 2022-04-28 DIAGNOSIS — J45.22 MILD INTERMITTENT ASTHMA WITH STATUS ASTHMATICUS: Primary | ICD-10-CM

## 2022-04-28 DIAGNOSIS — J01.01 ACUTE RECURRENT MAXILLARY SINUSITIS: ICD-10-CM

## 2022-04-28 NOTE — TELEPHONE ENCOUNTER
"Patient requesting a prescription for prednisone and an antibiotic (Z-Pack) for \"asthma\". No fever, + Cough, COVID negative.  Requesting Prednisone be sent tonight, med listed as historical.   Patient states she is currently in an UC, has been waiting 2+ hours, so would rather just see PCP.     Notified patient she needs a visit for antibiotic. Virtual apt scheduled for tomorrow with PCP.   Refill request sent high priority to team. Notified her provider might not get to it this evening.     Michelle Lundy RN on 4/28/2022 at 6:02 PM      "

## 2022-04-28 NOTE — PROGRESS NOTES
"Jacy is a 51 year old who is being evaluated via a billable telephone visit.      What phone number would you like to be contacted at? [phone]  How would you like to obtain your AVS? Ivonnehart    Assessment & Plan     Acute recurrent maxillary sinusitis  Symptomatic therapy suggested: push fluids, rest, use vaporizer or mist needed , use acetaminophen, ibuprofen, antihistamine-decongestant of choice as needed and Return office visit if symptoms persist or worsen.   - azithromycin (ZITHROMAX) 250 MG tablet; Take 2 tablets (500 mg) by mouth daily for 1 day, THEN 1 tablet (250 mg) daily for 4 days.    Alpha-thalassemia (H)  Has seen hematology    Mild major depression (H)  Stable, continue present management.       BMI:   Estimated body mass index is 40.62 kg/m  as calculated from the following:    Height as of 3/17/22: 1.549 m (5' 1\").    Weight as of 3/17/22: 97.5 kg (215 lb).   Weight management plan: Discussed healthy diet and exercise guidelines        No follow-ups on file.    Roshan Pelayo MD  [facility] QUINCY Louie is a 51 year old who presents for the following health issues         History of Present Illness     Asthma:  She presents for follow up of asthma.  She has some cough, no wheezing, and some shortness of breath. She is using a relief medication a few times a week. She typically misses taking her controller medication 2 time(s) per week.Patient is aware of the following triggers: cold air, exercise or sports, pollens and smoke. The patient has had a visit to the Emergency Room, Urgent Care or Hospital due to asthma since the last clinic visit. She has been to the Emergency Room or Urgent Care 2 times.She has had a Hospitalization 0 times.    Mental Health Follow-up:                    Today's PHQ-9         PHQ-9 Total Score: 11  PHQ-9 Q9 Thoughts of better off dead/self-harm past 2 weeks :   (P) Not at all    How difficult have these problems made it for you to " do your work, take care of things at home, or get along with other people: Somewhat difficult    Today's BON-7 Score: 11    She eats 2-3 servings of fruits and vegetables daily.She consumes 2 sweetened beverage(s) daily.She exercises with enough effort to increase her heart rate 20 to 29 minutes per day.  She exercises with enough effort to increase her heart rate 3 or less days per week. She is missing 1 dose(s) of medications per week.  She is not taking prescribed medications regularly due to remembering to take.       ED/UC Followup:    Facility:  [facility] [place]  Date of visit: 4/28/2022  Reason for visit: SOB   Current Status: improved with prednisone           Review of Systems         Objective    Vitals - Patient Reported  Pain Score: No Pain (1)  Pain Loc: Chest      Vitals:  No vitals were obtained today due to virtual visit.    Physical Exam   healthy, alert and no distress  PSYCH: Alert and oriented times 3; coherent speech, normal   rate and volume, able to articulate logical thoughts, able   to abstract reason, no tangential thoughts, no hallucinations   or delusions  Her affect is normal  RESP: No cough, no audible wheezing, able to talk in full sentences  Remainder of exam unable to be completed due to telephone visits        Phone call duration: 15 minutes

## 2022-04-29 ENCOUNTER — VIRTUAL VISIT (OUTPATIENT)
Dept: FAMILY MEDICINE | Facility: CLINIC | Age: 52
End: 2022-04-29
Payer: COMMERCIAL

## 2022-04-29 DIAGNOSIS — J01.01 ACUTE RECURRENT MAXILLARY SINUSITIS: Primary | ICD-10-CM

## 2022-04-29 DIAGNOSIS — D56.0 ALPHA-THALASSEMIA (H): ICD-10-CM

## 2022-04-29 DIAGNOSIS — F32.0 MILD MAJOR DEPRESSION (H): ICD-10-CM

## 2022-04-29 PROCEDURE — 99214 OFFICE O/P EST MOD 30 MIN: CPT | Mod: 95 | Performed by: FAMILY MEDICINE

## 2022-04-29 RX ORDER — AZITHROMYCIN 250 MG/1
TABLET, FILM COATED ORAL
Qty: 6 TABLET | Refills: 0 | COMMUNITY
Start: 2022-04-29 | End: 2022-07-19

## 2022-04-29 RX ORDER — AZITHROMYCIN 250 MG/1
TABLET, FILM COATED ORAL
Qty: 6 TABLET | Refills: 0 | Status: SHIPPED | OUTPATIENT
Start: 2022-04-29 | End: 2022-04-29

## 2022-04-29 RX ORDER — PREDNISONE 10 MG/1
10 TABLET ORAL DAILY
Qty: 30 TABLET | Refills: 1 | Status: SHIPPED | OUTPATIENT
Start: 2022-04-29 | End: 2022-07-19

## 2022-04-29 ASSESSMENT — ANXIETY QUESTIONNAIRES
GAD7 TOTAL SCORE: 11
7. FEELING AFRAID AS IF SOMETHING AWFUL MIGHT HAPPEN: SEVERAL DAYS
GAD7 TOTAL SCORE: 11
3. WORRYING TOO MUCH ABOUT DIFFERENT THINGS: MORE THAN HALF THE DAYS
6. BECOMING EASILY ANNOYED OR IRRITABLE: MORE THAN HALF THE DAYS
2. NOT BEING ABLE TO STOP OR CONTROL WORRYING: MORE THAN HALF THE DAYS
4. TROUBLE RELAXING: SEVERAL DAYS
1. FEELING NERVOUS, ANXIOUS, OR ON EDGE: MORE THAN HALF THE DAYS
7. FEELING AFRAID AS IF SOMETHING AWFUL MIGHT HAPPEN: SEVERAL DAYS
GAD7 TOTAL SCORE: 11
5. BEING SO RESTLESS THAT IT IS HARD TO SIT STILL: SEVERAL DAYS

## 2022-04-29 ASSESSMENT — PATIENT HEALTH QUESTIONNAIRE - PHQ9
SUM OF ALL RESPONSES TO PHQ QUESTIONS 1-9: 11
SUM OF ALL RESPONSES TO PHQ QUESTIONS 1-9: 11
10. IF YOU CHECKED OFF ANY PROBLEMS, HOW DIFFICULT HAVE THESE PROBLEMS MADE IT FOR YOU TO DO YOUR WORK, TAKE CARE OF THINGS AT HOME, OR GET ALONG WITH OTHER PEOPLE: SOMEWHAT DIFFICULT

## 2022-04-29 ASSESSMENT — PAIN SCALES - GENERAL: PAINLEVEL: NO PAIN (1)

## 2022-04-30 ASSESSMENT — PATIENT HEALTH QUESTIONNAIRE - PHQ9: SUM OF ALL RESPONSES TO PHQ QUESTIONS 1-9: 11

## 2022-04-30 ASSESSMENT — ANXIETY QUESTIONNAIRES: GAD7 TOTAL SCORE: 11

## 2022-05-20 ENCOUNTER — THERAPY VISIT (OUTPATIENT)
Dept: SLEEP MEDICINE | Facility: CLINIC | Age: 52
End: 2022-05-20
Attending: INTERNAL MEDICINE
Payer: COMMERCIAL

## 2022-05-20 DIAGNOSIS — F51.04 CHRONIC INSOMNIA: ICD-10-CM

## 2022-05-20 DIAGNOSIS — R06.83 SNORING: ICD-10-CM

## 2022-05-20 DIAGNOSIS — E66.01 MORBID OBESITY (H): Chronic | ICD-10-CM

## 2022-05-20 DIAGNOSIS — G25.81 RESTLESS LEGS SYNDROME (RLS): ICD-10-CM

## 2022-05-20 DIAGNOSIS — F43.10 PTSD (POST-TRAUMATIC STRESS DISORDER): Chronic | ICD-10-CM

## 2022-05-20 PROCEDURE — 95810 POLYSOM 6/> YRS 4/> PARAM: CPT | Performed by: INTERNAL MEDICINE

## 2022-05-25 PROBLEM — G47.33 OSA (OBSTRUCTIVE SLEEP APNEA): Chronic | Status: ACTIVE | Noted: 2022-05-25

## 2022-05-25 NOTE — PROCEDURES
" SLEEP STUDY INTERPRETATION  DIAGNOSTIC POLYSOMNOGRAPHY REPORT      Patient: CAITIE ESCAMILLA  YOB: 1970  Study Date: 5/20/2022  MRN: [id number]  Referring Provider: Katharine Tony MD  Ordering Provider: Chris Mireles MD    Indications for Polysomnography: The patient is a 51 year old Female who is 5' 1\" and weighs 215.0 lbs. Her BMI is 41.1, [place] sleepiness scale 8 and neck circumference is 42 cm. A diagnostic polysomnogram was performed to evaluate for snoring, mild fatigue (ESS 8), sleep maintenance difficulties, obesity    Polysomnogram Data: A full night polysomnogram recorded the standard physiologic parameters including EEG, EOG, EMG, ECG, nasal and oral airflow. Respiratory parameters of chest and abdominal movements were recorded with respiratory inductance plethysmography. Oxygen saturation was recorded by pulse oximetry. Hypopnea scoring rule used: 1B 4%.    Sleep Architecture:   The total recording time of the polysomnogram was 474.1 minutes. The total sleep time was 433.5 minutes. Sleep latency was decreased at 7.2 minutes with the use of a sleep aid (clonazepam and mirapex). REM latency was 145.0 minutes. Arousal index was 20.6 arousals per hour. Sleep efficiency was normal at 91.4%. Wake after sleep onset was 33.0 minutes. The patient spent 15.9% of total sleep time in Stage N1, 52.2% in Stage N2, 13.1% in Stage N3, and 18.7% in REM. Time in REM supine was 39.0 minutes.    Respiration:     Events ? The polysomnogram revealed a presence of 15 obstructive, 0 central, and 2 mixed apneas resulting in an apnea index of 2.4 events per hour. There were 51 obstructive hypopneas and 0 central hypopneas resulting in an obstructive hypopnea index of 7.1 and central hypopnea index of 0 events per hour. The combined apnea/hypopnea index was 9.4 events per hour (central apnea/hypopnea index was 0 events per hour). The REM AHI was 36.3 events per hour. The supine AHI was 9.7 events per hour. The " RERA index was 1.2 events per hour.  The RDI was 10.7 events per hour.    Snoring - was reported as mild to loud.    Respiratory rate and pattern - was notable for normal respiratory rate and pattern.    Sustained Sleep Associated Hypoventilation - Transcutaneous carbon dioxide monitoring was not used, however significant hypoventilation was not suggested by oximetry    Sleep Associated Hypoxemia - (Greater than 5 minutes O2 sat at or below 88%) was not present. Baseline oxygen saturation was 92.3%. Lowest oxygen saturation was 78.0%. Time spent less than or equal to 88% was 10.6 minutes. Time spent less than or equal to 89% was 15.9 minutes.    Movement Activity:     Periodic Limb Activity - There were 14 PLMs during the entire study. The PLM index was 1.9 movements per hour. The PLM Arousal Index was 1.2 per hour.    REM EMG Activity - Excessive transient/sustained muscle activity was not present.    Nocturnal Behavior - Abnormal sleep related behaviors were not noted     Bruxism - None apparent.    Cardiac Summary:   The average pulse rate was 83.8 bpm. The minimum pulse rate was 65.0 bpm while the maximum pulse rate was 107.0 bpm.  Arrhythmias were not noted.      Assessment:     Mild obstructive sleep apnea, predominantly REM-related    Recommendations:    Based on the presence of mild/moderate obstructive sleep apnea and symptoms or comotbidities, treatment could be empirically initiated with Auto?titrating PAP therapy with a range of 5 to 15 cmH2O. Recommend clinical follow up with sleep management team.    Patient may be a candidate for dental appliance through referral to Sleep Dentistry for the treatment of obstructive sleep apnea and/or socially disruptive snoring.    If devices are not acceptable or effective, patient may benefit from evaluation of possible surgical options. If she is interested, would recommend referral to specialized ENT-Sleep provider.    Advice regarding the risks of drowsy  driving.    Suggest optimizing sleep schedule and avoiding sleep deprivation.    Weight management (if BMI > 30).    Pharmacologic therapy should be used for management of restless legs syndrome only if present and clinically indicated and not based on the presence of periodic limb movements alone.    Diagnostic Codes:   Obstructive Sleep Apnea G47.33  Sleep Hypoxemia/Hypoventilation G47.36         _____________________________________   Electronically Signed By: Chris Mireles MD 5/25/22           Range(%) Time in range (min)   0.0 - 89.0 15.9   0.0 - 88.0 10.6         Stage Min(mm Hg) Max(mm Hg)   Wake - -   NREM(1+2+3) - -   REM - -       Range(mmHg) Time in range (min)   55.0 - 100.0 -   Excluded data <20.0 & >65.0 474.5

## 2022-05-26 LAB — SLPCOMP: NORMAL

## 2022-06-09 ENCOUNTER — VIRTUAL VISIT (OUTPATIENT)
Dept: SLEEP MEDICINE | Facility: CLINIC | Age: 52
End: 2022-06-09
Payer: COMMERCIAL

## 2022-06-09 VITALS — WEIGHT: 218 LBS | BODY MASS INDEX: 41.16 KG/M2 | HEIGHT: 61 IN

## 2022-06-09 DIAGNOSIS — F51.04 CHRONIC INSOMNIA: ICD-10-CM

## 2022-06-09 DIAGNOSIS — E66.01 MORBID OBESITY (H): ICD-10-CM

## 2022-06-09 DIAGNOSIS — G25.81 RESTLESS LEGS SYNDROME (RLS): ICD-10-CM

## 2022-06-09 DIAGNOSIS — G47.33 OSA (OBSTRUCTIVE SLEEP APNEA): Primary | ICD-10-CM

## 2022-06-09 PROCEDURE — 99214 OFFICE O/P EST MOD 30 MIN: CPT | Mod: 95 | Performed by: INTERNAL MEDICINE

## 2022-06-09 RX ORDER — PRAMIPEXOLE DIHYDROCHLORIDE 0.12 MG/1
.125-.25 TABLET ORAL AT BEDTIME
Qty: 180 TABLET | Refills: 3 | Status: SHIPPED | OUTPATIENT
Start: 2022-06-09 | End: 2023-07-11
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
## 2022-06-13 ENCOUNTER — OFFICE VISIT (OUTPATIENT)
Dept: URGENT CARE | Facility: URGENT CARE | Age: 52
End: 2022-06-13
Payer: COMMERCIAL

## 2022-06-13 VITALS
DIASTOLIC BLOOD PRESSURE: 77 MMHG | OXYGEN SATURATION: 98 % | HEIGHT: 61 IN | WEIGHT: 227.6 LBS | SYSTOLIC BLOOD PRESSURE: 107 MMHG | HEART RATE: 85 BPM | TEMPERATURE: 98.2 F | BODY MASS INDEX: 42.97 KG/M2

## 2022-06-13 DIAGNOSIS — J02.9 SORE THROAT: Primary | ICD-10-CM

## 2022-06-13 DIAGNOSIS — Z20.822 EXPOSURE TO 2019 NOVEL CORONAVIRUS: ICD-10-CM

## 2022-06-13 LAB
DEPRECATED S PYO AG THROAT QL EIA: NEGATIVE
GROUP A STREP BY PCR: NOT DETECTED
SARS-COV-2 RNA RESP QL NAA+PROBE: NEGATIVE

## 2022-06-13 PROCEDURE — U0003 INFECTIOUS AGENT DETECTION BY NUCLEIC ACID (DNA OR RNA); SEVERE ACUTE RESPIRATORY SYNDROME CORONAVIRUS 2 (SARS-COV-2) (CORONAVIRUS DISEASE [COVID-19]), AMPLIFIED PROBE TECHNIQUE, MAKING USE OF HIGH THROUGHPUT TECHNOLOGIES AS DESCRIBED BY CMS-2020-01-R: HCPCS | Performed by: PHYSICIAN ASSISTANT

## 2022-06-13 PROCEDURE — 87651 STREP A DNA AMP PROBE: CPT | Performed by: PHYSICIAN ASSISTANT

## 2022-06-13 PROCEDURE — 99213 OFFICE O/P EST LOW 20 MIN: CPT | Mod: CS | Performed by: PHYSICIAN ASSISTANT

## 2022-06-13 PROCEDURE — U0005 INFEC AGEN DETEC AMPLI PROBE: HCPCS | Performed by: PHYSICIAN ASSISTANT

## 2022-06-13 RX ORDER — LIDOCAINE HYDROCHLORIDE 20 MG/ML
15 SOLUTION OROPHARYNGEAL
Qty: 100 ML | Refills: 0 | Status: SHIPPED | OUTPATIENT
Start: 2022-06-13

## 2022-06-13 ASSESSMENT — ENCOUNTER SYMPTOMS
SINUS PAIN: 0
PALPITATIONS: 0
SINUS PRESSURE: 0
SORE THROAT: 1
SHORTNESS OF BREATH: 0
COUGH: 1
GASTROINTESTINAL NEGATIVE: 1
CHILLS: 0
WHEEZING: 0
FATIGUE: 0
CONSTITUTIONAL NEGATIVE: 1
RHINORRHEA: 1
FEVER: 0
CARDIOVASCULAR NEGATIVE: 1

## 2022-06-13 NOTE — PROGRESS NOTES
Alejandra Louie is a 51 year old who presents for the following health issues   HPI   Acute Illness  Acute illness concerns:  Sore throat  Onset/Duration: >1week  Symptoms:  Fever: no  Chills/Sweats: YES  Headache (location?): no  Sinus Pressure: no  Conjunctivitis:  no  Ear Pain: no  Rhinorrhea: YES  Congestion: YES  Sore Throat: YES  Cough: YES - mild  Wheeze: no  Decreased Appetite: no  Nausea: no  Vomiting: no  Diarrhea: no  Dysuria/Freq.: no  Dysuria or Hematuria: no  Fatigue/Achiness: YES  Sick/Strep Exposure: YES- at work   Therapies tried and outcome: rest, fluids, prednisone with some relief    Patient Active Problem List   Diagnosis     Chronic low back pain     MEDICAL HISTORY OF - VERY DIFFICULT LAB DRAW and IV START     Allergic rhinitis     Tobacco use disorder     Family history of breast cancer     Intermittent asthma     Migraine headache     Melasma     Hiatal hernia     Alpha-thalassemia (H)     Aortic regurgitation     Mild major depression (H)     Morbid obesity (H)     Hyperlipidemia LDL goal <100     Pre-diabetes     Anxiety     PTSD (post-traumatic stress disorder)     Chronic insomnia     Restless legs syndrome (RLS)     AGUSTIN (obstructive sleep apnea)- mild (AHI 9)     Current Outpatient Medications   Medication     acetaminophen (TYLENOL) 325 MG tablet     albuterol (PROAIR HFA) 108 (90 Base) MCG/ACT inhaler     albuterol (PROVENTIL) (2.5 MG/3ML) 0.083% neb solution     aspirin 81 MG tablet     azithromycin (ZITHROMAX) 250 MG tablet     camphor-menthol (SARNA) 0.5-0.5 % external lotion     clonazePAM (KLONOPIN) 0.5 MG tablet     diphenhydrAMINE (BENADRYL) 25 MG tablet     fluticasone (FLONASE) 50 MCG/ACT nasal spray     fluticasone-salmeterol (ADVAIR) 250-50 MCG/DOSE inhaler     hydrocortisone valerate (WEST-LESLY) 0.2 % external ointment     ibuprofen (ADVIL/MOTRIN) 600 MG tablet     ipratropium - albuterol 0.5 mg/2.5 mg/3 mL (DUONEB) 0.5-2.5 (3) MG/3ML neb solution     ketoconazole  "(NIZORAL) 2 % external cream     ketorolac (TORADOL) 10 MG tablet     melatonin 3 MG tablet     metroNIDAZOLE (METROCREAM) 0.75 % external cream     mometasone (ELOCON) 0.1 % external cream     nortriptyline (PAMELOR) 10 MG capsule     omeprazole (PRILOSEC) 10 MG DR capsule     pramipexole (MIRAPEX) 0.125 MG tablet     predniSONE (DELTASONE) 10 MG tablet     simvastatin (ZOCOR) 20 MG tablet     No current facility-administered medications for this visit.        Allergies   Allergen Reactions     Loratadine Other (See Comments)     Dries her up and gets rare sinus infection     Morphine Itching     PN: LW Reaction: Itching, Pruritis     Oxycodone Nausea and Vomiting and Itching     Other reaction(s): Diaphoresis  Other reaction(s): Diaphoresis     Sumatriptan      Worsens migraine     Zofran [Ondansetron]      CARDIO recommended never to take this medication      Codeine Anxiety     Becomes tearful     Hydrocodone-Acetaminophen Anxiety         Review of Systems   Constitutional: Negative.  Negative for chills, fatigue and fever.   HENT: Positive for congestion, rhinorrhea and sore throat. Negative for ear discharge, ear pain, hearing loss, sinus pressure and sinus pain.    Respiratory: Positive for cough. Negative for shortness of breath and wheezing.    Cardiovascular: Negative.  Negative for chest pain, palpitations and peripheral edema.   Gastrointestinal: Negative.    All other systems reviewed and are negative.           Objective    /77 (BP Location: Left arm, Patient Position: Sitting, Cuff Size: Adult Regular)   Pulse 85   Temp 98.2  F (36.8  C) (Tympanic)   Ht 1.549 m (5' 1\")   Wt 103.2 kg (227 lb 9.6 oz)   LMP 06/22/2016 (Exact Date)   SpO2 98%   BMI 43.00 kg/m    Body mass index is 43 kg/m .  Physical Exam  Vitals and nursing note reviewed.   Constitutional:       General: She is not in acute distress.     Appearance: Normal appearance. She is well-developed. She is obese. She is not " ill-appearing.   HENT:      Head: Normocephalic and atraumatic.      Comments: TMs are intact without any erythema or bulging bilaterally.  Airway is patent.     Nose: Nose normal.      Mouth/Throat:      Lips: Pink.      Mouth: Mucous membranes are moist.      Pharynx: Oropharynx is clear. Uvula midline. No pharyngeal swelling, oropharyngeal exudate or posterior oropharyngeal erythema.      Tonsils: No tonsillar exudate.   Eyes:      General: No scleral icterus.     Extraocular Movements: Extraocular movements intact.      Conjunctiva/sclera: Conjunctivae normal.      Pupils: Pupils are equal, round, and reactive to light.   Neck:      Thyroid: No thyromegaly.   Cardiovascular:      Rate and Rhythm: Normal rate and regular rhythm.      Pulses: Normal pulses.      Heart sounds: Normal heart sounds, S1 normal and S2 normal. No murmur heard.    No friction rub. No gallop.   Pulmonary:      Effort: Pulmonary effort is normal. No accessory muscle usage, respiratory distress or retractions.      Breath sounds: Normal breath sounds and air entry. No stridor. No decreased breath sounds, wheezing, rhonchi or rales.   Musculoskeletal:      Cervical back: Normal range of motion and neck supple.   Lymphadenopathy:      Head:      Right side of head: No tonsillar adenopathy.      Left side of head: Tonsillar adenopathy present.      Cervical: No cervical adenopathy.   Skin:     General: Skin is warm and dry.      Nails: There is no clubbing.   Neurological:      Mental Status: She is alert and oriented to person, place, and time.   Psychiatric:         Mood and Affect: Mood normal.         Behavior: Behavior normal.         Thought Content: Thought content normal.         Judgment: Judgment normal.       Results for orders placed or performed in visit on 06/13/22 (from the past 24 hour(s))   Streptococcus A Rapid Screen w/Reflex to PCR - Clinic Collect    Specimen: Throat; Swab   Result Value Ref Range    Group A Strep antigen  Negative Negative         Assessment/Plan:  Sore throat:  RST is negative, will send for strep and covid PCR testing.  Will give lidocaine oral viscous as needed for sore throat.  Recommend tylenol/ibuprofen prn pain/fever, warm salt water gargles, lozenges or cough drops.  Recheck in clinic if symptoms worsen or if symptoms do not improve.    -     Streptococcus A Rapid Screen w/Reflex to PCR - Clinic Collect  -     Group A Streptococcus PCR Throat Swab  -     lidocaine, viscous, (XYLOCAINE) 2 % solution; Swish and spit 15 mLs in mouth every 3 hours as needed for moderate pain ; Max 8 doses/24 hour period.    Exposure to 2019 novel coronavirus  -     Symptomatic; Unknown COVID-19 Virus (Coronavirus) by PCR Nose        Anamika Mcdonnell PA-C

## 2022-07-19 ENCOUNTER — OFFICE VISIT (OUTPATIENT)
Dept: FAMILY MEDICINE | Facility: CLINIC | Age: 52
End: 2022-07-19
Payer: COMMERCIAL

## 2022-07-19 VITALS
HEART RATE: 85 BPM | TEMPERATURE: 97.2 F | OXYGEN SATURATION: 99 % | HEIGHT: 62 IN | SYSTOLIC BLOOD PRESSURE: 128 MMHG | WEIGHT: 226 LBS | DIASTOLIC BLOOD PRESSURE: 68 MMHG | BODY MASS INDEX: 41.59 KG/M2 | RESPIRATION RATE: 15 BRPM

## 2022-07-19 DIAGNOSIS — E66.01 MORBID OBESITY (H): ICD-10-CM

## 2022-07-19 DIAGNOSIS — E78.5 HYPERLIPIDEMIA LDL GOAL <100: ICD-10-CM

## 2022-07-19 DIAGNOSIS — Z00.00 ROUTINE GENERAL MEDICAL EXAMINATION AT A HEALTH CARE FACILITY: Primary | ICD-10-CM

## 2022-07-19 DIAGNOSIS — Z13.220 SCREENING FOR HYPERLIPIDEMIA: ICD-10-CM

## 2022-07-19 DIAGNOSIS — Z23 ENCOUNTER FOR ADMINISTRATION OF VACCINE: ICD-10-CM

## 2022-07-19 DIAGNOSIS — J45.20 MILD INTERMITTENT ASTHMA, UNSPECIFIED WHETHER COMPLICATED: ICD-10-CM

## 2022-07-19 DIAGNOSIS — R73.03 PRE-DIABETES: ICD-10-CM

## 2022-07-19 DIAGNOSIS — Z12.31 ENCOUNTER FOR SCREENING MAMMOGRAM FOR BREAST CANCER: ICD-10-CM

## 2022-07-19 DIAGNOSIS — Z23 HIGH PRIORITY FOR 2019-NCOV VACCINE: ICD-10-CM

## 2022-07-19 LAB
ALBUMIN SERPL-MCNC: 3.7 G/DL (ref 3.4–5)
ALP SERPL-CCNC: 98 U/L (ref 40–150)
ALT SERPL W P-5'-P-CCNC: 18 U/L (ref 0–50)
ANION GAP SERPL CALCULATED.3IONS-SCNC: 7 MMOL/L (ref 3–14)
AST SERPL W P-5'-P-CCNC: 13 U/L (ref 0–45)
BILIRUB SERPL-MCNC: 0.2 MG/DL (ref 0.2–1.3)
BUN SERPL-MCNC: 11 MG/DL (ref 7–30)
CALCIUM SERPL-MCNC: 9.2 MG/DL (ref 8.5–10.1)
CHLORIDE BLD-SCNC: 113 MMOL/L (ref 94–109)
CHOLEST SERPL-MCNC: 165 MG/DL
CO2 SERPL-SCNC: 23 MMOL/L (ref 20–32)
CREAT SERPL-MCNC: 0.86 MG/DL (ref 0.52–1.04)
FASTING STATUS PATIENT QL REPORTED: NO
GFR SERPL CREATININE-BSD FRML MDRD: 82 ML/MIN/1.73M2
GLUCOSE BLD-MCNC: 95 MG/DL (ref 70–99)
HBA1C MFR BLD: 6.4 % (ref 0–5.6)
HDLC SERPL-MCNC: 28 MG/DL
LDLC SERPL CALC-MCNC: 92 MG/DL
NONHDLC SERPL-MCNC: 137 MG/DL
POTASSIUM BLD-SCNC: 3.7 MMOL/L (ref 3.4–5.3)
PROT SERPL-MCNC: 7.3 G/DL (ref 6.8–8.8)
SODIUM SERPL-SCNC: 143 MMOL/L (ref 133–144)
TRIGL SERPL-MCNC: 227 MG/DL

## 2022-07-19 PROCEDURE — 99214 OFFICE O/P EST MOD 30 MIN: CPT | Mod: 25 | Performed by: FAMILY MEDICINE

## 2022-07-19 PROCEDURE — 80053 COMPREHEN METABOLIC PANEL: CPT | Performed by: FAMILY MEDICINE

## 2022-07-19 PROCEDURE — 99396 PREV VISIT EST AGE 40-64: CPT | Mod: 25 | Performed by: FAMILY MEDICINE

## 2022-07-19 PROCEDURE — 91305 COVID-19,PF,PFIZER (12+ YRS): CPT | Performed by: FAMILY MEDICINE

## 2022-07-19 PROCEDURE — 83036 HEMOGLOBIN GLYCOSYLATED A1C: CPT | Performed by: FAMILY MEDICINE

## 2022-07-19 PROCEDURE — 90471 IMMUNIZATION ADMIN: CPT | Performed by: FAMILY MEDICINE

## 2022-07-19 PROCEDURE — 80061 LIPID PANEL: CPT | Performed by: FAMILY MEDICINE

## 2022-07-19 PROCEDURE — 36415 COLL VENOUS BLD VENIPUNCTURE: CPT | Performed by: FAMILY MEDICINE

## 2022-07-19 PROCEDURE — 0054A COVID-19,PF,PFIZER (12+ YRS): CPT | Performed by: FAMILY MEDICINE

## 2022-07-19 PROCEDURE — 90677 PCV20 VACCINE IM: CPT | Performed by: FAMILY MEDICINE

## 2022-07-19 RX ORDER — SIMVASTATIN 20 MG
10 TABLET ORAL AT BEDTIME
Qty: 90 TABLET | Refills: 0 | Status: SHIPPED | OUTPATIENT
Start: 2022-07-19 | End: 2023-07-11

## 2022-07-19 ASSESSMENT — ENCOUNTER SYMPTOMS
FREQUENCY: 1
CONSTIPATION: 0
HEARTBURN: 1
EYE PAIN: 0
NERVOUS/ANXIOUS: 1
BREAST MASS: 0
SORE THROAT: 1
DIARRHEA: 1
ABDOMINAL PAIN: 1
COUGH: 1
NAUSEA: 0
HEMATURIA: 0
CHILLS: 0
ARTHRALGIAS: 1
DIZZINESS: 1
HEMATOCHEZIA: 0
HEADACHES: 0
DYSURIA: 0
FEVER: 0
PARESTHESIAS: 0
MYALGIAS: 1
PALPITATIONS: 0
SHORTNESS OF BREATH: 1
JOINT SWELLING: 1
WEAKNESS: 0

## 2022-07-19 ASSESSMENT — PATIENT HEALTH QUESTIONNAIRE - PHQ9
SUM OF ALL RESPONSES TO PHQ QUESTIONS 1-9: 9
10. IF YOU CHECKED OFF ANY PROBLEMS, HOW DIFFICULT HAVE THESE PROBLEMS MADE IT FOR YOU TO DO YOUR WORK, TAKE CARE OF THINGS AT HOME, OR GET ALONG WITH OTHER PEOPLE: SOMEWHAT DIFFICULT
SUM OF ALL RESPONSES TO PHQ QUESTIONS 1-9: 9

## 2022-07-19 ASSESSMENT — PAIN SCALES - GENERAL: PAINLEVEL: MILD PAIN (2)

## 2022-07-19 ASSESSMENT — ASTHMA QUESTIONNAIRES
QUESTION_2 LAST FOUR WEEKS HOW OFTEN HAVE YOU HAD SHORTNESS OF BREATH: ONCE A DAY
QUESTION_4 LAST FOUR WEEKS HOW OFTEN HAVE YOU USED YOUR RESCUE INHALER OR NEBULIZER MEDICATION (SUCH AS ALBUTEROL): ONCE A WEEK OR LESS
QUESTION_1 LAST FOUR WEEKS HOW MUCH OF THE TIME DID YOUR ASTHMA KEEP YOU FROM GETTING AS MUCH DONE AT WORK, SCHOOL OR AT HOME: SOME OF THE TIME
QUESTION_3 LAST FOUR WEEKS HOW OFTEN DID YOUR ASTHMA SYMPTOMS (WHEEZING, COUGHING, SHORTNESS OF BREATH, CHEST TIGHTNESS OR PAIN) WAKE YOU UP AT NIGHT OR EARLIER THAN USUAL IN THE MORNING: ONCE A WEEK
EMERGENCY_ROOM_LAST_YEAR_TOTAL: TWO
QUESTION_5 LAST FOUR WEEKS HOW WOULD YOU RATE YOUR ASTHMA CONTROL: SOMEWHAT CONTROLLED
ACT_TOTALSCORE: 15
ACT_TOTALSCORE: 15

## 2022-07-19 NOTE — PROGRESS NOTES
SUBJECTIVE:   CC: Jessie Seymour is an 51 year old woman who presents for preventive health visit.       Patient has been advised of split billing requirements and indicates understanding: Yes  Healthy Habits:     Getting at least 3 servings of Calcium per day:  NO    Bi-annual eye exam:  Yes    Dental care twice a year:  NO    Sleep apnea or symptoms of sleep apnea:  Excessive snoring and Sleep apnea    Diet:  Low fat/cholesterol and Breakfast skipped    Frequency of exercise:  1 day/week    Duration of exercise:  15-30 minutes    Taking medications regularly:  No    Barriers to taking medications:  Problems remembering to take them    Medication side effects:  Lightheadedness and Other    PHQ-2 Total Score: 2    Additional concerns today:  Yes    Patient with a medical history of Alpha-thalassemia, tobacco depence, PTSD, Mild depression, Hyperlipidemia, AGUSTIN    Waiting CPAP machine, still seeing [facility], sleep therapist later next month    Hypertension Follow-up      Do you check your blood pressure regularly outside of the clinic? No     Are you following a low salt diet? Yes    Are your blood pressures ever more than 140 on the top number (systolic) OR more   than 90 on the bottom number (diastolic), for example 140/90? No    Depression and Anxiety Follow-Up    How are you doing with your depression since your last visit? No change    How are you doing with your anxiety since your last visit?  No change    Are you having other symptoms that might be associated with depression or anxiety? No    Have you had a significant life event? No 4 year Anniversary of her son's father dying by suicide.    Do you have any concerns with your use of alcohol or other drugs? No    Social History     Tobacco Use     Smoking status: Current Every Day Smoker     Packs/day: 0.50     Years: 15.00     Pack years: 7.50     Types: Cigarettes     Smokeless tobacco: Never Used     Tobacco comment: Pt. smokes appx. 6 cigarettes daily    Vaping Use     Vaping Use: Never used   Substance Use Topics     Alcohol use: Yes     Alcohol/week: 0.0 standard drinks     Comment: Rarely     Drug use: No     PHQ 2/11/2022 4/29/2022 7/19/2022   PHQ-9 Total Score 11 11 9   Q9: Thoughts of better off dead/self-harm past 2 weeks Not at all Not at all Not at all     BON-7 SCORE 7/7/2021 2/11/2022 4/29/2022   Total Score - - -   Total Score 13 (moderate anxiety) 14 (moderate anxiety) 11 (moderate anxiety)   Total Score 13 14 11       Asthma Follow-Up    Was ACT completed today?    Yes    ACT Total Scores 7/19/2022   ACT TOTAL SCORE -   ASTHMA ER VISITS -   ASTHMA HOSPITALIZATIONS -   ACT TOTAL SCORE (Goal Greater than or Equal to 20) 15   In the past 12 months, how many times did you visit the emergency room for your asthma without being admitted to the hospital? 2   In the past 12 months, how many times were you hospitalized overnight because of your asthma? 0         How many days per week do you miss taking your asthma controller medication?  I do not have an asthma controller medication    Please describe any recent triggers for your asthma: upper respiratory infections, dust mites, pollens and insects/rodents    Have you had any Emergency Room Visits, Urgent Care Visits, or Hospital Admissions since your last office visit?  Yes  Number of ER or Urgent Care visits for asthma: Per [facility] care.    Migraine     Since your last clinic visit, how have your headaches changed?  No change    How often are you getting headaches or migraines? Rarely     Are you able to do normal daily activities when you have a migraine? Yes    Are you taking rescue/relief medications? (Select all that apply) No    How helpful is your rescue/relief medication?  I get total relief    Are you taking any medications to prevent migraines? (Select all that apply)  No    In the past 4 weeks, how often have you gone to urgent care or the emergency room because of your headaches?  0      Today's  PHQ-2 Score:   PHQ-2 ( 1999 Pfizer) 7/19/2022   Q1: Little interest or pleasure in doing things 1   Q2: Feeling down, depressed or hopeless 1   PHQ-2 Score 2   PHQ-2 Total Score (12-17 Years)- Positive if 3 or more points; Administer PHQ-A if positive -   Q1: Little interest or pleasure in doing things Several days   Q2: Feeling down, depressed or hopeless Several days   PHQ-2 Score 2       Abuse: Current or Past (Physical, Sexual or Emotional) - No  Do you feel safe in your environment? Yes        Social History     Tobacco Use     Smoking status: Current Every Day Smoker     Packs/day: 0.50     Years: 15.00     Pack years: 7.50     Types: Cigarettes     Smokeless tobacco: Never Used     Tobacco comment: Pt. smokes appx. 6 cigarettes daily   Substance Use Topics     Alcohol use: Yes     Alcohol/week: 0.0 standard drinks     Comment: Rarely         Alcohol Use 7/19/2022   Prescreen: >3 drinks/day or >7 drinks/week? No   Prescreen: >3 drinks/day or >7 drinks/week? -       Reviewed orders with patient.  Reviewed health maintenance and updated orders accordingly - Yes  BP Readings from Last 3 Encounters:   07/19/22 128/68   06/13/22 107/77   03/10/22 117/54    Wt Readings from Last 3 Encounters:   07/19/22 102.5 kg (226 lb)   06/13/22 103.2 kg (227 lb 9.6 oz)   06/09/22 98.9 kg (218 lb)                  Patient Active Problem List   Diagnosis     Chronic low back pain     MEDICAL HISTORY OF - VERY DIFFICULT LAB DRAW and IV START     Allergic rhinitis     Tobacco use disorder     Family history of breast cancer     Intermittent asthma     Migraine headache     Melasma     Hiatal hernia     Alpha-thalassemia (H)     Aortic regurgitation     Mild major depression (H)     Morbid obesity (H)     Hyperlipidemia LDL goal <100     Pre-diabetes     Anxiety     PTSD (post-traumatic stress disorder)     Chronic insomnia     Restless legs syndrome (RLS)     AGUSTIN (obstructive sleep apnea)- mild (AHI 9)     Past Surgical History:    Procedure Laterality Date     BIOPSY BREAST SEED LOCALIZATION Left 01/21/2016    Procedure: BIOPSY BREAST SEED LOCALIZATION;  Surgeon: Perry Desai MD;  Location: [facility] OR     COLONOSCOPY  03/10/2022     COLONOSCOPY N/A 3/10/2022    Procedure: COLONOSCOPY, FLEXIBLE, WITH LESION REMOVAL USING SNARE;  Surgeon: Pb Islas MD;  Location: [facility] OR     COLONOSCOPY WITH CO2 INSUFFLATION N/A 3/10/2022    Procedure: COLONOSCOPY, WITH CO2 INSUFFLATION;  Surgeon: Pb Islas MD;  Location: [facility] OR     DILATION AND CURETTAGE, HYSTEROSCOPY DIAGNOSTIC, COMBINED N/A 05/24/2016    Procedure: COMBINED DILATION AND CURETTAGE, HYSTEROSCOPY DIAGNOSTIC;  Surgeon: Adis Cummings MD;  Location: [facility] OR     ESOPHAGOSCOPY, GASTROSCOPY, DUODENOSCOPY (EGD), COMBINED  07/08/2014    Procedure: COMBINED ESOPHAGOSCOPY, GASTROSCOPY, DUODENOSCOPY (EGD), BIOPSY SINGLE OR MULTIPLE;  Surgeon: Rodolfo Carlin MD;  Location:  GI     EYE SURGERY  1975    trauma     HERNIA REPAIR  1972    infant     HYSTERECTOMY TOTAL ABDOMINAL N/A 06/28/2016    Procedure: HYSTERECTOMY TOTAL ABDOMINAL;  Surgeon: Adis Cummings MD;  Location: [facility] OR     LAPAROSCOPIC CHOLECYSTECTOMY  01/01/1991    Cholecystectomy, Laparoscopic     LAPAROSCOPY DIAGNOSTIC (GYN) N/A 06/28/2016    Procedure: LAPAROSCOPY DIAGNOSTIC (GYN);  Surgeon: Adis Cummings MD;  Location: [facility] OR     LAPAROTOMY, LYSIS ADHESIONS, COMBINED N/A 06/28/2016    Procedure: COMBINED LAPAROTOMY, LYSIS ADHESIONS;  Surgeon: Adis Cummings MD;  Location: [facility] OR     MARSUPIALIZATION BARTHOLIN CYST Left 10/13/2020    Procedure: MARSUPIALIZATION OF LEFT BARTHOLIN'S CYST;  Surgeon: Chris Freeman MD;  Location: [facility] OR     PELVIS LAPAROSCOPY,DX  01/01/2001    Laparoscopy for endometriosis     SURGICAL HISTORY OF -   1971    left ovarian surgery for herniation, benign 1971       Social History     Tobacco Use     Smoking status: Current Every Day Smoker     Packs/day: 0.50      Years: 15.00     Pack years: 7.50     Types: Cigarettes     Smokeless tobacco: Never Used     Tobacco comment: Pt. smokes appx. 6 cigarettes daily   Substance Use Topics     Alcohol use: Yes     Alcohol/week: 0.0 standard drinks     Comment: Rarely     Family History   Problem Relation Age of Onset     Cardiovascular Mother         long QT syndrom     Heart Disease Mother         entire family has heart problems of some type     Hypertension Mother      Depression Mother      Breast Cancer Mother      Thyroid Disease Mother      Osteoporosis Mother      Thyroid Disease Mother      Dementia Mother      Heart Disease Father         not sure about details, had stroke     Hypertension Father      Heart Disease Brother         CP Hypertension-7 brothers-5 alive right now      Hypertension Brother      Heart Disease Brother         CP Hypertension     Genetic Disorder Brother      Heart Disease Sister         stroke      Diabetes Sister      Heart Disease Brother         long QT syndrome- at age of 16     Heart Disease Maternal Grandmother      Hypertension Maternal Grandmother      Heart Disease Maternal Grandfather      Hypertension Maternal Grandfather      Diabetes Sister         6 sisters     Musculoskeletal Disorder Sister      Genetic Disorder Sister         lupus     Hypertension Sister      Diabetes Maternal Uncle      Hypertension Sister      Cerebrovascular Disease Sister      Hypertension Brother      Hypertension Paternal Grandmother      Hypertension Paternal Grandfather      Hypertension Sister      Asthma Sister      Allergies Other         most everyone in family has some type of allergy     Asthma Other      Cancer Other 40        one maternal cousin with bca in 50s, one paternal cousin with bca in 40s     Cerebrovascular Disease Sister      Osteoporosis Sister      Gynecology Sister         3 sisters with PCOS     Blood Disease Sister         trade for sickle cell anemia     Diabetes Sister       Hypertension Sister      Cerebrovascular Disease Sister      Depression Sister      Asthma Sister      Osteoporosis Sister      Genetic Disorder Sister      Diabetes Other      Hypertension Cousin      Other Cancer Cousin      Breast Cancer Cousin      Other Cancer Other      Asthma Nephew      Thyroid Disease Other      Cancer - colorectal No family hx of      Long QT syndrome Mother      Acute Myocardial Infarction Mother      Cerebrovascular Disease Father      Acute Myocardial Infarction Sister      Cerebrovascular Disease Sister      Hypertension Sister      Long QT syndrome Brother      Acute Myocardial Infarction Sister      Cerebrovascular Disease Sister      Hypertension Sister      Pulmonary Hypertension Brother          Current Outpatient Medications   Medication Sig Dispense Refill     acetaminophen (TYLENOL) 325 MG tablet Take 325-650 mg by mouth every 6 hours as needed        albuterol (PROAIR HFA) 108 (90 Base) MCG/ACT inhaler Inhale 2 puffs into the lungs every 4 hours as needed for shortness of breath / dyspnea or wheezing 18 g 3     albuterol (PROVENTIL) (2.5 MG/3ML) 0.083% neb solution Take 1 vial (2.5 mg) by nebulization every 6 hours as needed for shortness of breath / dyspnea 3 mL 3     aspirin 81 MG tablet Take 81 mg by mouth daily        camphor-menthol (SARNA) 0.5-0.5 % external lotion Apply 1 mL topically every 6 hours as needed for skin care Apply to palms of hands 2-3 times daily 59 mL 1     clonazePAM (KLONOPIN) 0.5 MG tablet Take 0.5-1 tablets (0.25-0.5 mg) by mouth nightly as needed for anxiety or sleep 45 tablet 2     diphenhydrAMINE (BENADRYL) 25 MG tablet Take 1-2 tablets (25-50 mg) by mouth every 6 hours as needed for itching or allergies 60 tablet 1     hydrocortisone valerate (WEST-LESLY) 0.2 % external ointment Apply sparingly to affected area three times daily as needed. 60 g 0     ibuprofen (ADVIL/MOTRIN) 600 MG tablet Take 1 tablet (600 mg) by mouth every 6 hours as needed for  pain Take w/ food 30 tablet 0     ipratropium - albuterol 0.5 mg/2.5 mg/3 mL (DUONEB) 0.5-2.5 (3) MG/3ML neb solution Take 1 vial (3 mLs) by nebulization every 6 hours as needed for shortness of breath / dyspnea or wheezing 3 mL 1     ketoconazole (NIZORAL) 2 % external cream Apply thin layer to soles of feet twice daily for 2 weeks. 30 g 1     ketorolac (TORADOL) 10 MG tablet TAKE ONE TABLET BY MOUTH EVERY 6 HOURS AS NEEDED FOR PAIN 4 tablet 0     lidocaine, viscous, (XYLOCAINE) 2 % solution Swish and spit 15 mLs in mouth every 3 hours as needed for moderate pain ; Max 8 doses/24 hour period. 100 mL 0     melatonin 3 MG tablet Take 3 mg by mouth nightly as needed        metFORMIN (GLUCOPHAGE XR) 500 MG 24 hr tablet Take 1 tablet (500 mg) by mouth daily (with dinner) 90 tablet 1     metroNIDAZOLE (METROCREAM) 0.75 % external cream Apply thin layer to face twice daily. 45 g 11     mometasone (ELOCON) 0.1 % external cream Apply thin layer twice daily as needed for itchy areas on the hands and feet. 50 g 11     mometasone-formoterol (DULERA) 200-5 MCG/ACT inhaler Inhale 2 puffs into the lungs 2 times daily for 90 days 13 g 3     nortriptyline (PAMELOR) 10 MG capsule Take 1 capsule (10 mg) by mouth At Bedtime Every 3 days 30 capsule 1     pramipexole (MIRAPEX) 0.125 MG tablet Take 1-2 tablets (0.125-0.25 mg) by mouth At Bedtime Take hour before bedtime 180 tablet 3     simvastatin (ZOCOR) 20 MG tablet Take 0.5 tablets (10 mg) by mouth At Bedtime 90 tablet 0     Allergies   Allergen Reactions     Loratadine Other (See Comments)     Dries her up and gets rare sinus infection     Morphine Itching     PN: LW Reaction: Itching, Pruritis     Oxycodone Nausea and Vomiting and Itching     Other reaction(s): Diaphoresis  Other reaction(s): Diaphoresis     Sumatriptan      Worsens migraine     Zofran [Ondansetron]      CARDIO recommended never to take this medication      Codeine Anxiety     Becomes tearful      Hydrocodone-Acetaminophen Anxiety     Recent Labs   Lab Test 07/19/22  1445 10/21/21  0625 10/20/21  2237 10/20/21  0902 07/07/21  1010 04/02/21  1331 04/07/20  0000 02/18/20  0907 05/23/16  1642 12/26/15  1007   A1C 6.4*  --   --   --  6.1*  --   --   --   --   --    LDL 92  --   --   --  110*  --   --  129*   < > 94   HDL 28*  --   --   --  29*  --   --  30*   < > 27*   TRIG 227*  --   --   --  225*  --   --  162*   < > 166*   ALT 18 20 24   < > 20 27   < > 20   < > 12   CR 0.86 0.90 0.92   < > 0.78 0.81   < > 0.80   < > 0.85   GFRESTIMATED 82 75 73   < > 89 85   < > 87   < > 72   GFRESTBLACK  --   --   --   --  >90 >90   < > >90   < > 87   POTASSIUM 3.7 4.1 3.9   < > 4.3 3.7   < > 4.0   < > 4.2   TSH  --   --   --   --   --   --   --  1.10  --  1.11    < > = values in this interval not displayed.        Breast Cancer Screening:    FHS-7:   Breast CA Risk Assessment (FHS-7) 7/7/2021 7/19/2022   Did any of your first-degree relatives have breast or ovarian cancer? Yes Yes   Did any of your relatives have bilateral breast cancer? No -   Did any man in your family have breast cancer? No -   Did any woman in your family have breast and ovarian cancer? Yes -   Did any woman in your family have breast cancer before age 50 y? Yes -   Do you have 2 or more relatives with breast and/or ovarian cancer? Yes -   Do you have 2 or more relatives with breast and/or bowel cancer? Yes -     click delete button to remove this line now  Pertinent mammograms are reviewed under the imaging tab.    History of abnormal Pap smear: NO - age 30- 65 PAP every 3 years recommended  NO - age 30-65 PAP every 5 years with negative HPV co-testing recommended  PAP / HPV Latest Ref Rng & Units 4/11/2016 8/8/2013 1/30/2012   PAP (Historical) - NIL NIL NIL   HPV16 NEG Negative - -   HPV18 NEG Negative - -   HRHPV NEG Negative - -     Reviewed and updated as needed this visit by clinical staff   Tobacco  Allergies  Meds  Problems  Med Hx  Surg Hx  " [facility] Hx            Reviewed and updated as needed this visit by Provider     Meds  Problems  Med Hx  Surg Hx  [facility] Hx               Review of Systems   Constitutional: Negative for chills and fever.   HENT: Positive for ear pain and sore throat. Negative for congestion and hearing loss.    Eyes: Positive for visual disturbance. Negative for pain.   Respiratory: Positive for cough and shortness of breath.    Cardiovascular: Positive for chest pain and peripheral edema. Negative for palpitations.   Gastrointestinal: Positive for abdominal pain, diarrhea and heartburn. Negative for constipation, hematochezia and nausea.   Breasts:  Positive for tenderness. Negative for breast mass and discharge.   Genitourinary: Positive for frequency. Negative for dysuria, genital sores, hematuria, pelvic pain, urgency, vaginal bleeding and vaginal discharge.   Musculoskeletal: Positive for arthralgias, joint swelling and myalgias.   Skin: Negative for rash.   Neurological: Positive for dizziness. Negative for weakness, headaches and paresthesias.   Psychiatric/Behavioral: Positive for mood changes. The patient is nervous/anxious.         OBJECTIVE:   /68   Pulse 85   Temp 97.2  F (36.2  C) (Temporal)   Resp 15   Ht 1.565 m (5' 1.61\")   Wt 102.5 kg (226 lb)   LMP 06/22/2016 (Exact Date)   SpO2 99%   BMI 41.86 kg/m    Physical Exam  GENERAL: healthy, alert and no distress  EYES: Eyes grossly normal to inspection, PERRL and conjunctivae and sclerae normal  HENT: ear canals and TM's normal, nose and mouth without ulcers or lesions  NECK: no adenopathy, no asymmetry, masses, or scars and thyroid normal to palpation  RESP: lungs clear to auscultation - no rales, rhonchi or wheezes  BREAST: normal without masses, tenderness or nipple discharge and no palpable axillary masses or adenopathy  CV: regular rate and rhythm, normal S1 S2, no S3 or S4, no murmur, click or rub, no peripheral edema and peripheral pulses " strong  ABDOMEN: soft, nontender, no hepatosplenomegaly, no masses and bowel sounds normal   (female): normal female external genitalia, normal urethral meatus, vaginal mucosa pink, moist, well rugated, and normal cervix/adnexa/uterus without masses or discharge  MS: no gross musculoskeletal defects noted, no edema  SKIN: no suspicious lesions or rashes  NEURO: Normal strength and tone, mentation intact and speech normal  PSYCH: mentation appears normal, affect normal/bright  Results for orders placed or performed in visit on 07/19/22   Lipid panel reflex to direct LDL Fasting     Status: Abnormal   Result Value Ref Range    Cholesterol 165 <200 mg/dL    Triglycerides 227 (H) <150 mg/dL    Direct Measure HDL 28 (L) >=50 mg/dL    LDL Cholesterol Calculated 92 <=100 mg/dL    Non HDL Cholesterol 137 (H) <130 mg/dL    Patient Fasting > 8hrs? No     Narrative    Cholesterol  Desirable:  <200 mg/dL    Triglycerides  Normal:  Less than 150 mg/dL  Borderline High:  150-199 mg/dL  High:  200-499 mg/dL  Very High:  Greater than or equal to 500 mg/dL    Direct Measure HDL  Female:  Greater than or equal to 50 mg/dL   Male:  Greater than or equal to 40 mg/dL    LDL Cholesterol  Desirable:  <100mg/dL  Above Desirable:  100-129 mg/dL   Borderline High:  130-159 mg/dL   High:  160-189 mg/dL   Very High:  >= 190 mg/dL    Non HDL Cholesterol  Desirable:  130 mg/dL  Above Desirable:  130-159 mg/dL  Borderline High:  160-189 mg/dL  High:  190-219 mg/dL  Very High:  Greater than or equal to 220 mg/dL   Hemoglobin A1c     Status: Abnormal   Result Value Ref Range    Hemoglobin A1C 6.4 (H) 0.0 - 5.6 %         ASSESSMENT/PLAN:   (Z00.00) Routine general medical examination at a health care facility  (primary encounter diagnosis)  Comment: See Counseling  Plan: REVIEW OF HEALTH MAINTENANCE PROTOCOL ORDERS      (Z13.220) Screening for hyperlipidemia  Comment: See order       (E66.01) Morbid obesity (H)  Comment: Weight management plan:  "Discussed healthy diet and exercise guidelines    Plan: Comprehensive Weight Management            (E78.5) Hyperlipidemia LDL goal <100  Comment: Stable, continue present management  Plan: Lipid panel reflex to direct LDL Fasting,         Comprehensive metabolic panel, simvastatin         (ZOCOR) 20 MG tablet      (R73.03) Pre-diabetes  Plan: Hemoglobin A1c, **A1C FUTURE 6mo, metFORMIN         (GLUCOPHAGE XR) 500 MG 24 hr tablet    Diabetes Screening:  Your results are in the \"prediabetes\" range.   You do not have diabetes  There are steps you can take to delay the onset of diabetes and reduce your risk including:   Eliminate pop and sweetened beverages  Reduce carbohydrates in your diet (breads, pasta, rice, crackers, chips, candy, sweets)  Get regular exercise and lead an active lifestyle  Weight management  We will continue to monitor this trend. Plan to repeat these tests in 12 months.   Please let me know if you would like a referral to a nutritionist to learn more about preventing diabetes.     Interpreting Your Diabetes Screening Labs:  Fasting blood sugar or glucose:  Normal: less than 100  Prediabetes range: 100-125  Diabetes range: >/=126    Hemoglobin a1c: a measure of blood sugar control over the past 3 months:  Normal range: 4.0-5.6%  Prediabetes range: 5.7-6.4%  Diabetes range: 6.5% and higher      (Z23) Encounter for administration of vaccine  Comment: See order   Plan: Pneumococcal 20 Valent Conjugate (Prevnar 20),         CANCELED: ZOSTER VACCINE RECOMBINANT ADJUVANTED        (SHINGRIX)         (J45.20) Mild intermittent asthma, unspecified whether complicated  Comment: Stable, continue present management  Plan: mometasone-formoterol (DULERA) 200-5 MCG/ACT         inhaler      (Z12.31) Encounter for screening mammogram for breast cancer  Comment: See order Plan:   MA Screening Digital Bilateral      (Z23) High priority for 2019-nCoV vaccine  Comment: See order   Plan: COVID-19,PF,PFIZER (12+ Yrs " "BASHIR LABEL)        Patient has been advised of split billing requirements and indicates understanding: Yes    COUNSELING:  Reviewed preventive health counseling, as reflected in patient instructions       Regular exercise       Healthy diet/nutrition       Vision screening       Hearing screening       Osteoporosis prevention/bone health       Colorectal Cancer Screening       Advance Care Planning    Estimated body mass index is 41.86 kg/m  as calculated from the following:    Height as of this encounter: 1.565 m (5' 1.61\").    Weight as of this encounter: 102.5 kg (226 lb).    Weight management plan: Discussed healthy diet and exercise guidelines    She reports that she has been smoking cigarettes. She has a 7.50 pack-year smoking history. She has never used smokeless tobacco.  Tobacco Cessation Action Plan:   Information offered: Patient not interested at this time      Counseling Resources:  ATP IV Guidelines  Pooled Cohorts Equation Calculator  Breast Cancer Risk Calculator  BRCA-Related Cancer Risk Assessment: FHS-7 Tool  FRAX Risk Assessment  ICSI Preventive Guidelines  Dietary Guidelines for Americans, 2010  [employer]'s MyPlate  ASA Prophylaxis  Lung CA Screening    Roshan Pelayo MD  [facility]  Answers for HPI/ROS submitted by the patient on 7/19/2022  If you checked off any problems, how difficult have these problems made it for you to do your work, take care of things at home, or get along with other people?: Somewhat difficult  PHQ9 TOTAL SCORE: 9      "

## 2022-07-19 NOTE — NURSING NOTE
Prior to immunization administration, verified patients identity using patient s name and date of birth. Please see Immunization Activity for additional information.     Screening Questionnaire for Adult Immunization    Are you sick today?   No   Do you have allergies to medications, food, a vaccine component or latex?   Yes   Have you ever had a serious reaction after receiving a vaccination?   No   Do you have a long-term health problem with heart, lung, kidney, or metabolic disease (e.g., diabetes), asthma, a blood disorder, no spleen, complement component deficiency, a cochlear implant, or a spinal fluid leak?  Are you on long-term aspirin therapy?   Yes   Do you have cancer, leukemia, HIV/AIDS, or any other immune system problem?   No   Do you have a parent, brother, or sister with an immune system problem?   No   In the past 3 months, have you taken medications that affect  your immune system, such as prednisone, other steroids, or anticancer drugs; drugs for the treatment of rheumatoid arthritis, Crohn s disease, or psoriasis; or have you had radiation treatments?   No   Have you had a seizure, or a brain or other nervous system problem?   No   During the past year, have you received a transfusion of blood or blood    products, or been given immune (gamma) globulin or antiviral drug?   No   For women: Are you pregnant or is there a chance you could become       pregnant during the next month?   No   Have you received any vaccinations in the past 4 weeks?   No     Immunization questionnaire was positive for at least one answer.  Notified provider.        Per orders of Dr. Pelayo, injection of [id number] and pfizer covid given by Kimberly Neil CMA. Patient instructed to remain in clinic for 15 minutes afterwards, and to report any adverse reaction to me immediately.       Screening performed by Kimberly Neil CMA on 7/19/2022 at 2:25 PM.

## 2022-07-19 NOTE — LETTER
My Asthma Action Plan    Name: Jessie Seymour   YOB: 1970  Date: 7/19/2022   My doctor: Roshan Pelayo MD   My clinic: [facility]        My Rescue Medicine:   Albuterol inhaler (Proair/Ventolin/Proventil HFA)  2-4 puffs EVERY 4 HOURS as needed. Use a spacer if recommended by your provider.   My Asthma Severity:   Intermittent / Exercise Induced  Know your asthma triggers: upper respiratory infections, dust mites, pollens, humidity, strong odors and fumes, occupational exposure and exercise or sports  smoke          GREEN ZONE   Good Control    I feel good    No cough or wheeze    Can work, sleep and play without asthma symptoms       Take your asthma control medicine every day.     1. If exercise triggers your asthma, take your rescue medication    15 minutes before exercise or sports, and    During exercise if you have asthma symptoms  2. Spacer to use with inhaler: If you have a spacer, make sure to use it with your inhaler             YELLOW ZONE Getting Worse  I have ANY of these:    I do not feel good    Cough or wheeze    Chest feels tight    Wake up at night   1. Keep taking your Green Zone medications  2. Start taking your rescue medicine:    every 20 minutes for up to 1 hour. Then every 4 hours for 24-48 hours.  3. If you stay in the Yellow Zone for more than 12-24 hours, contact your doctor.  4. If you do not return to the Green Zone in 12-24 hours or you get worse, start taking your oral steroid medicine if prescribed by your provider.           RED ZONE Medical Alert - Get Help  I have ANY of these:    I feel awful    Medicine is not helping    Breathing getting harder    Trouble walking or talking    Nose opens wide to breathe       1. Take your rescue medicine NOW  2. If your provider has prescribed an oral steroid medicine, start taking it NOW  3. Call your doctor NOW  4. If you are still in the Red Zone after 20 minutes and you have not reached your  doctor:    Take your rescue medicine again and    Call 911 or go to the emergency room right away    See your regular doctor within 2 weeks of an Emergency Room or Urgent Care visit for follow-up treatment.          Annual Reminders:  Meet with Asthma Educator,  Flu Shot in the Fall, consider Pneumonia Vaccination for patients with asthma (aged 19 and older).    Pharmacy: [id number] [address]    Electronically signed by Roshan Pelayo MD   Date: 07/19/22                    Asthma Triggers  How To Control Things That Make Your Asthma Worse    Triggers are things that make your asthma worse.  Look at the list below to help you find your triggers and   what you can do about them. You can help prevent asthma flare-ups by staying away from your triggers.      Trigger                                                          What you can do   Cigarette Smoke  Tobacco smoke can make asthma worse. Do not allow smoking in your home, car or around you.  Be sure no one smokes at a child s day care or school.  If you smoke, ask your health care provider for ways to help you quit.  Ask family members to quit too.  Ask your health care provider for a referral to Quit Plan to help you quit smoking, or call 3-889-914-PLAN.     Colds, Flu, Bronchitis  These are common triggers of asthma. Wash your hands often.  Don t touch your eyes, nose or mouth.  Get a flu shot every year.     Dust Mites  These are tiny bugs that live in cloth or carpet. They are too small to see. Wash sheets and blankets in hot water every week.   Encase pillows and mattress in dust mite proof covers.  Avoid having carpet if you can. If you have carpet, vacuum weekly.   Use a dust mask and HEPA vacuum.   Pollen and Outdoor Mold  Some people are allergic to trees, grass, or weed pollen, or molds. Try to keep your windows closed.  Limit time out doors when pollen count is high.   Ask you health care provider about  taking medicine during allergy season.     Animal Dander  Some people are allergic to skin flakes, urine or saliva from pets with fur or feathers. Keep pets with fur or feathers out of your home.    If you can t keep the pet outdoors, then keep the pet out of your bedroom.  Keep the bedroom door closed.  Keep pets off cloth furniture and away from stuffed toys.     Mice, Rats, and Cockroaches  Some people are allergic to the waste from these pests.   Cover food and garbage.  Clean up spills and food crumbs.  Store grease in the refrigerator.   Keep food out of the bedroom.   Indoor Mold  This can be a trigger if your home has high moisture. Fix leaking faucets, pipes, or other sources of water.   Clean moldy surfaces.  Dehumidify basement if it is damp and smelly.   Smoke, Strong Odors, and Sprays  These can reduce air quality. Stay away from strong odors and sprays, such as perfume, powder, hair spray, paints, smoke incense, paint, cleaning products, candles and new carpet.   Exercise or Sports  Some people with asthma have this trigger. Be active!  Ask your doctor about taking medicine before sports or exercise to prevent symptoms.    Warm up for 5-10 minutes before and after sports or exercise.     Other Triggers of Asthma  Cold air:  Cover your nose and mouth with a scarf.  Sometimes laughing or crying can be a trigger.  Some medicines and food can trigger asthma.

## 2022-07-20 RX ORDER — METFORMIN HCL 500 MG
500 TABLET, EXTENDED RELEASE 24 HR ORAL
Qty: 90 TABLET | Refills: 1 | Status: SHIPPED | OUTPATIENT
Start: 2022-07-20 | End: 2023-07-11

## 2022-10-03 DIAGNOSIS — F51.01 PRIMARY INSOMNIA: ICD-10-CM

## 2022-10-04 RX ORDER — CLONAZEPAM 0.5 MG/1
TABLET ORAL
Qty: 45 TABLET | Refills: 2 | Status: SHIPPED | OUTPATIENT
Start: 2022-10-04 | End: 2023-07-12

## 2022-10-05 ENCOUNTER — NURSE TRIAGE (OUTPATIENT)
Dept: FAMILY MEDICINE | Facility: CLINIC | Age: 52
End: 2022-10-05

## 2022-10-05 NOTE — TELEPHONE ENCOUNTER
S: Seen in ED on Sunday  B: 51 year old female  A: Seen on Sunday and diagnosed with colitis. Was given antibiotics and told to follow up in a few days.  Pt has been taking the antibiotic but states that she is not feeling any better. Continues to have pain off and on. Extremely tired.  States that is why she hasn't set up a follow up yet-she has just been too tired.  Offered to see if patient could be seen in providers open slot but patient was not available.  States this is the first time she has gotten out of the house today.    R: Pt is wondering if the follow up can be done virtually tomorrow with .

## 2022-10-06 ENCOUNTER — OFFICE VISIT (OUTPATIENT)
Dept: PEDIATRICS | Facility: CLINIC | Age: 52
End: 2022-10-06
Attending: FAMILY MEDICINE
Payer: COMMERCIAL

## 2022-10-06 ENCOUNTER — VIRTUAL VISIT (OUTPATIENT)
Dept: FAMILY MEDICINE | Facility: CLINIC | Age: 52
End: 2022-10-06
Payer: COMMERCIAL

## 2022-10-06 VITALS
TEMPERATURE: 98.4 F | OXYGEN SATURATION: 100 % | SYSTOLIC BLOOD PRESSURE: 110 MMHG | RESPIRATION RATE: 18 BRPM | HEART RATE: 72 BPM | DIASTOLIC BLOOD PRESSURE: 58 MMHG

## 2022-10-06 DIAGNOSIS — R11.0 NAUSEA: ICD-10-CM

## 2022-10-06 DIAGNOSIS — T36.95XA ANTIBIOTIC-INDUCED YEAST INFECTION: ICD-10-CM

## 2022-10-06 DIAGNOSIS — R42 DIZZINESS: ICD-10-CM

## 2022-10-06 DIAGNOSIS — R10.84 ABDOMINAL PAIN, GENERALIZED: ICD-10-CM

## 2022-10-06 DIAGNOSIS — E86.0 DEHYDRATION: Primary | ICD-10-CM

## 2022-10-06 DIAGNOSIS — K52.9 COLITIS: Primary | ICD-10-CM

## 2022-10-06 DIAGNOSIS — K52.9 COLITIS: ICD-10-CM

## 2022-10-06 DIAGNOSIS — E86.0 DEHYDRATION: ICD-10-CM

## 2022-10-06 DIAGNOSIS — B37.9 ANTIBIOTIC-INDUCED YEAST INFECTION: ICD-10-CM

## 2022-10-06 PROCEDURE — 99207 REFERRAL TO ACUTE AND DIAGNOSTIC SERVICES: CPT | Performed by: FAMILY MEDICINE

## 2022-10-06 PROCEDURE — 96360 HYDRATION IV INFUSION INIT: CPT | Performed by: INTERNAL MEDICINE

## 2022-10-06 PROCEDURE — 99214 OFFICE O/P EST MOD 30 MIN: CPT | Mod: 25 | Performed by: INTERNAL MEDICINE

## 2022-10-06 RX ORDER — PROMETHAZINE HYDROCHLORIDE 25 MG/1
25 TABLET ORAL EVERY 8 HOURS PRN
Qty: 10 TABLET | Refills: 0 | Status: SHIPPED | OUTPATIENT
Start: 2022-10-06 | End: 2023-07-11

## 2022-10-06 RX ORDER — TRAMADOL HYDROCHLORIDE 50 MG/1
50 TABLET ORAL EVERY 6 HOURS PRN
Qty: 10 TABLET | Refills: 0 | Status: SHIPPED | OUTPATIENT
Start: 2022-10-06 | End: 2022-10-09

## 2022-10-06 RX ORDER — FLUCONAZOLE 150 MG/1
150 TABLET ORAL ONCE
Qty: 1 TABLET | Refills: 0 | Status: SHIPPED | OUTPATIENT
Start: 2022-10-06 | End: 2022-10-06

## 2022-10-06 ASSESSMENT — ASTHMA QUESTIONNAIRES
QUESTION_3 LAST FOUR WEEKS HOW OFTEN DID YOUR ASTHMA SYMPTOMS (WHEEZING, COUGHING, SHORTNESS OF BREATH, CHEST TIGHTNESS OR PAIN) WAKE YOU UP AT NIGHT OR EARLIER THAN USUAL IN THE MORNING: TWO OR THREE NIGHTS A WEEK
ACT_TOTALSCORE: 14
QUESTION_2 LAST FOUR WEEKS HOW OFTEN HAVE YOU HAD SHORTNESS OF BREATH: THREE TO SIX TIMES A WEEK
ACT_TOTALSCORE: 14
QUESTION_4 LAST FOUR WEEKS HOW OFTEN HAVE YOU USED YOUR RESCUE INHALER OR NEBULIZER MEDICATION (SUCH AS ALBUTEROL): TWO OR THREE TIMES PER WEEK
QUESTION_5 LAST FOUR WEEKS HOW WOULD YOU RATE YOUR ASTHMA CONTROL: SOMEWHAT CONTROLLED
QUESTION_1 LAST FOUR WEEKS HOW MUCH OF THE TIME DID YOUR ASTHMA KEEP YOU FROM GETTING AS MUCH DONE AT WORK, SCHOOL OR AT HOME: SOME OF THE TIME
EMERGENCY_ROOM_LAST_YEAR_TOTAL: TWO

## 2022-10-06 ASSESSMENT — ANXIETY QUESTIONNAIRES
GAD7 TOTAL SCORE: 7
2. NOT BEING ABLE TO STOP OR CONTROL WORRYING: SEVERAL DAYS
6. BECOMING EASILY ANNOYED OR IRRITABLE: SEVERAL DAYS
8. IF YOU CHECKED OFF ANY PROBLEMS, HOW DIFFICULT HAVE THESE MADE IT FOR YOU TO DO YOUR WORK, TAKE CARE OF THINGS AT HOME, OR GET ALONG WITH OTHER PEOPLE?: SOMEWHAT DIFFICULT
1. FEELING NERVOUS, ANXIOUS, OR ON EDGE: MORE THAN HALF THE DAYS
4. TROUBLE RELAXING: SEVERAL DAYS
5. BEING SO RESTLESS THAT IT IS HARD TO SIT STILL: NOT AT ALL
GAD7 TOTAL SCORE: 7
IF YOU CHECKED OFF ANY PROBLEMS ON THIS QUESTIONNAIRE, HOW DIFFICULT HAVE THESE PROBLEMS MADE IT FOR YOU TO DO YOUR WORK, TAKE CARE OF THINGS AT HOME, OR GET ALONG WITH OTHER PEOPLE: SOMEWHAT DIFFICULT
GAD7 TOTAL SCORE: 7
7. FEELING AFRAID AS IF SOMETHING AWFUL MIGHT HAPPEN: SEVERAL DAYS
3. WORRYING TOO MUCH ABOUT DIFFERENT THINGS: SEVERAL DAYS
7. FEELING AFRAID AS IF SOMETHING AWFUL MIGHT HAPPEN: SEVERAL DAYS

## 2022-10-06 ASSESSMENT — ENCOUNTER SYMPTOMS
COUGH: 0
DIZZINESS: 1
SORE THROAT: 0
MYALGIAS: 0
RHINORRHEA: 1
DIARRHEA: 1
NAUSEA: 1

## 2022-10-06 ASSESSMENT — PATIENT HEALTH QUESTIONNAIRE - PHQ9
SUM OF ALL RESPONSES TO PHQ QUESTIONS 1-9: 13
10. IF YOU CHECKED OFF ANY PROBLEMS, HOW DIFFICULT HAVE THESE PROBLEMS MADE IT FOR YOU TO DO YOUR WORK, TAKE CARE OF THINGS AT HOME, OR GET ALONG WITH OTHER PEOPLE: SOMEWHAT DIFFICULT
SUM OF ALL RESPONSES TO PHQ QUESTIONS 1-9: 13

## 2022-10-06 ASSESSMENT — PAIN SCALES - GENERAL: PAINLEVEL: MODERATE PAIN (5)

## 2022-10-06 NOTE — PROGRESS NOTES
"  Assessment & Plan   Problem List Items Addressed This Visit    None     Visit Diagnoses     Dehydration    -  Primary    Relevant Medications    lactated ringers BOLUS 1,000 mL    sodium chloride (PF) 0.9% PF flush 3 mL    Other Relevant Orders    IV access    Colitis        Relevant Medications    lactated ringers BOLUS 1,000 mL    sodium chloride (PF) 0.9% PF flush 3 mL    Other Relevant Orders    IV access    Cryptosporidium/Giardia Immunoassay    Enteric Bacteria and Virus Panel by RIT[facility] Stool    Abdominal pain, generalized        Nausea        Dizziness        Antibiotic-induced yeast infection        Relevant Medications    fluconazole (DIFLUCAN) 150 MG tablet         Declined CBC & BMP        Return in about 1 week (around 10/13/2022).    Jessica Lemons MD  [facility] [place] Louie is a 51 year old, presenting for the following health issues:  Dehydration      HPI Patient reports that she has \"gas bubbles\" since 4 days. She reports that she was seen 4 days ago in the ED, CT was done. She was DX with colitis. She is referred to the [facility] for hydration. Pt. C/O diarrhea, nausea and decrease appetite. Denies further sickness at this time.     Referral from primary provider Roshan Pelayo [facility] for 51-year-old female with history of diverticulitis with recent evaluation in the ER for nausea vomiting diarrhea with some blood noted.  diagnosis of mild colitis.  Stool test  C. difficile were negative.  She placed on  Flagyl and Ceftin.  Colonoscopy few years ago showed few polyps.  Intolerant of Zofran and Compazine.  Prescribed Phenergan.  Concerned of dehydration and requesting treatment with IV fluids.    Reviewed ER note October 3, 2022.  Unremarkable labs  Final CT report  IMPRESSION:     1.  Probable minor splenic flexure colitis rather than simply decompressed colon. This may be infectious, inflammatory, or ischemic.   2.  No other acute " abnormality within limitations of unenhanced exam. No obstructive uropathy or urolithiasis.   3.  Diverticulosis descending and sigmoid colon.     Colonoscopy report March 2022-notable for diverticulosis and polyp resection  Impression:               - The examined portion of the ileum was normal.                             - One 5 mm polyp in the ascending colon, removed                             with a cold snare. Resected and retrieved.                             - Two 3 to 5 mm polyps at the recto-sigmoid colon,                             removed with a cold snare. Resected and retrieved.                             - Diverticulosis in the sigmoid colon and in the                             descending colon.                             - The distal rectum and anal verge are normal on                             retroflexion view.         Review of Systems   Constitutional: Fever: feverish with sweats.  not new.   HENT: Positive for rhinorrhea (weather changes). Negative for sore throat.    Respiratory: Negative for cough.         Asthma - not night but occasional SOB   Cardiovascular: Negative for chest pain.   Gastrointestinal: Positive for diarrhea and nausea. Vomiting: few days ago.   Endocrine:        Menopausal sx   Genitourinary: Negative for decreased urine volume.   Musculoskeletal: Negative for myalgias.   Allergic/Immunologic: Positive for environmental allergies.   Neurological: Positive for dizziness (chronic - felt due to cholesterol meds).          Patient Active Problem List   Diagnosis     Chronic low back pain     MEDICAL HISTORY OF - VERY DIFFICULT LAB DRAW and IV START     Allergic rhinitis     Tobacco use disorder     Family history of breast cancer     Intermittent asthma     Migraine headache     Melasma     Hiatal hernia     Alpha-thalassemia (H)     Aortic regurgitation     Mild major depression (H)     Morbid obesity (H)     Hyperlipidemia LDL goal <100     Pre-diabetes     Anxiety      PTSD (post-traumatic stress disorder)     Chronic insomnia     Restless legs syndrome (RLS)     AGUSTIN (obstructive sleep apnea)- mild (AHI 9)     Current Outpatient Medications   Medication Sig Dispense Refill     fluconazole (DIFLUCAN) 150 MG tablet Take 1 tablet (150 mg) by mouth once for 1 dose 1 tablet 0     acetaminophen (TYLENOL) 325 MG tablet Take 325-650 mg by mouth every 6 hours as needed        albuterol (PROAIR HFA) 108 (90 Base) MCG/ACT inhaler Inhale 2 puffs into the lungs every 4 hours as needed for shortness of breath / dyspnea or wheezing 18 g 3     albuterol (PROVENTIL) (2.5 MG/3ML) 0.083% neb solution Take 1 vial (2.5 mg) by nebulization every 6 hours as needed for shortness of breath / dyspnea 3 mL 3     aspirin 81 MG tablet Take 81 mg by mouth daily        camphor-menthol (SARNA) 0.5-0.5 % external lotion Apply 1 mL topically every 6 hours as needed for skin care Apply to palms of hands 2-3 times daily 59 mL 1     clonazePAM (KLONOPIN) 0.5 MG tablet TAKE ONE-HALF TO ONE TABLET BY MOUTH AT BEDTIME AS NEEDED 45 tablet 2     diphenhydrAMINE (BENADRYL) 25 MG tablet Take 1-2 tablets (25-50 mg) by mouth every 6 hours as needed for itching or allergies 60 tablet 1     hydrocortisone valerate (WEST-LESLY) 0.2 % external ointment Apply sparingly to affected area three times daily as needed. 60 g 0     ibuprofen (ADVIL/MOTRIN) 600 MG tablet Take 1 tablet (600 mg) by mouth every 6 hours as needed for pain Take w/ food 30 tablet 0     ipratropium - albuterol 0.5 mg/2.5 mg/3 mL (DUONEB) 0.5-2.5 (3) MG/3ML neb solution Take 1 vial (3 mLs) by nebulization every 6 hours as needed for shortness of breath / dyspnea or wheezing 3 mL 1     ketoconazole (NIZORAL) 2 % external cream Apply thin layer to soles of feet twice daily for 2 weeks. 30 g 1     ketorolac (TORADOL) 10 MG tablet TAKE ONE TABLET BY MOUTH EVERY 6 HOURS AS NEEDED FOR PAIN 4 tablet 0     lidocaine, viscous, (XYLOCAINE) 2 % solution Swish and spit 15  mLs in mouth every 3 hours as needed for moderate pain ; Max 8 doses/24 hour period. 100 mL 0     melatonin 3 MG tablet Take 3 mg by mouth nightly as needed        metFORMIN (GLUCOPHAGE XR) 500 MG 24 hr tablet Take 1 tablet (500 mg) by mouth daily (with dinner) 90 tablet 1     metroNIDAZOLE (METROCREAM) 0.75 % external cream Apply thin layer to face twice daily. 45 g 11     mometasone (ELOCON) 0.1 % external cream Apply thin layer twice daily as needed for itchy areas on the hands and feet. 50 g 11     mometasone-formoterol (DULERA) 200-5 MCG/ACT inhaler Inhale 2 puffs into the lungs 2 times daily for 90 days 13 g 3     nortriptyline (PAMELOR) 10 MG capsule Take 1 capsule (10 mg) by mouth At Bedtime Every 3 days 30 capsule 1     pramipexole (MIRAPEX) 0.125 MG tablet Take 1-2 tablets (0.125-0.25 mg) by mouth At Bedtime Take hour before bedtime 180 tablet 3     promethazine (PHENERGAN) 25 MG tablet Take 1 tablet (25 mg) by mouth every 8 hours as needed for nausea or vomiting 10 tablet 0     simvastatin (ZOCOR) 20 MG tablet Take 0.5 tablets (10 mg) by mouth At Bedtime 90 tablet 0     traMADol (ULTRAM) 50 MG tablet Take 1 tablet (50 mg) by mouth every 6 hours as needed for severe pain 10 tablet 0           Objective    /68 (BP Location: Left arm, Patient Position: Semi-Dumont's, Cuff Size: Adult Large)   Pulse 69   Temp 98.4  F (36.9  C) (Oral)   Resp 18   LMP 06/22/2016 (Exact Date)   SpO2 100%   There is no height or weight on file to calculate BMI.  Physical Exam  Vitals reviewed.   Constitutional:       Appearance: Normal appearance.   HENT:      Mouth/Throat:      Mouth: Mucous membranes are moist.   Cardiovascular:      Rate and Rhythm: Normal rate and regular rhythm.      Pulses: Normal pulses.      Heart sounds: Normal heart sounds.   Pulmonary:      Effort: Pulmonary effort is normal.      Breath sounds: Normal breath sounds.   Abdominal:      Palpations: Abdomen is soft.      Tenderness: There is  abdominal tenderness (mild > LLq tenderness). There is no guarding or rebound.      Comments: Increased gas symptoms   Neurological:      General: No focal deficit present.      Mental Status: She is alert and oriented to person, place, and time.   Psychiatric:         Mood and Affect: Mood normal.

## 2022-10-06 NOTE — PATIENT INSTRUCTIONS
Continue antibiotic courses from ER -     Diflucan - for yeast     Increases probiotics to daily    Try gasX for gas symptoms     Screen stools for routine viral, bacterial, giardia infections    Recheck with primary next week.

## 2022-10-06 NOTE — LETTER
[facility]  [facility] [place]  [address], [address]  [place] [address]  [phone]  [phone]      10/6/2022    Re: Jessie Seymour      TO WHOM IT MAY CONCERN:    Jessie Seymour  was seen on 10/06/2022.  Due to medical reasons, she will be off work 10/08/2022. She may return 10/10/2022 without restrictions.    Cordially    Roshan Pelayo MD

## 2022-10-06 NOTE — PROGRESS NOTES
Jacy is a 51 year old who is being evaluated via a billable video visit.      Patient completed E-Check in. Questionnaires blown in and patient checked in without being called.     .  How would you like to obtain your AVS? Kamilah  If the video visit is dropped, the invitation should be resent by: Other e-mail: My chart  Will anyone else be joining your video visit? No        Assessment & Plan     Colitis  Poor oral inake and fatigue still present, hematochezia did resolve although stool is still loose. She may do well with a liter normal saline bolus. I placed an ADS referral for IVF and called  I will do a follow up in 1-2 weeks or sooner if symptoms worsen or persist.  - promethazine (PHENERGAN) 25 MG tablet; Take 1 tablet (25 mg) by mouth every 8 hours as needed for nausea or vomiting  - traMADol (ULTRAM) 50 MG tablet; Take 1 tablet (50 mg) by mouth every 6 hours as needed for severe pain  - Referral to Acute and Diagnostic Services (Day of diagnostic / First order acute); Future    Dehydration  As #1  - promethazine (PHENERGAN) 25 MG tablet; Take 1 tablet (25 mg) by mouth every 8 hours as needed for nausea or vomiting  - traMADol (ULTRAM) 50 MG tablet; Take 1 tablet (50 mg) by mouth every 6 hours as needed for severe pain  - Referral to Acute and Diagnostic Services (Day of diagnostic / First order acute); Future    Abdominal pain, generalized  - promethazine (PHENERGAN) 25 MG tablet; Take 1 tablet (25 mg) by mouth every 8 hours as needed for nausea or vomiting  - traMADol (ULTRAM) 50 MG tablet; Take 1 tablet (50 mg) by mouth every 6 hours as needed for severe pain  - Referral to Acute and Diagnostic Services (Day of diagnostic / First order acute); Future    Nausea  - promethazine (PHENERGAN) 25 MG tablet; Take 1 tablet (25 mg) by mouth every 8 hours as needed for nausea or vomiting  - traMADol (ULTRAM) 50 MG tablet; Take 1 tablet (50 mg) by mouth every 6 hours as needed for severe pain  - Referral to Acute  and Diagnostic Services (Day of diagnostic / First order acute); Future        No follow-ups on file.    Roshan Pelayo MD  [facility] [place] Louie is a 51 year old accompanied by her Self, presenting for the following health issues:  No chief complaint on file.    ED follow up for colitis, stool studies negative. The patient is currently on ceftin and flygl for 10 days. Diarrhea is still present but without blood, oral intake is still poor.         Impression:        03/2022                            - The examined portion of the ileum was normal.                             - One 5 mm polyp in the ascending colon, removed                             with a cold snare. Resected and retrieved.                             - Two 3 to 5 mm polyps at the recto-sigmoid colon,                             removed with a cold snare. Resected and retrieved.                             - Diverticulosis in the sigmoid colon and in the                             descending colon.                             - The distal rectum and anal verge are normal on                             retroflexion view.    IMPRESSION: 10/3/2022    1.  Probable minor splenic flexure colitis rather than simply decompressed colon. This may be infectious, inflammatory, or ischemic.   2.  No other acute abnormality within limitations of unenhanced exam. No obstructive uropathy or urolithiasis.   3.  Diverticulosis descending and sigmoid colon.        History of Present Illness       Reason for visit:  Stomach issues  Symptom onset:  3-7 days ago  Symptoms include:  Pain cc ramping  Symptom intensity:  Moderate  Symptom progression:  Staying the same  Had these symptoms before:  Yes  Has tried/received treatment for these symptoms:  Yes  Previous treatment was successful:  Yes  Prior treatment description:  Rest diet antibiotics  What makes it worse:  Eating  What makes it better:  Not eating rest    She eats 2-3  servings of fruits and vegetables daily.She consumes 2 sweetened beverage(s) daily.She exercises with enough effort to increase her heart rate 30 to 60 minutes per day.  She exercises with enough effort to increase her heart rate 3 or less days per week. She is missing 2 dose(s) of medications per week.  She is not taking prescribed medications regularly due to remembering to take.    Today's PHQ-9         PHQ-9 Total Score: 13    PHQ-9 Q9 Thoughts of better off dead/self-harm past 2 weeks :   Not at all    How difficult have these problems made it for you to do your work, take care of things at home, or get along with other people: Somewhat difficult  Today's BON-7 Score: 7       Review of Systems   Constitutional, HEENT, cardiovascular, pulmonary, GI, , musculoskeletal, neuro, skin, endocrine and psych systems are negative, except as otherwise noted.      Objective           Vitals:  No vitals were obtained today due to virtual visit.    Physical Exam   GENERAL: Healthy, alert and no distress  EYES: Eyes grossly normal to inspection.  No discharge or erythema, or obvious scleral/conjunctival abnormalities.  RESP: No audible wheeze, cough, or visible cyanosis.  No visible retractions or increased work of breathing.    SKIN: Visible skin clear. No significant rash, abnormal pigmentation or lesions.  NEURO: Cranial nerves grossly intact.  Mentation and speech appropriate for age.  PSYCH: Mentation appears normal, affect normal/bright, judgement and insight intact, normal speech and appearance well-groomed.            Video-Visit Details    Video Start Time: 09:50AM    Type of service:  Video Visit    Video End Time:10:19 AM    Originating Location (pt. Location): Home    Distant Location (provider location):  [facility]     Platform used for Video Visit: [employer]

## 2022-10-07 PROCEDURE — 87329 GIARDIA AG IA: CPT | Performed by: INTERNAL MEDICINE

## 2022-10-07 PROCEDURE — 87506 IADNA-DNA/RNA PROBE TQ 6-11: CPT | Performed by: INTERNAL MEDICINE

## 2022-10-07 PROCEDURE — 87328 CRYPTOSPORIDIUM AG IA: CPT | Performed by: INTERNAL MEDICINE

## 2022-10-10 LAB
C PARVUM AG STL QL IA: NEGATIVE
G LAMBLIA AG STL QL IA: NEGATIVE

## 2022-10-22 ENCOUNTER — HEALTH MAINTENANCE LETTER (OUTPATIENT)
Age: 52
End: 2022-10-22

## 2023-01-28 ENCOUNTER — MYC MEDICAL ADVICE (OUTPATIENT)
Dept: FAMILY MEDICINE | Facility: CLINIC | Age: 53
End: 2023-01-28
Payer: COMMERCIAL

## 2023-01-28 NOTE — LETTER
[facility]  [address], [address]  [place] [address]  Phone: [phone]  Fax: [phone]  February 6, 2023    Jessie Seymour  [address] [address]      Dear Jessie,    We care about your health and have reviewed your health plan including your medical conditions, medications, and lab results.  Based on this review, it is recommended that you follow up regarding the following health topic(s):  -Asthma  -Depression  -Breast Cancer Screening    We recommend you take the following action(s):  -schedule a MAMMOGRAM which is due. Please disregard this reminder if you have had this exam elsewhere within the last 1-2 years please let us know so we can update your records.   -Complete and return the attached ASTHMA CONTROL TEST.  If your total score is 19 or less or you have been to the ER or urgent care for your asthma, then please schedule an asthma followup appointment.  -Complete and return the attached PHQ-9 Form.  If your total score is greater than 9, please schedule a followup appointment.  If you answer Yes to question 9, call your clinic between the hours of 8 to 5.  You may also call the Suicide Hotline at 5-435-168-IHIV (3178) any time.     Please call us at the [facility] [phone] (or use [employer]) to address the above recommendations.     Thank you for trusting [facility] and we appreciate the opportunity to serve you.  We look forward to supporting your healthcare needs in the future.    Healthy Regards,    Your Health Care Team  [facility]

## 2023-01-29 NOTE — TELEPHONE ENCOUNTER
Patient Quality Outreach    Patient is due for the following:   Asthma  -  ACT needed  Breast Cancer Screening - Mammogram  Depression  -  PHQ-9 needed      Topic Date Due     Zoster (Shingles) Vaccine (1 of 2) Never done     Hepatitis B Vaccine (2 of 3 - 19+ 3-dose series) 06/20/2017     Flu Vaccine (1) 09/01/2022     COVID-19 Vaccine (5 - Booster for Pfizer series) 09/13/2022     Chronic Opioid Use -  Treatment Agreement (CSA) and Urine Drug Screen    Next Steps:   Patient was assigned appropriate questionnaire to complete    Type of outreach:    Sent [employer] message.      Questions for provider review:    None     Kimberly Neil, [facility]  Chart routed to Care Team.

## 2023-04-01 ENCOUNTER — HEALTH MAINTENANCE LETTER (OUTPATIENT)
Age: 53
End: 2023-04-01

## 2023-04-03 ENCOUNTER — MYC MEDICAL ADVICE (OUTPATIENT)
Dept: FAMILY MEDICINE | Facility: CLINIC | Age: 53
End: 2023-04-03
Payer: COMMERCIAL

## 2023-04-03 NOTE — TELEPHONE ENCOUNTER
Patient Quality Outreach    Patient is due for the following:   Asthma  -  ACT needed and Asthma follow-up visit  Breast Cancer Screening - Mammogram  Depression  -  PHQ-9 needed and Depression follow-up visit      Topic Date Due     Zoster (Shingles) Vaccine (1 of 2) Never done     Hepatitis B Vaccine (2 of 3 - 19+ 3-dose series) 06/20/2017     Flu Vaccine (1) 09/01/2022     COVID-19 Vaccine (5 - Booster for Pfizer series) 09/13/2022     Chronic Opioid Use -  Urine Drug Screen and PHQ-9    Next Steps:   Schedule a office visit for a medication recheck    Type of outreach:    Sent [employer] message.      Questions for provider review:    None     Kimberly Neil, [facility]  Chart routed to Care Team.

## 2023-04-03 NOTE — LETTER
April 10, 2023      Jessie Seymour  [address] [place] [address]              Dear Jessie,      We care about your health and have reviewed your health plan including your medical conditions, medications, and lab results.  Based on this review, it is recommended that you follow up regarding the following health topic(s):  -Asthma  -Depression  -Breast Cancer Screening     We recommend you take the following action(s):  -schedule a FOLLOWUP APPOINTMENT.  -schedule a MAMMOGRAM which is due. Please disregard this reminder if you have had this exam elsewhere within the last 1-2 years please let us know so we can update your records.   -Complete and return the attached ASTHMA CONTROL TEST.  If your total score is 19 or less or you have been to the ER or urgent care for your asthma, then please schedule an asthma followup appointment.  -Complete and return the attached PHQ-9 Form.  If your total score is greater than 9, please schedule a followup appointment.  If you answer Yes to question 9, call your clinic between the hours of 8 to 5.  You may also call the Suicide Hotline at 8-108-622-AIGV (1018) any time.     Please call us at the [facility] [phone] (or use [employer]) to address the above recommendations.      Thank you for trusting [facility] and we appreciate the opportunity to serve you.  We look forward to supporting your healthcare needs in the future.     Healthy Regards,     Your Health Care Team at [facility]

## 2023-06-06 ENCOUNTER — OFFICE VISIT (OUTPATIENT)
Dept: URGENT CARE | Facility: URGENT CARE | Age: 53
End: 2023-06-06
Payer: COMMERCIAL

## 2023-06-06 VITALS
SYSTOLIC BLOOD PRESSURE: 117 MMHG | BODY MASS INDEX: 37.97 KG/M2 | HEART RATE: 86 BPM | TEMPERATURE: 97.1 F | RESPIRATION RATE: 16 BRPM | OXYGEN SATURATION: 100 % | WEIGHT: 205 LBS | DIASTOLIC BLOOD PRESSURE: 76 MMHG

## 2023-06-06 DIAGNOSIS — B37.9 ANTIBIOTIC-INDUCED YEAST INFECTION: ICD-10-CM

## 2023-06-06 DIAGNOSIS — J01.90 ACUTE SINUSITIS WITH COEXISTING CONDITION, NEED PROPHYLACTIC TREATMENT: Primary | ICD-10-CM

## 2023-06-06 DIAGNOSIS — R07.0 THROAT PAIN: ICD-10-CM

## 2023-06-06 DIAGNOSIS — T36.95XA ANTIBIOTIC-INDUCED YEAST INFECTION: ICD-10-CM

## 2023-06-06 LAB
DEPRECATED S PYO AG THROAT QL EIA: NEGATIVE
GROUP A STREP BY PCR: NOT DETECTED

## 2023-06-06 PROCEDURE — 99214 OFFICE O/P EST MOD 30 MIN: CPT | Performed by: PHYSICIAN ASSISTANT

## 2023-06-06 PROCEDURE — 87651 STREP A DNA AMP PROBE: CPT | Performed by: PHYSICIAN ASSISTANT

## 2023-06-06 RX ORDER — FLUCONAZOLE 150 MG/1
150 TABLET ORAL ONCE
Qty: 1 TABLET | Refills: 0 | Status: SHIPPED | OUTPATIENT
Start: 2023-06-06 | End: 2023-06-06

## 2023-06-06 RX ORDER — CETIRIZINE HYDROCHLORIDE 10 MG/1
10 TABLET ORAL DAILY
Qty: 30 TABLET | Refills: 0 | Status: SHIPPED | OUTPATIENT
Start: 2023-06-06

## 2023-06-06 RX ORDER — IBUPROFEN 600 MG/1
600 TABLET, FILM COATED ORAL EVERY 6 HOURS PRN
Qty: 30 TABLET | Refills: 0 | Status: SHIPPED | OUTPATIENT
Start: 2023-06-06 | End: 2023-06-06

## 2023-06-06 RX ORDER — KETOROLAC TROMETHAMINE 10 MG/1
1 TABLET, FILM COATED ORAL EVERY 6 HOURS PRN
Qty: 4 TABLET | Refills: 0 | Status: SHIPPED | OUTPATIENT
Start: 2023-06-06 | End: 2023-07-11

## 2023-06-06 ASSESSMENT — ENCOUNTER SYMPTOMS
WHEEZING: 0
SINUS PRESSURE: 1
PALPITATIONS: 0
GASTROINTESTINAL NEGATIVE: 1
FEVER: 1
CARDIOVASCULAR NEGATIVE: 1
COUGH: 1
SINUS PAIN: 1
RHINORRHEA: 1
SORE THROAT: 1
FATIGUE: 0
CHILLS: 0
SHORTNESS OF BREATH: 0
HEADACHES: 1

## 2023-06-06 ASSESSMENT — PAIN SCALES - GENERAL: PAINLEVEL: SEVERE PAIN (7)

## 2023-06-06 NOTE — LETTER
June 6, 2023      Jessie Seymour  [address] [place] [address]        To Whom It May Concern:    Jessie Seymour was seen in urgent care clinic today and is unable to work today due to her symptoms.  Please feel free to contact me via phone if you have any questions or concerns.        Sincerely,      Anamika See SAM Mcdonnell

## 2023-06-06 NOTE — PROGRESS NOTES
Alejandra Louie is a 52 year old, presenting for the following health issues:  Sinus Problem (Green/brown nasal drainage, sinus pain/pressure - beginning three days ago. ), Eye Problem (Eye swelling noticed yesterday.), and Pharyngitis (Throat pain off and on for one week. )    HPI   Acute Illness  Acute illness concerns:   Onset/Duration: 1week  Symptoms:  Fever: YES  Chills/Sweats: No  Headache (location?): YES  Sinus Pressure: YES  Conjunctivitis:  YES- eyes swollen  Ear Pain: no  Rhinorrhea: YES  Congestion: YES  Sore Throat: YES  Cough: YES-productive of yellow sputum  Wheeze: No  Decreased Appetite: No  Nausea: No  Vomiting: No  Diarrhea: No  Dysuria/Freq.: No  Dysuria or Hematuria: No  Fatigue/Achiness: No  Sick/Strep Exposure: No  Therapies tried and outcome: rest,fluids,cough meds,tylenol,ASA with minimal relief    Patient Active Problem List   Diagnosis     Chronic low back pain     MEDICAL HISTORY OF - VERY DIFFICULT LAB DRAW and IV START     Allergic rhinitis     Tobacco use disorder     Family history of breast cancer     Intermittent asthma     Migraine headache     Melasma     Hiatal hernia     Alpha-thalassemia (H)     Aortic regurgitation     Mild major depression (H)     Morbid obesity (H)     Hyperlipidemia LDL goal <100     Pre-diabetes     Anxiety     PTSD (post-traumatic stress disorder)     Chronic insomnia     Restless legs syndrome (RLS)     AGUSTIN (obstructive sleep apnea)- mild (AHI 9)     Current Outpatient Medications   Medication     aspirin 81 MG tablet     clonazePAM (KLONOPIN) 0.5 MG tablet     diphenhydrAMINE (BENADRYL) 25 MG tablet     melatonin 3 MG tablet     metFORMIN (GLUCOPHAGE XR) 500 MG 24 hr tablet     pramipexole (MIRAPEX) 0.125 MG tablet     simvastatin (ZOCOR) 20 MG tablet     acetaminophen (TYLENOL) 325 MG tablet     albuterol (PROAIR HFA) 108 (90 Base) MCG/ACT inhaler     albuterol (PROVENTIL) (2.5 MG/3ML) 0.083% neb solution     camphor-menthol (SARNA) 0.5-0.5 %  external lotion     hydrocortisone valerate (WEST-LESLY) 0.2 % external ointment     ibuprofen (ADVIL/MOTRIN) 600 MG tablet     ipratropium - albuterol 0.5 mg/2.5 mg/3 mL (DUONEB) 0.5-2.5 (3) MG/3ML neb solution     ketoconazole (NIZORAL) 2 % external cream     ketorolac (TORADOL) 10 MG tablet     lidocaine, viscous, (XYLOCAINE) 2 % solution     metroNIDAZOLE (METROCREAM) 0.75 % external cream     mometasone (ELOCON) 0.1 % external cream     mometasone-formoterol (DULERA) 200-5 MCG/ACT inhaler     nortriptyline (PAMELOR) 10 MG capsule     promethazine (PHENERGAN) 25 MG tablet     No current facility-administered medications for this visit.        Allergies   Allergen Reactions     Loratadine Other (See Comments)     Dries her up and gets rare sinus infection     Morphine Itching     PN: LW Reaction: Itching, Pruritis     Oxycodone Nausea and Vomiting and Itching     Other reaction(s): Diaphoresis  Other reaction(s): Diaphoresis     Sumatriptan      Worsens migraine     Zofran [Ondansetron]      CARDIO recommended never to take this medication      Hydrocodone-Acetaminophen Anxiety     Morphine And Related Anxiety     Becomes tearful         Review of Systems   Constitutional: Positive for fever. Negative for chills and fatigue.   HENT: Positive for congestion, rhinorrhea, sinus pressure, sinus pain and sore throat. Negative for ear discharge, ear pain and hearing loss.    Respiratory: Positive for cough. Negative for shortness of breath and wheezing.    Cardiovascular: Negative.  Negative for chest pain, palpitations and peripheral edema.   Gastrointestinal: Negative.    Allergic/Immunologic: Negative for environmental allergies.   Neurological: Positive for headaches.   All other systems reviewed and are negative.           Objective    /76 (BP Location: Left arm, Patient Position: Sitting, Cuff Size: Adult Large)   Pulse 86   Temp 97.1  F (36.2  C) (Tympanic)   Resp 16   Wt 93 kg (205 lb)   LMP  06/22/2016 (Exact Date)   SpO2 100%   BMI 37.97 kg/m    Body mass index is 37.97 kg/m .  Physical Exam  Vitals and nursing note reviewed.   Constitutional:       General: She is not in acute distress.     Appearance: Normal appearance. She is well-developed. She is obese. She is not ill-appearing.   HENT:      Head: Normocephalic and atraumatic.      Comments: TMs are intact without any erythema or bulging bilaterally.  Airway is patent.     Nose: Congestion and rhinorrhea present. Rhinorrhea is purulent.      Right Turbinates: Swollen.      Left Turbinates: Swollen.      Right Sinus: Maxillary sinus tenderness and frontal sinus tenderness present.      Left Sinus: Maxillary sinus tenderness and frontal sinus tenderness present.      Mouth/Throat:      Lips: Pink.      Mouth: Mucous membranes are moist.      Pharynx: Oropharynx is clear. Uvula midline. No pharyngeal swelling, oropharyngeal exudate or posterior oropharyngeal erythema.      Tonsils: No tonsillar exudate.   Eyes:      General: No scleral icterus.     Extraocular Movements: Extraocular movements intact.      Conjunctiva/sclera: Conjunctivae normal.      Pupils: Pupils are equal, round, and reactive to light.   Neck:      Thyroid: No thyromegaly.   Cardiovascular:      Rate and Rhythm: Normal rate and regular rhythm.      Pulses: Normal pulses.      Heart sounds: Normal heart sounds, S1 normal and S2 normal. No murmur heard.     No friction rub. No gallop.   Pulmonary:      Effort: Pulmonary effort is normal. No accessory muscle usage, respiratory distress or retractions.      Breath sounds: Normal breath sounds and air entry. No stridor. No decreased breath sounds, wheezing, rhonchi or rales.   Musculoskeletal:      Cervical back: Normal range of motion and neck supple.   Lymphadenopathy:      Cervical: No cervical adenopathy.   Skin:     General: Skin is warm and dry.      Nails: There is no clubbing.   Neurological:      Mental Status: She is alert  and oriented to person, place, and time.   Psychiatric:         Mood and Affect: Mood normal.         Behavior: Behavior normal.         Thought Content: Thought content normal.         Judgment: Judgment normal.       Results for orders placed or performed in visit on 06/06/23 (from the past 24 hour(s))   Streptococcus A Rapid Screen w/Reflex to PCR - Clinic Collect    Specimen: Throat; Swab   Result Value Ref Range    Group A Strep antigen Negative Negative       Assessment/Plan:  Acute sinusitis with coexisting condition, need prophylactic treatment:  Will treat with [id number] for sinusitis and take with food/probiotics to minimize GI upset.  Will refill her toradol prn HA/migraines and zyrtec/pseudofed for sinus congestion.   Rest, fluids, chicken soup.  Recheck in clinic if symptoms worsen or if symptoms do not improve.    -     amoxicillin-clavulanate (AUGMENTIN) 875-125 MG tablet; Take 1 tablet by mouth 2 times daily for 10 days  -     cetirizine (ZYRTEC) 10 MG tablet; Take 1 tablet (10 mg) by mouth daily  -     ketorolac (TORADOL) 10 MG tablet; Take 1 tablet (10 mg) by mouth every 6 hours as needed for pain    Throat pain:  RST is negative, will send for strep PCR testing. Recommend tylenol/ibuprofen prn pain/fever, warm salt water gargles, lozenges or cough drops.  -     Streptococcus A Rapid Screen w/Reflex to PCR - Clinic Collect  -     Group A Streptococcus PCR Throat Swab    Antibiotic-induced yeast infection  -     fluconazole (DIFLUCAN) 150 MG tablet; Take 1 tablet (150 mg) by mouth once for 1 dose        Anamika Mcdonnell PA-C

## 2023-06-16 ENCOUNTER — NURSE TRIAGE (OUTPATIENT)
Dept: NURSING | Facility: CLINIC | Age: 53
End: 2023-06-16
Payer: COMMERCIAL

## 2023-06-16 NOTE — TELEPHONE ENCOUNTER
Nurse Triage SBAR    Situation: Abdominal pain and back pain    Background: Patient calling. Pain has been going on for 3 days.     Assessment: Tightness and pain in right upper quadrant of her abdomen and back. Pain comes and goes. Pain is often there when she eats. No black or blood in her stools. She states she has some difficulty breathing. Pain is 4-5/10. Pt has been a little dizzy. Pain lasts longer than 10 minutes.     Protocol Recommended Disposition: Emergency Department    Recommendation: According to the protocol, Patient should go to the ED now. Advised Patient that the patient needs to go to the ED now. Care advice given. Patient verbalizes understanding but states she wants an appt with her provider instead. RN explained she should be seen in the ED to rule out any cardiac signs of emergent concerns. RN is not sure if she will go into the ED. Pt is still requesting an appt. After pt spoke with scheduling and no appts next week with her PCP available she stated she will go into the ED.     Gemma Leary RN Nursing Advisor 6/16/2023 3:26 PM     Reason for Disposition    [1] Pain lasts > 10 minutes AND [2] age > 50    [1] Pain lasts > 10 minutes AND [2] age > 35 AND [3] at least one cardiac risk factor (i.e., hypertension, diabetes, obesity, smoker or strong family history of heart disease)    [1] Pain lasts > 10 minutes AND [2] difficulty breathing    Additional Information    Negative: SEVERE difficulty breathing (e.g., struggling for each breath, speaks in single words)    Negative: Shock suspected (e.g., cold/pale/clammy skin, too weak to stand, low BP, rapid pulse)    Negative: Difficult to awaken or acting confused (e.g., disoriented, slurred speech)    Negative: Passed out (i.e., lost consciousness, collapsed and was not responding)    Negative: Visible sweat on face or sweat dripping down face    Negative: Sounds like a life-threatening emergency to the triager    Negative: Followed an abdomen  (stomach) injury    Negative: Chest pain    Negative: [1] SEVERE pain (e.g., excruciating) AND [2] present > 1 hour    Protocols used: ABDOMINAL PAIN - UPPER-A-AH

## 2023-06-19 ENCOUNTER — PATIENT OUTREACH (OUTPATIENT)
Dept: CARE COORDINATION | Facility: CLINIC | Age: 53
End: 2023-06-19
Payer: COMMERCIAL

## 2023-07-03 ENCOUNTER — PATIENT OUTREACH (OUTPATIENT)
Dept: CARE COORDINATION | Facility: CLINIC | Age: 53
End: 2023-07-03
Payer: COMMERCIAL

## 2023-07-11 ENCOUNTER — TELEPHONE (OUTPATIENT)
Dept: FAMILY MEDICINE | Facility: CLINIC | Age: 53
End: 2023-07-11

## 2023-07-11 ENCOUNTER — OFFICE VISIT (OUTPATIENT)
Dept: FAMILY MEDICINE | Facility: CLINIC | Age: 53
End: 2023-07-11
Attending: FAMILY MEDICINE
Payer: COMMERCIAL

## 2023-07-11 VITALS
TEMPERATURE: 97.2 F | HEART RATE: 89 BPM | HEIGHT: 61 IN | RESPIRATION RATE: 18 BRPM | SYSTOLIC BLOOD PRESSURE: 128 MMHG | DIASTOLIC BLOOD PRESSURE: 70 MMHG | WEIGHT: 205 LBS | OXYGEN SATURATION: 99 % | BODY MASS INDEX: 38.71 KG/M2

## 2023-07-11 DIAGNOSIS — J45.901 EXACERBATION OF ASTHMA, UNSPECIFIED ASTHMA SEVERITY, UNSPECIFIED WHETHER PERSISTENT: ICD-10-CM

## 2023-07-11 DIAGNOSIS — R10.32 LLQ ABDOMINAL PAIN: ICD-10-CM

## 2023-07-11 DIAGNOSIS — Z13.1 SCREENING FOR DIABETES MELLITUS: ICD-10-CM

## 2023-07-11 DIAGNOSIS — D56.0 ALPHA-THALASSEMIA (H): ICD-10-CM

## 2023-07-11 DIAGNOSIS — R05.3 PERSISTENT COUGH: ICD-10-CM

## 2023-07-11 DIAGNOSIS — Z12.31 ENCOUNTER FOR SCREENING MAMMOGRAM FOR BREAST CANCER: ICD-10-CM

## 2023-07-11 DIAGNOSIS — E78.5 HYPERLIPIDEMIA LDL GOAL <100: ICD-10-CM

## 2023-07-11 DIAGNOSIS — R73.03 PRE-DIABETES: ICD-10-CM

## 2023-07-11 DIAGNOSIS — F51.01 PRIMARY INSOMNIA: ICD-10-CM

## 2023-07-11 DIAGNOSIS — F17.200 NICOTINE DEPENDENCE, UNCOMPLICATED, UNSPECIFIED NICOTINE PRODUCT TYPE: ICD-10-CM

## 2023-07-11 DIAGNOSIS — R06.02 SOB (SHORTNESS OF BREATH): ICD-10-CM

## 2023-07-11 DIAGNOSIS — R00.0 TACHYCARDIA: ICD-10-CM

## 2023-07-11 DIAGNOSIS — B35.3 TINEA PEDIS OF BOTH FEET: ICD-10-CM

## 2023-07-11 DIAGNOSIS — L29.9 ITCHING: ICD-10-CM

## 2023-07-11 DIAGNOSIS — Z51.81 ENCOUNTER FOR THERAPEUTIC DRUG MONITORING: ICD-10-CM

## 2023-07-11 DIAGNOSIS — Z00.00 ROUTINE GENERAL MEDICAL EXAMINATION AT A HEALTH CARE FACILITY: Primary | ICD-10-CM

## 2023-07-11 DIAGNOSIS — Z87.891 PERSONAL HISTORY OF TOBACCO USE: ICD-10-CM

## 2023-07-11 DIAGNOSIS — F32.0 MILD MAJOR DEPRESSION (H): ICD-10-CM

## 2023-07-11 DIAGNOSIS — J01.90 ACUTE SINUSITIS WITH COEXISTING CONDITION, NEED PROPHYLACTIC TREATMENT: ICD-10-CM

## 2023-07-11 DIAGNOSIS — E66.01 MORBID OBESITY (H): ICD-10-CM

## 2023-07-11 DIAGNOSIS — G25.81 RESTLESS LEGS SYNDROME (RLS): ICD-10-CM

## 2023-07-11 LAB — HBA1C MFR BLD: 5.8 % (ref 0–5.6)

## 2023-07-11 PROCEDURE — 36415 COLL VENOUS BLD VENIPUNCTURE: CPT | Performed by: FAMILY MEDICINE

## 2023-07-11 PROCEDURE — G0480 DRUG TEST DEF 1-7 CLASSES: HCPCS | Performed by: FAMILY MEDICINE

## 2023-07-11 PROCEDURE — 90715 TDAP VACCINE 7 YRS/> IM: CPT | Performed by: FAMILY MEDICINE

## 2023-07-11 PROCEDURE — 90750 HZV VACC RECOMBINANT IM: CPT | Performed by: FAMILY MEDICINE

## 2023-07-11 PROCEDURE — 99214 OFFICE O/P EST MOD 30 MIN: CPT | Mod: 25 | Performed by: FAMILY MEDICINE

## 2023-07-11 PROCEDURE — 2894A URINE DRUG CONFIRMATION PANEL: CPT | Performed by: FAMILY MEDICINE

## 2023-07-11 PROCEDURE — 80061 LIPID PANEL: CPT | Performed by: FAMILY MEDICINE

## 2023-07-11 PROCEDURE — 90472 IMMUNIZATION ADMIN EACH ADD: CPT | Performed by: FAMILY MEDICINE

## 2023-07-11 PROCEDURE — 83036 HEMOGLOBIN GLYCOSYLATED A1C: CPT | Performed by: FAMILY MEDICINE

## 2023-07-11 PROCEDURE — 99396 PREV VISIT EST AGE 40-64: CPT | Mod: 25 | Performed by: FAMILY MEDICINE

## 2023-07-11 PROCEDURE — 90471 IMMUNIZATION ADMIN: CPT | Performed by: FAMILY MEDICINE

## 2023-07-11 RX ORDER — CLONAZEPAM 0.5 MG/1
TABLET ORAL
Qty: 45 TABLET | Refills: 2 | Status: CANCELLED | OUTPATIENT
Start: 2023-07-11

## 2023-07-11 RX ORDER — PRAMIPEXOLE DIHYDROCHLORIDE 0.12 MG/1
.125-.25 TABLET ORAL AT BEDTIME
Qty: 180 TABLET | Refills: 3 | Status: SHIPPED | OUTPATIENT
Start: 2023-07-11

## 2023-07-11 RX ORDER — MENTHOL/CAMPHOR 0.5 %-0.5%
1 LOTION (ML) TOPICAL EVERY 6 HOURS PRN
Qty: 59 ML | Refills: 1 | Status: SHIPPED | OUTPATIENT
Start: 2023-07-11

## 2023-07-11 RX ORDER — ALBUTEROL SULFATE 90 UG/1
2 AEROSOL, METERED RESPIRATORY (INHALATION) EVERY 4 HOURS PRN
Qty: 18 G | Refills: 3 | Status: SHIPPED | OUTPATIENT
Start: 2023-07-11

## 2023-07-11 RX ORDER — KETOROLAC TROMETHAMINE 10 MG/1
1 TABLET, FILM COATED ORAL EVERY 6 HOURS PRN
Qty: 4 TABLET | Refills: 11 | Status: SHIPPED | OUTPATIENT
Start: 2023-07-11 | End: 2024-02-01

## 2023-07-11 RX ORDER — METFORMIN HCL 500 MG
500 TABLET, EXTENDED RELEASE 24 HR ORAL
Qty: 90 TABLET | Refills: 1 | Status: SHIPPED | OUTPATIENT
Start: 2023-07-11

## 2023-07-11 RX ORDER — KETOCONAZOLE 20 MG/G
CREAM TOPICAL
Qty: 30 G | Refills: 11 | Status: SHIPPED | OUTPATIENT
Start: 2023-07-11

## 2023-07-11 ASSESSMENT — ANXIETY QUESTIONNAIRES
4. TROUBLE RELAXING: SEVERAL DAYS
7. FEELING AFRAID AS IF SOMETHING AWFUL MIGHT HAPPEN: SEVERAL DAYS
6. BECOMING EASILY ANNOYED OR IRRITABLE: SEVERAL DAYS
GAD7 TOTAL SCORE: 8
IF YOU CHECKED OFF ANY PROBLEMS ON THIS QUESTIONNAIRE, HOW DIFFICULT HAVE THESE PROBLEMS MADE IT FOR YOU TO DO YOUR WORK, TAKE CARE OF THINGS AT HOME, OR GET ALONG WITH OTHER PEOPLE: SOMEWHAT DIFFICULT
5. BEING SO RESTLESS THAT IT IS HARD TO SIT STILL: NOT AT ALL
1. FEELING NERVOUS, ANXIOUS, OR ON EDGE: MORE THAN HALF THE DAYS
3. WORRYING TOO MUCH ABOUT DIFFERENT THINGS: SEVERAL DAYS
2. NOT BEING ABLE TO STOP OR CONTROL WORRYING: MORE THAN HALF THE DAYS
GAD7 TOTAL SCORE: 8

## 2023-07-11 ASSESSMENT — PATIENT HEALTH QUESTIONNAIRE - PHQ9
SUM OF ALL RESPONSES TO PHQ QUESTIONS 1-9: 8
SUM OF ALL RESPONSES TO PHQ QUESTIONS 1-9: 8
10. IF YOU CHECKED OFF ANY PROBLEMS, HOW DIFFICULT HAVE THESE PROBLEMS MADE IT FOR YOU TO DO YOUR WORK, TAKE CARE OF THINGS AT HOME, OR GET ALONG WITH OTHER PEOPLE: SOMEWHAT DIFFICULT

## 2023-07-11 ASSESSMENT — ASTHMA QUESTIONNAIRES: ACT_TOTALSCORE: 17

## 2023-07-11 ASSESSMENT — PAIN SCALES - GENERAL: PAINLEVEL: MILD PAIN (3)

## 2023-07-11 NOTE — TELEPHONE ENCOUNTER
Forms/Letter Request    Type of form/letter: Handicap Parking permit    Have you been seen for this request: Yes      Do we have the form/letter: Yes:      Who is the form from? [place] [employer] Public [employer]  (if other please explain)    Where did/will the form come from? Patient or family brought in       When is form/letter needed by: as soon as able prior to July 31    How would you like the form/letter returned: Mail  Is this the correct address?: Yes  [address] [address]    Patient Notified form requests are processed in 3-5 business days:No    Could we send this information to you in [employer]ed or would you prefer to receive a phone call?:   Patient would like to be contacted via [employer]

## 2023-07-11 NOTE — PATIENT INSTRUCTIONS
Preventive Health Recommendations  Female Ages 50 - 64    Yearly exam: See your health care provider every year in order to  o Review health changes.   o Discuss preventive care.    o Review your medicines if your doctor has prescribed any.      Get a Pap test every three years (unless you have an abnormal result and your provider advises testing more often).    If you get Pap tests with HPV test, you only need to test every 5 years, unless you have an abnormal result.     You do not need a Pap test if your uterus was removed (hysterectomy) and you have not had cancer.    You should be tested each year for STDs (sexually transmitted diseases) if you're at risk.     Have a mammogram every 1 to 2 years.    Have a colonoscopy at age 50, or have a yearly FIT test (stool test). These exams screen for colon cancer.      Have a cholesterol test every 5 years, or more often if advised.    Have a diabetes test (fasting glucose) every three years. If you are at risk for diabetes, you should have this test more often.     If you are at risk for osteoporosis (brittle bone disease), think about having a bone density scan (DEXA).    Shots: Get a flu shot each year. Get a tetanus shot every 10 years.    Nutrition:     Eat at least 5 servings of fruits and vegetables each day.    Eat whole-grain bread, whole-wheat pasta and brown rice instead of white grains and rice.    Get adequate Calcium and Vitamin D.     Lifestyle    Exercise at least 150 minutes a week (30 minutes a day, 5 days a week). This will help you control your weight and prevent disease.    Limit alcohol to one drink per day.    No smoking.     Wear sunscreen to prevent skin cancer.     See your dentist every six months for an exam and cleaning.    See your eye doctor every 1 to 2 years.      How to Quit Smoking  Smoking is a hard habit to break. About half of all people who've ever smoked have been able to quit. Most people who still smoke want to quit. Here are  "some of the best ways to stop smoking.     Keep in mind the health benefits of quitting  The health benefits of quitting start right away. They keep improving the longer you go without smoking. Knowing this can help inspire you to stay on track. These benefits occur at any age. If you're 17 or 70, quitting is a good choice. Some of the health benefits after your last cigarette include:     After 20 minutes:  Your blood pressure and pulse return to normal.    After 8 hours: Your oxygen levels return to normal.    After 2 days: Your ability to smell and taste start to improve as damaged nerves regrow.    After 2 to 3 weeks: Your circulation and lung function improve.    After 1 to 9 months: Your coughing, congestion, and shortness of breath decrease. Your tiredness decreases.    After 1 year: Your risk of heart attack decreases by 50%.    After 5 years: Your risk of lung cancer decreases by 50%. Your risk of stroke becomes the same as a nonsmoker s.  What about going cold turkey?  You may have heard about quitting \"cold turkey.\" This means stopping all at once. Going cold turkey is an option. But it's not the most successful way to quit smoking. Also, trying to cut back slowly often doesn't work as well. This may be because it continues the habit of smoking. You may also inhale more smoke while smoking fewer cigarettes. This leads to the same amount of nicotine in your body.   But quitting cold turkey or cutting back slowly aren't your only choices. Using tobacco cessation medicines with behavioral counseling may be a better option to help you succeed. Talk with your provider about your options for support while you quit smoking.   Get support  Support programs can be a big help, especially for heavy smokers. These groups offer information, ways to change behavior, and peer support. Ask your provider for some resources. Here are some other ways to find support:     Smokefree.gov at www.smokefree.gov  800-QUIT-NOW " (755.121.6902)    American Lung Association at www.lung.org/quit-smoking  539.669.9760    American Cancer Society  at www.cancer.org/quitsmoking  119.208.8842  Support at home is important too. Family and friends can offer praise and reassurance. Ask your friends who smoke to support your decision. Try to stay away from situations that trigger the desire to smoke. This may include smoking with your morning coffee. Or smoking after a meal. Create new routines to help decrease your cravings.   If the smoker in your life finds it hard to quit, encourage them to keep trying. Remind them of all of the benefits to themselves and people they love.   Try over-the-counter nicotine replacements   Nicotine replacement therapy may make it easier to quit. You can buy some aids without a prescription. These include a nicotine patch, gum, and lozenges. But it's best to use these under the care of your healthcare provider. The skin patch gives a steady supply of nicotine. Nicotine gum and lozenges give short-time doses of low levels of nicotine. Both methods reduce the craving for cigarettes. If you have upset stomach (nausea), vomiting, dizziness, weakness, or a fast heartbeat, stop using these products and see your provider.   Ask about prescription medicine  After reviewing your smoking patterns and past attempts to quit, your provider may offer a prescription medicine. These include bupropion, varenicline, a nicotine inhaler, or nasal spray. Each has advantages and side effects. Your provider can go over these with you.   Keep trying  Most smokers try to quit many times before they succeed. It s important not to give up.   [employer] software last reviewed this educational content on 12/1/2019 2000-2022 [employer]. All rights reserved. This information is not intended as a substitute for professional medical care. Always follow your healthcare professional's instructions.        Lung Cancer Screening   Frequently Asked  Questions  If you are at high-risk for lung cancer, getting screened with low-dose computed tomography (LDCT) every year can help save your life. This handout offers answers to some of the most common questions about lung cancer screening. If you have other questions, please call [phone] (1-356.604.2797).     What is it?  Lung cancer screening uses special X-ray technology to create an image of your lung tissue. The exam is quick and easy and takes less than 10 seconds. We don t give you any medicine or use any needles. You can eat before and after the exam. You don t need to change your clothes as long as the clothing on your chest doesn t contain metal. But, you do need to be able to hold your breath for at least 6 seconds during the exam.    What is the goal of lung cancer screening?  The goal of lung cancer screening is to save lives. Many times, lung cancer is not found until a person starts having physical symptoms. Lung cancer screening can help detect lung cancer in the earliest stages when it may be easier to treat.    Who should be screened for lung cancer?  We suggest lung cancer screening for anyone who is at high-risk for lung cancer. You are in the high-risk group if you:      are between the ages of 55 and 79, and    have smoked at least 1 pack of cigarettes a day for 20 or more years, and    still smoke or have quit within the past 15 years.    However, if you have a new cough or shortness of breath, you should talk to your doctor before being screened.    Why does it matter if I have symptoms?  Certain symptoms can be a sign that you have a condition in your lungs that should be checked and treated by your doctor. These symptoms include fever, chest pain, a new or changing cough, shortness of breath that you have never felt before, coughing up blood or unexplained weight loss. Having any of these symptoms can greatly affect the results of lung cancer screening.       Should all smokers get  an LDCT lung cancer screening exam?  It depends. Lung cancer screening is for a very specific group of men and women who have a history of heavy smoking over a long period of time (see  Who should be screened for lung cancer  above).  I am in the high-risk group, but have been diagnosed with cancer in the past. Is LDCT lung cancer screening right for me?  In some cases, you should not have LDCT lung screening, such as when your doctor is already following your cancer with CT scan studies. Your doctor will help you decide if LDCT lung screening is right for you.  Do I need to have a screening exam every year?  Yes. If you are in the high-risk group described earlier, you should get an LDCT lung cancer screening exam every year until you are 79, or are no longer willing or able to undergo screening and possible procedures to diagnose and treat lung cancer.  How effective is LDCT at preventing death from lung cancer?  Studies have shown that LDCT lung cancer screening can lower the risk of death from lung cancer by 20 percent in people who are at high-risk.  What are the risks?  There are some risks and limitations of LDCT lung cancer screening. We want to make sure you understand the risks and benefits, so please let us know if you have any questions. Your doctor may want to talk with you more about these risks.    Radiation exposure: As with any exam that uses radiation, there is a very small increased risk of cancer. The amount of radiation in LDCT is small--about the same amount a person would get from a mammogram. Your doctor orders the exam when he or she feels the potential benefits outweigh the risks.    False negatives: No test is perfect, including LDCT. It is possible that you may have a medical condition, including lung cancer, that is not found during your exam. This is called a false negative result.    False positives and more testing: LDCT very often finds something in the lung that could be cancer, but  in fact is not. This is called a false positive result. False positive tests often cause anxiety. To make sure these findings are not cancer, you may need to have more tests. These tests will be done only if you give us permission. Sometimes patients need a treatment that can have side effects, such as a biopsy. For more information on false positives, see  What can I expect from the results?     Findings not related to lung cancer: Your LDCT exam also takes pictures of areas of your body next to your lungs. In a very small number of cases, the CT scan will show an abnormal finding in one of these areas, such as your kidneys, adrenal glands, liver or thyroid. This finding may not be serious, but you may need more tests. Your doctor can help you decide what other tests you may need, if any.  What can I expect from the results?  About 1 out of 4 LDCT exams will find something that may need more tests. Most of the time, these findings are lung nodules. Lung nodules are very small collections of tissue in the lung. These nodules are very common, and the vast majority--more than 97 percent--are not cancer (benign). Most are normal lymph nodes or small areas of scarring from past infections.  But, if a small lung nodule is found to be cancer, the cancer can be cured more than 90 percent of the time. To know if the nodule is cancer, we may need to get more images before your next yearly screening exam. If the nodule has suspicious features (for example, it is large, has an odd shape or grows over time), we will refer you to a specialist for further testing.  Will my doctor also get the results?  Yes. Your doctor will get a copy of your results.  Is it okay to keep smoking now that there s a cancer screening exam?  No. Tobacco is one of the strongest cancer-causing agents. It causes not only lung cancer, but other cancers and cardiovascular (heart) diseases as well. The damage caused by smoking builds over time. This means  that the longer you smoke, the higher your risk of disease. While it is never too late to quit, the sooner you quit, the better.  Where can I find help to quit smoking?  The best way to prevent lung cancer is to stop smoking. If you have already quit smoking, congratulations and keep it up! For help on quitting smoking, please call [employer] at 3-882-QUITNOW (1-142.555.5644) or the American Cancer Society at 1-462.250.7117 to find local resources near you.  One-on-one health coaching:  If you d prefer to work individually with a health care provider on tobacco cessation, we offer:      Medication Therapy Management:  Our specially trained pharmacists work closely with you and your doctor to help you quit smoking.  Call [phone] or [phone] (toll free).

## 2023-07-11 NOTE — LETTER
My Asthma Action Plan    Name: Jessie Seymour   YOB: 1970  Date: 7/30/2023   My doctor: Roshan Pelayo MD   My clinic: [facility]        My Rescue Medicine:   Albuterol inhaler (Proair/Ventolin/Proventil HFA)  2-4 puffs EVERY 4 HOURS as needed. Use a spacer if recommended by your provider.   My Asthma Severity:   Mild Persistent  Know your asthma triggers: occupational exposure, exercise or sports, emotions, and cold air  upper respiratory infections  dust mites  pollens  insects/rodents          GREEN ZONE   Good Control  I feel good  No cough or wheeze  Can work, sleep and play without asthma symptoms       Take your asthma control medicine every day.     If exercise triggers your asthma, take your rescue medication  15 minutes before exercise or sports, and  During exercise if you have asthma symptoms  Spacer to use with inhaler: If you have a spacer, make sure to use it with your inhaler             YELLOW ZONE Getting Worse  I have ANY of these:  I do not feel good  Cough or wheeze  Chest feels tight  Wake up at night   Keep taking your Green Zone medications  Start taking your rescue medicine:  every 20 minutes for up to 1 hour. Then every 4 hours for 24-48 hours.  If you stay in the Yellow Zone for more than 12-24 hours, contact your doctor.  If you do not return to the Green Zone in 12-24 hours or you get worse, start taking your oral steroid medicine if prescribed by your provider.           RED ZONE Medical Alert - Get Help  I have ANY of these:  I feel awful  Medicine is not helping  Breathing getting harder  Trouble walking or talking  Nose opens wide to breathe       Take your rescue medicine NOW  If your provider has prescribed an oral steroid medicine, start taking it NOW  Call your doctor NOW  If you are still in the Red Zone after 20 minutes and you have not reached your doctor:  Take your rescue medicine again and  Call 911 or go to the emergency room  right away    See your regular doctor within 2 weeks of an Emergency Room or Urgent Care visit for follow-up treatment.          Annual Reminders:  Meet with Asthma Educator,  Flu Shot in the Fall, consider Pneumonia Vaccination for patients with asthma (aged 19 and older).    Pharmacy: [id number] [address] [facility]    Electronically signed by Roshan Pelayo MD   Date: 07/30/23                    Asthma Triggers  How To Control Things That Make Your Asthma Worse    Triggers are things that make your asthma worse.  Look at the list below to help you find your triggers and   what you can do about them. You can help prevent asthma flare-ups by staying away from your triggers.      Trigger                                                          What you can do   Cigarette Smoke  Tobacco smoke can make asthma worse. Do not allow smoking in your home, car or around you.  Be sure no one smokes at a child s day care or school.  If you smoke, ask your health care provider for ways to help you quit.  Ask family members to quit too.  Ask your health care provider for a referral to Quit Plan to help you quit smoking, or call 5-801-497-PLAN.     Colds, Flu, Bronchitis  These are common triggers of asthma. Wash your hands often.  Don t touch your eyes, nose or mouth.  Get a flu shot every year.     Dust Mites  These are tiny bugs that live in cloth or carpet. They are too small to see. Wash sheets and blankets in hot water every week.   Encase pillows and mattress in dust mite proof covers.  Avoid having carpet if you can. If you have carpet, vacuum weekly.   Use a dust mask and HEPA vacuum.   Pollen and Outdoor Mold  Some people are allergic to trees, grass, or weed pollen, or molds. Try to keep your windows closed.  Limit time out doors when pollen count is high.   Ask you health care provider about taking medicine during allergy season.     Animal Dander  Some people are allergic to  skin flakes, urine or saliva from pets with fur or feathers. Keep pets with fur or feathers out of your home.    If you can t keep the pet outdoors, then keep the pet out of your bedroom.  Keep the bedroom door closed.  Keep pets off cloth furniture and away from stuffed toys.     Mice, Rats, and Cockroaches  Some people are allergic to the waste from these pests.   Cover food and garbage.  Clean up spills and food crumbs.  Store grease in the refrigerator.   Keep food out of the bedroom.   Indoor Mold  This can be a trigger if your home has high moisture. Fix leaking faucets, pipes, or other sources of water.   Clean moldy surfaces.  Dehumidify basement if it is damp and smelly.   Smoke, Strong Odors, and Sprays  These can reduce air quality. Stay away from strong odors and sprays, such as perfume, powder, hair spray, paints, smoke incense, paint, cleaning products, candles and new carpet.   Exercise or Sports  Some people with asthma have this trigger. Be active!  Ask your doctor about taking medicine before sports or exercise to prevent symptoms.    Warm up for 5-10 minutes before and after sports or exercise.     Other Triggers of Asthma  Cold air:  Cover your nose and mouth with a scarf.  Sometimes laughing or crying can be a trigger.  Some medicines and food can trigger asthma.

## 2023-07-11 NOTE — NURSING NOTE
Prior to immunization administration, verified patients identity using patient s name and date of birth. Please see Immunization Activity for additional information.     Screening Questionnaire for Adult Immunization    Are you sick today?   No   Do you have allergies to medications, food, a vaccine component or latex?   Yes   Have you ever had a serious reaction after receiving a vaccination?   Side effects to cvoid vaccine   Do you have a long-term health problem with heart, lung, kidney, or metabolic disease (e.g., diabetes), asthma, a blood disorder, no spleen, complement component deficiency, a cochlear implant, or a spinal fluid leak?  Are you on long-term aspirin therapy?   Yes   Do you have cancer, leukemia, HIV/AIDS, or any other immune system problem?   No   Do you have a parent, brother, or sister with an immune system problem?   Yes   In the past 3 months, have you taken medications that affect  your immune system, such as prednisone, other steroids, or anticancer drugs; drugs for the treatment of rheumatoid arthritis, Crohn s disease, or psoriasis; or have you had radiation treatments?   No   Have you had a seizure, or a brain or other nervous system problem?   No   During the past year, have you received a transfusion of blood or blood    products, or been given immune (gamma) globulin or antiviral drug?   No   For women: Are you pregnant or is there a chance you could become       pregnant during the next month?   No   Have you received any vaccinations in the past 4 weeks?   No     Immunization questionnaire was positive for at least one answer.  Notified Dr. Pelayo .      Patient instructed to remain in clinic for 15 minutes afterwards, and to report any adverse reactions.     Screening performed by Dena Dong MA on 7/11/2023 at 5:10 PM.

## 2023-07-11 NOTE — PROGRESS NOTES
SUBJECTIVE:   CC: Jacy is an 52 year old who presents for preventive health visit.       7/11/2023     3:51 PM   Additional Questions   Roomed by Dena GU CMA   Accompanied by self     Forms 7/11/2023   Any forms needing to be completed Yes         7/11/2023     3:51 PM   Patient Reported Additional Medications   Patient reports taking the following new medications see's an herbalist, sea simms   EYE exam exam done within the last year  Dental appointment hasn't been done  Sun screen is being used     Healthy Habits:     Taking medications regularly:  5  History of Present Illness     Asthma:  She presents for follow up of asthma.  She has some cough, some wheezing, and no shortness of breath. She is using a relief medication a few times a month. She typically misses taking her controller medication 1 time(s) per week.Patient is aware of the following triggers: same as previous visit, cold air, exercise or sports and humidity. The patient has had a visit to the Emergency Room, Urgent Care or Hospital due to asthma since the last clinic visit. She has been to the Emergency Room or Urgent Care 1 time.She has had a Hospitalization 0 times.    Back Pain:  She presents for follow up of back pain. Patient's back pain is a chronic problem.  Location of back pain:  Right lower back, left lower back, right middle of back, left middle of back, right upper back, left upper back, right side of neck, left side of neck, right shoulder and left shoulder  Description of back pain: burning, dull ache and gnawing  Back pain spreads: right buttocks, right thigh and left thigh    Since patient first noticed back pain, pain is: unchanged  Does back pain interfere with her job:  Yes      Mental Health Follow-up:  Patient presents to follow-up on Depression & Anxiety.Patient's depression since last visit has been:  No change  The patient is having other symptoms associated with depression.  Patient's anxiety since last visit has been:  " Bad  The patient is having other symptoms associated with anxiety.  Any significant life events: job concerns, housing concerns, grief or loss and other  Patient is feeling anxious or having panic attacks.  Patient has no concerns about alcohol or drug use.    Diabetes:   She presents for follow up of diabetes.  She is not checking blood glucose. She is concerned about low blood sugar, several less than 70 in the past few weeks and other. She is having redness, sores, or blisters on feet, blurry vision and weight loss.         Hyperlipidemia:  She presents for follow up of hyperlipidemia.  She is not taking medication to lower cholesterol. She is having myalgia or other side effects to statin medications.    Headaches:   Since the patient's last clinic visit, headaches are: no change  The patient is getting headaches:  2 per month  She is not able to do normal daily activities when she has a migraine.  The patient is taking the following rescue/relief medications:  Ibuprofen (Advil, Motrin), Naproxyn (Aleve), Tylenol, Excedrin and other   Patient states \"I get some relief\" from the rescue/relief medications.   The patient is taking the following medications to prevent migraines:  Amitriptyline  In the past 4 weeks, the patient has gone to an Urgent Care or Emergency Room 0 times times due to headaches.    Vascular Disease:  She presents for follow up of vascular disease.  She never takes nitroglycerin. She takes daily aspirin.    Reason for visit:  Year    She eats 2-3 servings of fruits and vegetables daily.She consumes 3 sweetened beverage(s) daily.She exercises with enough effort to increase her heart rate 10 to 19 minutes per day.  She exercises with enough effort to increase her heart rate 3 or less days per week. She is missing 5 dose(s) of medications per week.    Today's PHQ-9         PHQ-9 Total Score: 8    PHQ-9 Q9 Thoughts of better off dead/self-harm past 2 weeks :   Not at all    How difficult have " these problems made it for you to do your work, take care of things at home, or get along with other people: Somewhat difficult  Today's BON-7 Score: 8      Today's PHQ-9 Score:       7/11/2023     3:48 PM   PHQ-9 SCORE   PHQ-9 Total Score MyChart 8 (Mild depression)   PHQ-9 Total Score 8           Social History     Tobacco Use    Smoking status: Every Day     Packs/day: 0.50     Years: 15.00     Pack years: 7.50     Types: Cigarettes     Passive exposure: Current    Smokeless tobacco: Never    Tobacco comments:     Pt. smokes appx. 6 cigarettes daily   Substance Use Topics    Alcohol use: Yes     Alcohol/week: 0.0 standard drinks of alcohol     Comment: Rarely             7/19/2022     1:12 PM   Alcohol Use   Prescreen: >3 drinks/day or >7 drinks/week? No          No data to display              Reviewed orders with patient.  Reviewed health maintenance and updated orders accordingly - Yes  BP Readings from Last 3 Encounters:   07/11/23 128/70   06/06/23 117/76   10/06/22 110/58    Wt Readings from Last 3 Encounters:   07/11/23 93 kg (205 lb)   06/06/23 93 kg (205 lb)   07/19/22 102.5 kg (226 lb)                  Patient Active Problem List   Diagnosis    Chronic low back pain    MEDICAL HISTORY OF - VERY DIFFICULT LAB DRAW and IV START    Allergic rhinitis    Tobacco use disorder    Family history of breast cancer    Intermittent asthma    Migraine headache    Melasma    Hiatal hernia    Alpha-thalassemia (H)    Aortic regurgitation    Mild major depression (H)    Morbid obesity (H)    Hyperlipidemia LDL goal <100    Pre-diabetes    Anxiety    PTSD (post-traumatic stress disorder)    Chronic insomnia    Restless legs syndrome (RLS)    AGUSTIN (obstructive sleep apnea)- mild (AHI 9)     Past Surgical History:   Procedure Laterality Date    BIOPSY BREAST SEED LOCALIZATION Left 01/21/2016    Procedure: BIOPSY BREAST SEED LOCALIZATION;  Surgeon: Perry Desai MD;  Location: [facility]    COLONOSCOPY  03/10/2022     COLONOSCOPY N/A 3/10/2022    Procedure: COLONOSCOPY, FLEXIBLE, WITH LESION REMOVAL USING SNARE;  Surgeon: Pb Islas MD;  Location: [facility]    COLONOSCOPY WITH CO2 INSUFFLATION N/A 3/10/2022    Procedure: COLONOSCOPY, WITH CO2 INSUFFLATION;  Surgeon: Pb Islas MD;  Location: [facility]    DILATION AND CURETTAGE, HYSTEROSCOPY DIAGNOSTIC, COMBINED N/A 05/24/2016    Procedure: COMBINED DILATION AND CURETTAGE, HYSTEROSCOPY DIAGNOSTIC;  Surgeon: Adis Cummings MD;  Location: [facility] OR    ESOPHAGOSCOPY, GASTROSCOPY, DUODENOSCOPY (EGD), COMBINED  07/08/2014    Procedure: COMBINED ESOPHAGOSCOPY, GASTROSCOPY, DUODENOSCOPY (EGD), BIOPSY SINGLE OR MULTIPLE;  Surgeon: Rodolfo Carlin MD;  Location:  GI    EYE SURGERY  1975    trauma    HERNIA REPAIR  1972    infant    HYSTERECTOMY TOTAL ABDOMINAL N/A 06/28/2016    Procedure: HYSTERECTOMY TOTAL ABDOMINAL;  Surgeon: Adis Cummings MD;  Location: [facility] OR    LAPAROSCOPIC CHOLECYSTECTOMY  01/01/1991    Cholecystectomy, Laparoscopic    LAPAROSCOPY DIAGNOSTIC (GYN) N/A 06/28/2016    Procedure: LAPAROSCOPY DIAGNOSTIC (GYN);  Surgeon: Adis Cummings MD;  Location: [facility]    LAPAROTOMY, LYSIS ADHESIONS, COMBINED N/A 06/28/2016    Procedure: COMBINED LAPAROTOMY, LYSIS ADHESIONS;  Surgeon: Adis Cummings MD;  Location: [facility]    MARSUPIALIZATION BARTHOLIN CYST Left 10/13/2020    Procedure: MARSUPIALIZATION OF LEFT BARTHOLIN'S CYST;  Surgeon: Chris Freeman MD;  Location: [facility] OR    PELVIS LAPAROSCOPY,DX  01/01/2001    Laparoscopy for endometriosis    SURGICAL HISTORY OF -   1971    left ovarian surgery for herniation, benign 1971       Social History     Tobacco Use    Smoking status: Every Day     Packs/day: 0.50     Years: 15.00     Pack years: 7.50     Types: Cigarettes     Passive exposure: Current    Smokeless tobacco: Never    Tobacco comments:     Pt. smokes appx. 6 cigarettes daily   Substance Use Topics    Alcohol use: Yes      Alcohol/week: 0.0 standard drinks of alcohol     Comment: Rarely     Family History   Problem Relation Age of Onset    Cardiovascular Mother         long QT syndrom    Heart Disease Mother         entire family has heart problems of some type    Hypertension Mother     Depression Mother     Breast Cancer Mother     Thyroid Disease Mother     Osteoporosis Mother     Thyroid Disease Mother     Dementia Mother     Heart Disease Father         not sure about details, had stroke    Hypertension Father     Heart Disease Brother         CP Hypertension-7 brothers-5 alive right now     Hypertension Brother     Heart Disease Brother         CP Hypertension    Genetic Disorder Brother     Heart Disease Sister         stroke     Diabetes Sister     Heart Disease Brother         long QT syndrome- at age of 16    Heart Disease Maternal Grandmother     Hypertension Maternal Grandmother     Heart Disease Maternal Grandfather     Hypertension Maternal Grandfather     Diabetes Sister         6 sisters    Musculoskeletal Disorder Sister     Genetic Disorder Sister         lupus    Hypertension Sister     Diabetes Maternal Uncle     Hypertension Sister     Cerebrovascular Disease Sister     Hypertension Brother     Hypertension Paternal Grandmother     Hypertension Paternal Grandfather     Hypertension Sister     Asthma Sister     Allergies Other         most everyone in family has some type of allergy    Asthma Other     Cancer Other 40        one maternal cousin with bca in 50s, one paternal cousin with bca in 40s    Cerebrovascular Disease Sister     Osteoporosis Sister     Gynecology Sister         3 sisters with PCOS    Blood Disease Sister         trade for sickle cell anemia    Diabetes Sister     Hypertension Sister     Cerebrovascular Disease Sister     Depression Sister     Asthma Sister     Osteoporosis Sister     Genetic Disorder Sister     Diabetes Other     Hypertension Cousin     Other Cancer Cousin     Breast  Cancer Cousin     Other Cancer Other     Asthma Nephew     Thyroid Disease Other     Cancer - colorectal No family hx of     Long QT syndrome Mother     Acute Myocardial Infarction Mother     Cerebrovascular Disease Father     Acute Myocardial Infarction Sister     Cerebrovascular Disease Sister     Hypertension Sister     Long QT syndrome Brother     Acute Myocardial Infarction Sister     Cerebrovascular Disease Sister     Hypertension Sister     Pulmonary Hypertension Brother          Current Outpatient Medications   Medication Sig Dispense Refill    acetaminophen (TYLENOL) 325 MG tablet Take 325-650 mg by mouth every 6 hours as needed       albuterol (PROAIR HFA) 108 (90 Base) MCG/ACT inhaler Inhale 2 puffs into the lungs every 4 hours as needed for shortness of breath or wheezing 18 g 3    albuterol (PROVENTIL) (2.5 MG/3ML) 0.083% neb solution Take 1 vial (2.5 mg) by nebulization every 6 hours as needed for shortness of breath / dyspnea 3 mL 3    aspirin 81 MG tablet Take 81 mg by mouth daily       camphor-menthol (SARNA) 0.5-0.5 % external lotion Apply 1 mL topically every 6 hours as needed for skin care Apply to palms of hands 2-3 times daily 59 mL 1    cetirizine (ZYRTEC) 10 MG tablet Take 1 tablet (10 mg) by mouth daily 30 tablet 0    clonazePAM (KLONOPIN) 0.5 MG tablet TAKE ONE-HALF TO ONE TABLET BY MOUTH AT BEDTIME AS NEEDED 45 tablet 2    diphenhydrAMINE (BENADRYL) 25 MG tablet Take 1-2 tablets (25-50 mg) by mouth every 6 hours as needed for itching or allergies 60 tablet 1    hydrocortisone valerate (WEST-LESLY) 0.2 % external ointment Apply sparingly to affected area three times daily as needed. 60 g 0    ibuprofen (ADVIL/MOTRIN) 600 MG tablet Take 1 tablet (600 mg) by mouth every 6 hours as needed for pain Take w/ food 30 tablet 0    ipratropium - albuterol 0.5 mg/2.5 mg/3 mL (DUONEB) 0.5-2.5 (3) MG/3ML neb solution Take 1 vial (3 mLs) by nebulization every 6 hours as needed for shortness of breath /  dyspnea or wheezing 3 mL 1    ketoconazole (NIZORAL) 2 % external cream Apply thin layer to soles of feet twice daily for 2 weeks. 30 g 11    ketorolac (TORADOL) 10 MG tablet Take 1 tablet (10 mg) by mouth every 6 hours as needed for pain 4 tablet 11    lidocaine, viscous, (XYLOCAINE) 2 % solution Swish and spit 15 mLs in mouth every 3 hours as needed for moderate pain ; Max 8 doses/24 hour period. 100 mL 0    melatonin 3 MG tablet Take 3 mg by mouth nightly as needed       metFORMIN (GLUCOPHAGE XR) 500 MG 24 hr tablet Take 1 tablet (500 mg) by mouth daily (with dinner) 90 tablet 1    metroNIDAZOLE (METROCREAM) 0.75 % external cream Apply thin layer to face twice daily. 45 g 11    mometasone (ELOCON) 0.1 % external cream Apply thin layer twice daily as needed for itchy areas on the hands and feet. 50 g 11    nortriptyline (PAMELOR) 10 MG capsule Take 1 capsule (10 mg) by mouth At Bedtime Every 3 days 30 capsule 1    pramipexole (MIRAPEX) 0.125 MG tablet Take 1-2 tablets (0.125-0.25 mg) by mouth At Bedtime Take hour before bedtime 180 tablet 3    STATIN NOT PRESCRIBED (INTENTIONAL) Please choose reason not prescribed from choices below.      mometasone-formoterol (DULERA) 200-5 MCG/ACT inhaler Inhale 2 puffs into the lungs 2 times daily for 90 days 13 g 3     Allergies   Allergen Reactions    Loratadine Other (See Comments)     Dries her up and gets rare sinus infection    Morphine Itching     PN: LW Reaction: Itching, Pruritis    Oxycodone Nausea and Vomiting and Itching     Other reaction(s): Diaphoresis  Other reaction(s): Diaphoresis    Sumatriptan      Worsens migraine    Zofran [Ondansetron]      CARDIO recommended never to take this medication     Hydrocodone-Acetaminophen Anxiety    Morphine And Related Anxiety     Becomes tearful     Recent Labs   Lab Test 07/11/23  1719 07/19/22  1445 10/21/21  0625 10/20/21  2237 10/20/21  0902 07/07/21  1010 04/02/21  1331 04/07/20  0000 02/18/20  0907 05/23/16  1642  12/26/15  1007   A1C 5.8* 6.4*  --   --   --  6.1*  --   --   --   --   --    * 92  --   --   --  110*  --   --  129*   < > 94   HDL 28* 28*  --   --   --  29*  --   --  30*   < > 27*   TRIG 276* 227*  --   --   --  225*  --   --  162*   < > 166*   ALT  --  18 20 24   < > 20 27   < > 20   < > 12   CR  --  0.86 0.90 0.92   < > 0.78 0.81   < > 0.80   < > 0.85   GFRESTIMATED  --  82 75 73   < > 89 85   < > 87   < > 72   GFRESTBLACK  --   --   --   --   --  >90 >90   < > >90   < > 87   POTASSIUM  --  3.7 4.1 3.9   < > 4.3 3.7   < > 4.0   < > 4.2   TSH  --   --   --   --   --   --   --   --  1.10  --  1.11    < > = values in this interval not displayed.        Breast Cancer Screening:    FHS-7:       7/7/2021     8:58 AM 7/19/2022     1:13 PM 7/11/2023     4:03 PM   Breast CA Risk Assessment (FHS-7)   Did any of your first-degree relatives have breast or ovarian cancer? Yes Yes Yes   Did any of your relatives have bilateral breast cancer? No  Yes   Did any man in your family have breast cancer? No  Yes   Did any woman in your family have breast and ovarian cancer? Yes  Yes   Did any woman in your family have breast cancer before age 50 y? Yes  Yes   Do you have 2 or more relatives with breast and/or ovarian cancer? Yes  Yes   Do you have 2 or more relatives with breast and/or bowel cancer? Yes  Yes     click delete button to remove this line now  Mammogram Screening: Recommended annual mammography and Mammogram Screening: Recommended mammography every 1-2 years with patient discussion and risk factor consideration  Pertinent mammograms are reviewed under the imaging tab.    History of abnormal Pap smear: Status post benign hysterectomy. Health Maintenance and Surgical History updated.      Latest Ref Rng & Units 4/11/2016     6:44 PM 4/11/2016    12:00 AM 8/8/2013    12:00 AM   PAP / HPV   PAP (Historical)   NIL  NIL    HPV 16 DNA NEG Negative      HPV 18 DNA NEG Negative      Other HR HPV NEG Negative     "    Reviewed and updated as needed this visit by clinical staff   Tobacco  Allergies  Meds              Reviewed and updated as needed this visit by Provider      Review of Systems  CONSTITUTIONAL: NEGATIVE for fever, chills, change in weight  INTEGUMENTARY/SKIN: NEGATIVE for worrisome rashes, moles or lesions  EYES: NEGATIVE for vision changes or irritation  ENT: NEGATIVE for ear, mouth and throat problems  RESP: NEGATIVE for significant cough or SOB  BREAST: NEGATIVE for masses, tenderness or discharge  CV: NEGATIVE for chest pain, palpitations or peripheral edema  GI: NEGATIVE for nausea, abdominal pain, heartburn, or change in bowel habits  : NEGATIVE for unusual urinary or vaginal symptoms. No vaginal bleeding.  MUSCULOSKELETAL: NEGATIVE for significant arthralgias or myalgia  NEURO: NEGATIVE for weakness, dizziness or paresthesias  PSYCHIATRIC: NEGATIVE for changes in mood or affect      OBJECTIVE:   Ht 1.556 m (5' 1.25\")   Wt 93 kg (205 lb)   LMP 06/22/2016 (Exact Date)   Breastfeeding No   BMI 38.42 kg/m    Physical Exam  GENERAL: healthy, alert and no distress  EYES: Eyes grossly normal to inspection, PERRL and conjunctivae and sclerae normal  NECK: no adenopathy, no asymmetry, masses, or scars and thyroid normal to palpation  RESP: lungs clear to auscultation - no rales, rhonchi or wheezes  CV: regular rate and rhythm, normal S1 S2, no S3 or S4, no murmur, click or rub, no peripheral edema and peripheral pulses strong  ABDOMEN: soft, nontender, no hepatosplenomegaly, no masses and bowel sounds normal  MS: no gross musculoskeletal defects noted, no edema    Results for orders placed or performed in visit on 07/11/23   Lipid panel reflex to direct LDL Non-fasting     Status: Abnormal   Result Value Ref Range    Cholesterol 184 <200 mg/dL    Triglycerides 276 (H) <150 mg/dL    Direct Measure HDL 28 (L) >=50 mg/dL    LDL Cholesterol Calculated 101 (H) <=100 mg/dL    Non HDL Cholesterol 156 (H) <130 " mg/dL    Narrative    Cholesterol  Desirable:  <200 mg/dL    Triglycerides  Normal:  Less than 150 mg/dL  Borderline High:  150-199 mg/dL  High:  200-499 mg/dL  Very High:  Greater than or equal to 500 mg/dL    Direct Measure HDL  Female:  Greater than or equal to 50 mg/dL   Male:  Greater than or equal to 40 mg/dL    LDL Cholesterol  Desirable:  <100mg/dL  Above Desirable:  100-129 mg/dL   Borderline High:  130-159 mg/dL   High:  160-189 mg/dL   Very High:  >= 190 mg/dL    Non HDL Cholesterol  Desirable:  130 mg/dL  Above Desirable:  130-159 mg/dL  Borderline High:  160-189 mg/dL  High:  190-219 mg/dL  Very High:  Greater than or equal to 220 mg/dL   Hemoglobin A1c     Status: Abnormal   Result Value Ref Range    Hemoglobin A1C 5.8 (H) 0.0 - 5.6 %   Urine Drug Confirmation Panel     Status: None    Narrative    Interpretation:  Absent or none detected  This test was developed and its performance characteristics determined by the [facility],  Special Chemistry Laboratory. It has not been cleared or approved by the FDA. The laboratory is regulated under CLIA as qualified to perform high-complexity testing. This test is used for clinical purposes. It should not be regarded as investigational or for research.    Drugs with concentrations less than the cutoff will not be reported.  The drugs with applicable detection cutoff limits that are included within the Drug Confirmation Panel are:    The following drugs are detected with a cutoff of 3 ng/ml: FENTANYL    The following drugs are detected with a cutoff of 5 ng/mL: 6-ACETYLMORPHINE, BUPRENORPHINE, NALOXONE, NORBUPRENORPHINE, NORFENTANYL    The following drugs are detected with a cutoff of 10 ng/mL: SUFENTANIL    The following drugs are detected with a cutoff of 20 ng/mL: PCP (PHENCYCLIDINE)    The following drugs are detected with a cutoff of 50 ng/mL: 7-AMINOCLONAZEPAM, 7-AMINOFLUNITRAZEPAM, ALPRAZOLAM, AMPHETAMINE, A-OH-ALPRAZOLAM,  A-OH-MIDAZOLAM, A-OH-TRIAZOLAM, BENZOYLECGONINE (Cocaine Metabolite), CLONAZEPAM, COCAETHYLENE (Cocaine Metabolite), CODEINE, DIAZEPAM, DIHYDROCODEINE, EDDP (Methadone Metabolite), HYDROCODONE, HYDROMORPHONE, LORAZEPAM, MDA (3,4-Methylenedioxyamphetamine), MDEA (3,3-Jjtefbkblqaasv-Z-ethylcathinone), MDMA (Methylenedioxyamphetamine,Ecstasy), MEPERIDINE, METHADONE, METHAMPHETAMINE, METHYLPHENIDATE (Ritalin), MORPHINE, NALTREXONE, N-DESMETH-TAPENTADOL, NORCODEINE, NORDIAZEPAM, NORMEPERIDINE, O-MIKAELA-TRAMADOL, OXAZEPAM, OXYCODONE, OXYMORPHONE, PROPOXYPHENE, RITALINIC ACID, TAPENTADOL, TEMAZEPAM, THEBAINE, TRAMADOL    The following drugs are detected with a cutoff of 100 ng/mL: GABAPENTIN, KETAMINE    The following drugs are detected with a cutoff of 200 ng/mL: PREGABALIN     Urine Creatinine for Drug Screen Panel     Status: None   Result Value Ref Range    Creatinine Urine for Drug Screen 214 mg/dL   [id number] - Urine Drug Confirmation Panel (Comprehensive)     Status: None    Narrative    The following orders were created for panel order [id number] - Urine Drug Confirmation Panel (Comprehensive).  Procedure                               Abnormality         Status                     ---------                               -----------         ------                     Urine Drug Confirmation ...[[id number]]                      Final result               Urine Creatinine for [place]w...[[id number]]                      Final result                 Please view results for these tests on the individual orders.         ASSESSMENT/PLAN:   (Z00.00) Routine general medical examination at a health care facility  (primary encounter diagnosis)  Comment: See counseling.      (E78.5) Hyperlipidemia LDL goal <100  Comment: Needs improvement, the patient declined a statin due to intolerance.  orders and follow up as documented in [facility], reviewed diet, exercise and weight control, recommended sodium restriction, very strongly urged to quit  smoking to reduce cardiovascular risk.    Plan: Lipid panel reflex to direct LDL Non-fasting            (R06.02) SOB (shortness of breath)  Plan: albuterol (PROAIR HFA) 108 (90 Base) MCG/ACT         inhaler      (R00.0) Tachycardia  Resolved.    (J45.901) Exacerbation of asthma, unspecified asthma severity, unspecified whether persistent  Improved.  Plan: albuterol (PROAIR HFA) 108 (90 Base) MCG/ACT         inhaler      (R05.3) Persistent cough  Plan: albuterol (PROAIR HFA) 108 (90 Base) MCG/ACT         inhaler    (L29.9) Itching  Plan: camphor-menthol (SARNA) 0.5-0.5 % external         lotion       (R73.03) Pre-diabetes  Plan: metFORMIN (GLUCOPHAGE XR) 500 MG 24 hr tablet        (G25.81) Restless legs syndrome (RLS)  Plan: pramipexole (MIRAPEX) 0.125 MG tablet      (B35.3) Tinea pedis of both feet  Plan: ketoconazole (NIZORAL) 2 % external cream      (F51.01) Primary insomnia  Plan: clonazePAM (KLONOPIN) 0.5 MG tablet      (J01.90) Acute sinusitis with coexisting condition, need prophylactic treatment  Plan: ketorolac (TORADOL) 10 MG tablet      (F32.0) Mild major depression (H)  Stable, continue present management    (E66.01) Morbid obesity (H)  Weight management plan: Discussed healthy diet and exercise guidelines      (D56.0) Alpha-thalassemia (H)  Stable.    (F17.200) Nicotine dependence, uncomplicated, unspecified nicotine product type  Plan: [place] Quit Partner Referral      (Z87.891) Personal history of tobacco use  Plan: Prof fee: Shared Decision Making for Lung         Cancer Screening, CT Chest Lung Cancer Scrn Low        Dose wo      (Z51.81) Encounter for therapeutic drug monitoring  Plan: [id number] - Urine Drug Confirmation Panel         (Comprehensive)      (Z12.31) Encounter for screening mammogram for breast cancer  Plan: *MA Screening Digital Bilateral      (R10.32) LLQ abdominal pain  Plan: CT Abdomen Pelvis w Contrast  History of intermittent complaints of diarrhea with alternating constipation,  "colonoscopy reviewed. History of IBS and diverticulitis diagnosis.    (Z13.1) Screening for diabetes mellitus    Plan: Hemoglobin A1c      Patient has been advised of split billing requirements and indicates understanding: Yes      COUNSELING:  Reviewed preventive health counseling, as reflected in patient instructions  Special attention given to:        Regular exercise       Healthy diet/nutrition       Vision screening       Hearing screening       Pneumococcal Vaccine                Aspirin prophylaxis       Osteoporosis prevention/bone health       Colorectal Cancer Screening       Consider Hep C screening for all patients one time for ages 18-79 years       Consider lung cancer screening for ages 55-80 years (77 for Medicare) and 20 pack-year smoking history        Advance Care Planning      BMI:   Estimated body mass index is 38.42 kg/m  as calculated from the following:    Height as of this encounter: 1.556 m (5' 1.25\").    Weight as of this encounter: 93 kg (205 lb).   Weight management plan: Discussed healthy diet and exercise guidelines      She reports that she has been smoking cigarettes. She has a 7.50 pack-year smoking history. She has been exposed to tobacco smoke. She has never used smokeless tobacco.  Nicotine/Tobacco Cessation Plan:   Information offered: Patient not interested at this time          Roshan Pelayo MD  [facility]  Lung Cancer Screening Shared Decision Making Visit     Jessie Seymour, a 52 year old female, is eligible for lung cancer screening    History   Smoking Status    Every Day    Packs/day: 0.50    Years: 15.00    Types: Cigarettes   Smokeless Tobacco    Never       I have discussed with patient the risks and benefits of screening for lung cancer with low-dose CT.     The risks include:    radiation exposure: one low dose chest CT has as much ionizing radiation as about 15 chest x-rays, or 6 months of background radiation living in [place]  "     false positives: most findings/nodules are NOT cancer, but some might still require additional diagnostic evaluation, including biopsy    over-diagnosis: some slow growing cancers that might never have been clinically significant will be detected and treated unnecessarily     The benefit of early detection of lung cancer is contingent upon adherence to annual screening or more frequent follow up if indicated.     Furthermore, to benefit from screening, Jessie must be willing and able to undergo diagnostic procedures, if indicated. Although no specific guide is available for determining severity of comorbidities, it is reasonable to withhold screening in patients who have greater mortality risk from other diseases.     We did discuss that the best way to prevent lung cancer is to not smoke.    Some patients may value a numeric estimation of lung cancer risk when evaluating if lung cancer screening is right for them, here is one calculator:    ShouldIScreen

## 2023-07-12 LAB
CHOLEST SERPL-MCNC: 184 MG/DL
CREAT UR-MCNC: 214 MG/DL
HDLC SERPL-MCNC: 28 MG/DL
LDLC SERPL CALC-MCNC: 101 MG/DL
NONHDLC SERPL-MCNC: 156 MG/DL
TRIGL SERPL-MCNC: 276 MG/DL

## 2023-07-12 RX ORDER — CLONAZEPAM 0.5 MG/1
TABLET ORAL
Qty: 45 TABLET | Refills: 2 | Status: SHIPPED | OUTPATIENT
Start: 2023-07-12

## 2023-08-15 ENCOUNTER — ANCILLARY PROCEDURE (OUTPATIENT)
Dept: CT IMAGING | Facility: CLINIC | Age: 53
End: 2023-08-15
Attending: FAMILY MEDICINE
Payer: COMMERCIAL

## 2023-08-15 ENCOUNTER — ANCILLARY PROCEDURE (OUTPATIENT)
Dept: MAMMOGRAPHY | Facility: CLINIC | Age: 53
End: 2023-08-15
Attending: FAMILY MEDICINE
Payer: COMMERCIAL

## 2023-08-15 DIAGNOSIS — Z12.31 ENCOUNTER FOR SCREENING MAMMOGRAM FOR BREAST CANCER: ICD-10-CM

## 2023-08-15 DIAGNOSIS — Z87.891 PERSONAL HISTORY OF TOBACCO USE: ICD-10-CM

## 2023-08-15 DIAGNOSIS — R10.32 LLQ ABDOMINAL PAIN: ICD-10-CM

## 2023-08-15 LAB
CREAT BLD-MCNC: 0.8 MG/DL (ref 0.5–1)
GFR SERPL CREATININE-BSD FRML MDRD: >60 ML/MIN/1.73M2

## 2023-08-15 PROCEDURE — 82565 ASSAY OF CREATININE: CPT

## 2023-08-15 PROCEDURE — 77063 BREAST TOMOSYNTHESIS BI: CPT

## 2023-08-15 PROCEDURE — 71271 CT THORAX LUNG CANCER SCR C-: CPT | Performed by: RADIOLOGY

## 2023-08-15 PROCEDURE — 74177 CT ABD & PELVIS W/CONTRAST: CPT | Mod: GC | Performed by: RADIOLOGY

## 2023-08-15 PROCEDURE — 77067 SCR MAMMO BI INCL CAD: CPT

## 2023-08-15 RX ORDER — IOPAMIDOL 755 MG/ML
113 INJECTION, SOLUTION INTRAVASCULAR ONCE
OUTPATIENT
Start: 2023-08-15 | End: 2023-08-15

## 2023-08-15 RX ORDER — IOPAMIDOL 755 MG/ML
113 INJECTION, SOLUTION INTRAVASCULAR ONCE
Status: COMPLETED | OUTPATIENT
Start: 2023-08-15 | End: 2023-08-15

## 2023-08-15 RX ADMIN — IOPAMIDOL 113 ML: 755 INJECTION, SOLUTION INTRAVASCULAR at 09:49

## 2023-11-15 ENCOUNTER — IMMUNIZATION (OUTPATIENT)
Dept: NURSING | Facility: CLINIC | Age: 53
End: 2023-11-15
Payer: COMMERCIAL

## 2023-11-15 PROCEDURE — 90480 ADMN SARSCOV2 VAC 1/ONLY CMP: CPT

## 2023-11-15 PROCEDURE — 91320 SARSCV2 VAC 30MCG TRS-SUC IM: CPT

## 2023-11-15 PROCEDURE — 90686 IIV4 VACC NO PRSV 0.5 ML IM: CPT

## 2023-11-15 PROCEDURE — 90471 IMMUNIZATION ADMIN: CPT

## 2023-11-30 ENCOUNTER — OFFICE VISIT (OUTPATIENT)
Dept: URGENT CARE | Facility: URGENT CARE | Age: 53
End: 2023-11-30
Payer: COMMERCIAL

## 2023-11-30 VITALS
DIASTOLIC BLOOD PRESSURE: 73 MMHG | RESPIRATION RATE: 16 BRPM | SYSTOLIC BLOOD PRESSURE: 117 MMHG | OXYGEN SATURATION: 97 % | TEMPERATURE: 97.4 F | WEIGHT: 205 LBS | HEART RATE: 96 BPM | BODY MASS INDEX: 38.42 KG/M2

## 2023-11-30 DIAGNOSIS — J06.9 UPPER RESPIRATORY TRACT INFECTION, UNSPECIFIED TYPE: ICD-10-CM

## 2023-11-30 DIAGNOSIS — J01.90 ACUTE SINUSITIS WITH SYMPTOMS > 10 DAYS: Primary | ICD-10-CM

## 2023-11-30 PROCEDURE — 99213 OFFICE O/P EST LOW 20 MIN: CPT | Performed by: EMERGENCY MEDICINE

## 2023-11-30 RX ORDER — BENZONATATE 100 MG/1
100 CAPSULE ORAL 3 TIMES DAILY PRN
Qty: 25 CAPSULE | Refills: 0 | Status: SHIPPED | OUTPATIENT
Start: 2023-11-30

## 2023-11-30 NOTE — PROGRESS NOTES
Assessment & Plan     Diagnosis:    ICD-10-CM    1. Acute sinusitis with symptoms > 10 days  J01.90 amoxicillin-clavulanate (AUGMENTIN) 875-125 MG tablet      2. Upper respiratory tract infection, unspecified type  J06.9 benzonatate (TESSALON) 100 MG capsule          Medical Decision Making:  Jessie Maddox is a 53 year old female who presents to clinic today for evaluation of facial pain/pressure.  Signs and symptoms are consistent with sinusitis.  Discussed viral vs bacterial sinusitis with the patient.  URI symptoms noted include cough.  Given duration >10 days, worsening over the last few days and now with green/yellow nasal discharge; I discussed antibiotics for bacterial sinusitis. Outpatient medications ordered as noted above.  No evidence of fungal sinusitis, meningitis, encephalitis, cavernous sinus thrombosis, serious bacterial infection otherwise.  Supportive outpatient management indicated.  Patient verbalizes understanding and agreement with the plan including reasons to go to the ER. All questions answered.      Lennox Landrum PA-C  [facility]    Subjective     Jessie Maddox is a 53 year old female who presents to clinic today for the following health issues:  Chief Complaint   Patient presents with    URI    Cough     Cough for three weeks, chest is getting tighter across back.       HPI  Patient reports having URI symptoms for the past 3 weeks including cough, nasal congestion, ear fullness.  Over the last few days she has been experiencing increased facial pain and pressure, yellow/green discharge from the nose, tightness across her back.  She not feeling short of breath or having any chest pain, is concerned mainly for the facial pain and pressure and a sinus infection.  She notes she has been using all of her allergy and sinus medications with no improvement.  No vision changes, severe headaches, shortness of breath or other concerns.     Review of Systems    See  HPI    Objective      Vitals: /73 (BP Location: Left arm, Patient Position: Sitting, Cuff Size: Adult Large)   Pulse 96   Temp 97.4  F (36.3  C) (Tympanic)   Resp 16   Wt 93 kg (205 lb)   LMP 06/22/2016 (Exact Date)   SpO2 97%   BMI 38.42 kg/m      Patient Vitals for the past 24 hrs:   BP Temp Temp src Pulse Resp SpO2 Weight   11/30/23 1734 117/73 97.4  F (36.3  C) Tympanic 96 16 97 % 93 kg (205 lb)       Vital signs reviewed by: Lennox Landrum PA-C    Physical Exam   Constitutional: Patient is alert and cooperative. No acute distress.  HENT:   Right Ear: External ear normal. TM is normal  Left Ear: External ear normal. TM is normal.  Nose: Nasal turbinates are swollen and erythematous. Yellow rhinorrhea noted. No epistaxis.  Mouth: Normal tongue and tonsil. Posterior oropharynx is clear.  Cardiovascular: Regular rate and rhythm  Pulmonary/Chest: Lungs are clear to auscultation throughout. Effort normal. No respiratory distress. No wheezes, rales or rhonchi.  Neurological: Alert and oriented x3. CN 3-7 and 9-12 intact.  Skin: No rash noted on visualized skin.  Psychiatric:The patient has a normal mood and affect.       Lennox Landrum PA-C, November 30, 2023

## 2024-01-28 ENCOUNTER — VIRTUAL VISIT (OUTPATIENT)
Dept: URGENT CARE | Facility: CLINIC | Age: 54
End: 2024-01-28
Payer: COMMERCIAL

## 2024-01-28 ENCOUNTER — NURSE TRIAGE (OUTPATIENT)
Dept: NURSING | Facility: CLINIC | Age: 54
End: 2024-01-28

## 2024-01-28 DIAGNOSIS — U07.1 COVID-19: Primary | ICD-10-CM

## 2024-01-28 PROCEDURE — 99443 PR PHYSICIAN TELEPHONE EVALUATION 21-30 MIN: CPT | Mod: 93

## 2024-01-28 RX ORDER — BENZONATATE 100 MG/1
100 CAPSULE ORAL 3 TIMES DAILY PRN
Qty: 20 CAPSULE | Refills: 0 | Status: SHIPPED | OUTPATIENT
Start: 2024-01-28 | End: 2024-02-01

## 2024-01-28 NOTE — PROGRESS NOTES
"Jacy is a 53 year old who is being evaluated via a billable telephone visit.      What phone number would you like to be contacted at? [phone]  How would you like to obtain your AVS? [place]    Distant Location (provider location):  Off-site    Assessment & Plan     (U07.1) COVID-19  (primary encounter diagnosis)    Plan: nirmatrelvir and ritonavir (PAXLOVID) 300         mg/100 mg therapy pack  Normal labs  No drug interactions    BMI  Estimated body mass index is 38.42 kg/m  as calculated from the following:    Height as of 7/11/23: 1.556 m (5' 1.25\").    Weight as of 11/30/23: 93 kg (205 lb).       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    Physical Exam   General: Alert and no distress //Respiratory: No audible wheeze, cough, or shortness of breath // Psychiatric:  Appropriate affect, tone, and pace of words      Phone call duration: 25 minutes  Signed Electronically by: Virtual Urgent Care    "

## 2024-01-28 NOTE — TELEPHONE ENCOUNTER
COVID Positive/Requesting COVID treatment    Pt has VV appt for today    Patient is positive for COVID and requesting treatment options.    Date of positive COVID test (PCR or at home)? Home test positive 1/28/24  Current COVID symptoms: cough, shortness of breath or difficulty breathing, fatigue, new loss of taste or smell, sore throat, and congestion or runny nose  Date COVID symptoms began: 1/27/24    Pt has been triaged as high risk for complications of covid with Hx of asthma, URI, bronchitis.  Pt is requesting an antibiotic stating she is 'feeling a little tightness in lung that always happens when she's starting an URI'  O2 is 97% at time of this call  Pt reports no SOB at rest and mild SOB if walking up stairs    Denies;  Fever  Moderate/severe breathing difficulty at time of this call  Chest pain/pressure    According to the protocol, patient should call PCP within 24 hours.  Care advice given. Patient verbalizes understanding and agrees with plan of care. Transferred to scheduling and was able to schedule VV for today.    Bailey Bruce RN, Nurse Advisor 12:58 PM 1/28/2024  Reason for Disposition   [1] HIGH RISK patient (e.g., weak immune system, age > 64 years, obesity with BMI 30 or higher, pregnant, chronic lung disease or other chronic medical condition) AND [2] COVID symptoms (e.g., cough, fever)  (Exceptions: Already seen by PCP and no new or worsening symptoms.)    Additional Information   Negative: SEVERE difficulty breathing (e.g., struggling for each breath, speaks in single words)   Negative: Difficult to awaken or acting confused (e.g., disoriented, slurred speech)   Negative: Bluish (or gray) lips or face now   Negative: Shock suspected (e.g., cold/pale/clammy skin, too weak to stand, low BP, rapid pulse)   Negative: Sounds like a life-threatening emergency to the triager   Negative: [1] Diagnosed or suspected COVID-19 AND [2] symptoms lasting 3 or more weeks   Negative: [1] COVID-19 exposure  AND [2] no symptoms   Negative: COVID-19 vaccine reaction suspected (e.g., fever, headache, muscle aches) occurring 1 to 3 days after getting vaccine   Negative: COVID-19 vaccine, questions about   Negative: [1] Lives with someone known to have influenza (flu test positive) AND [2] flu-like symptoms (e.g., cough, runny nose, sore throat, SOB; with or without fever)   Negative: [1] Possible COVID-19 symptoms AND [2] triager concerned about severity of symptoms or other causes   Negative: COVID-19 and breastfeeding, questions about   Negative: SEVERE or constant chest pain or pressure  (Exception: Mild central chest pain, present only when coughing.)   Negative: MODERATE difficulty breathing (e.g., speaks in phrases, SOB even at rest, pulse 100-120)   Negative: [1] Headache AND [2] stiff neck (can't touch chin to chest)   Negative: Oxygen level (e.g., pulse oximetry) 90 percent or lower   Negative: Chest pain or pressure  (Exception: MILD central chest pain, present only when coughing.)   Negative: [1] Drinking very little AND [2] dehydration suspected (e.g., no urine > 12 hours, very dry mouth, very lightheaded)   Negative: Patient sounds very sick or weak to the triager   Negative: MILD difficulty breathing (e.g., minimal/no SOB at rest, SOB with walking, pulse <100)   Negative: Fever > 103 F (39.4 C)   Negative: [1] Fever > 101 F (38.3 C) AND [2] age > 60 years   Negative: [1] Fever > 100.0 F (37.8 C) AND [2] bedridden (e.g., CVA, chronic illness, recovering from surgery)   Negative: Oxygen level (e.g., pulse oximetry) 91 to 94 percent    Protocols used: Coronavirus (COVID-19) Diagnosed or Sfidravrh-T-ME

## 2024-02-01 ENCOUNTER — VIRTUAL VISIT (OUTPATIENT)
Dept: FAMILY MEDICINE | Facility: CLINIC | Age: 54
End: 2024-02-01
Attending: FAMILY MEDICINE
Payer: COMMERCIAL

## 2024-02-01 DIAGNOSIS — U07.1 INFECTION DUE TO 2019 NOVEL CORONAVIRUS: ICD-10-CM

## 2024-02-01 DIAGNOSIS — J45.901 EXACERBATION OF ASTHMA, UNSPECIFIED ASTHMA SEVERITY, UNSPECIFIED WHETHER PERSISTENT: ICD-10-CM

## 2024-02-01 DIAGNOSIS — R05.3 PERSISTENT COUGH: ICD-10-CM

## 2024-02-01 DIAGNOSIS — R19.7 DIARRHEA, UNSPECIFIED TYPE: ICD-10-CM

## 2024-02-01 DIAGNOSIS — J01.90 ACUTE SINUSITIS WITH COEXISTING CONDITION, NEED PROPHYLACTIC TREATMENT: ICD-10-CM

## 2024-02-01 DIAGNOSIS — R00.0 TACHYCARDIA: ICD-10-CM

## 2024-02-01 DIAGNOSIS — Z13.9 ENCOUNTER FOR SCREENING INVOLVING SOCIAL DETERMINANTS OF HEALTH (SDOH): Primary | ICD-10-CM

## 2024-02-01 DIAGNOSIS — R06.02 SOB (SHORTNESS OF BREATH): ICD-10-CM

## 2024-02-01 PROCEDURE — 99214 OFFICE O/P EST MOD 30 MIN: CPT | Mod: 95 | Performed by: FAMILY MEDICINE

## 2024-02-01 RX ORDER — HYDROCORTISONE 25 MG/G
CREAM TOPICAL 2 TIMES DAILY PRN
Qty: 30 G | Refills: 0 | Status: SHIPPED | OUTPATIENT
Start: 2024-02-01

## 2024-02-01 RX ORDER — IPRATROPIUM BROMIDE AND ALBUTEROL SULFATE 2.5; .5 MG/3ML; MG/3ML
1 SOLUTION RESPIRATORY (INHALATION) EVERY 6 HOURS PRN
Qty: 3 ML | Refills: 1 | Status: SHIPPED | OUTPATIENT
Start: 2024-02-01

## 2024-02-01 RX ORDER — KETOROLAC TROMETHAMINE 10 MG/1
1 TABLET, FILM COATED ORAL EVERY 6 HOURS PRN
Qty: 4 TABLET | Refills: 11 | Status: SHIPPED | OUTPATIENT
Start: 2024-02-01

## 2024-02-01 ASSESSMENT — ASTHMA QUESTIONNAIRES
QUESTION_2 LAST FOUR WEEKS HOW OFTEN HAVE YOU HAD SHORTNESS OF BREATH: MORE THAN ONCE A DAY
QUESTION_4 LAST FOUR WEEKS HOW OFTEN HAVE YOU USED YOUR RESCUE INHALER OR NEBULIZER MEDICATION (SUCH AS ALBUTEROL): THREE OR MORE TIMES PER DAY
QUESTION_3 LAST FOUR WEEKS HOW OFTEN DID YOUR ASTHMA SYMPTOMS (WHEEZING, COUGHING, SHORTNESS OF BREATH, CHEST TIGHTNESS OR PAIN) WAKE YOU UP AT NIGHT OR EARLIER THAN USUAL IN THE MORNING: ONCE A WEEK
QUESTION_5 LAST FOUR WEEKS HOW WOULD YOU RATE YOUR ASTHMA CONTROL: POORLY CONTROLLED
ACT_TOTALSCORE: 11
EMERGENCY_ROOM_LAST_YEAR_TOTAL: ONE
ACT_TOTALSCORE: 11
QUESTION_1 LAST FOUR WEEKS HOW MUCH OF THE TIME DID YOUR ASTHMA KEEP YOU FROM GETTING AS MUCH DONE AT WORK, SCHOOL OR AT HOME: A LITTLE OF THE TIME

## 2024-02-01 ASSESSMENT — PATIENT HEALTH QUESTIONNAIRE - PHQ9
SUM OF ALL RESPONSES TO PHQ QUESTIONS 1-9: 21
SUM OF ALL RESPONSES TO PHQ QUESTIONS 1-9: 21
10. IF YOU CHECKED OFF ANY PROBLEMS, HOW DIFFICULT HAVE THESE PROBLEMS MADE IT FOR YOU TO DO YOUR WORK, TAKE CARE OF THINGS AT HOME, OR GET ALONG WITH OTHER PEOPLE: EXTREMELY DIFFICULT

## 2024-02-01 NOTE — COMMUNITY RESOURCES LIST (ENGLISH)
02/01/2024   [facility] [employer]  N/A  For questions about this resource list or additional care needs, please contact your primary care clinic or care manager.  Phone: [phone]   Email: N/A   Address: [address] [place] [id number]   Hours: N/A        Financial Stability       Rent and mortgage payment assistance  1  [employer] - Home Save Program Distance: 1.55 miles      Phone/Virtual   [address] [place] [address]  Language: English, Palauan  Hours: Mon - Fri 9:00 AM - 5:00 PM  Fees: Free   Phone: (987) 462-6250 Email: services@[employer] Website: https://www.[employer]     2  [facility] - Emergency Assistance Program (EAP) - Rent Assistance Distance: 2.68 miles      In-Person, Phone/Virtual   [address] [place] [address]  Language: American Sign Language, English  Hours: Mon - Fri 8:00 AM - 4:00 PM  Fees: Free   Phone: (814) 657-2186 Email: servicecenterinfo@[facility] Website: https://www.[facility]/residents#human-services          Food and Nutrition       Food pantry  3  [facility] - Food Distribution Distance: 1.96 miles      Pickup   [address] [place] [address]  Language: English  Hours: Fri 5:00 PM - 7:00 PM  Fees: Free   Phone: (940) 832-2977 Email: office@[employer] Website: http://www.[employer].org     4  [facility] Distance: 2.13 miles      In-Person   [address] [place] [address]  Language: English  Hours: Tue 10:00 AM - 11:30 AM , Wed 5:00 PM - 6:30 PM , Fri 10:00 AM - 11:30 AM  Fees: Free   Phone: (415) 664-8839 Email: info@[facility].org Website: http://www.[facility].org/     SNAP application assistance  5  [employer] -   Family Wellness (AIFW) Distance: 1.09 miles      In-Person, Phone/Virtual   [address] [place] [address]  Language: Lithuanian, Armenian, English,  Gujarati, Aneesh, Kazakh, Czech, Czech, Mohawk, Swedish  Hours: Mon - Wed 9:00 AM - 5:00 PM , Thu 12:00 PM - 6:00 PM , Fri 9:00 AM - 5:00 PM , Sun 10:30 AM - 2:00 PM Appt. Only  Fees: Free   Phone: (228) 544-4492 Email: info@[employer] Website: https://www.[employer].org/     6  [facility] Distance: 2.43 miles      In-Person, Phone/Virtual   [address] [id number]  Language: English, Hmong  Hours: Mon - Fri 8:30 AM - 4:30 PM  Fees: Free   Phone: (210) 417-1620 Ext.1 Email: info@[employer] Website: https://www.[employer]/     Soup kitchen or free meals  7  [facility] - Family Table Meal Distance: 1.61 miles      [address] [id number]  Language: English  Hours: Sat 11:30 AM - 12:30 PM  Fees: Free   Phone: (969) 188-5774 Email: deion@[facility] Website: http://www.[facility].[employer]/     8  [facility] Community Supper Distance: 2.13 miles      In-Person   [address] [place] [address]  Language: English  Hours: Wed 5:15 PM - 6:00 PM  Fees: Free   Phone: (117) 513-2824 Email: info@[employer].org Website: http://www.[facility].org/          Important Numbers & Websites       Emergency Services   911  City Services   311  Poison Control   (570) 389-3756  Suicide Prevention Lifeline   (276) 717-8931 (TALK)  Child Abuse Hotline   (649) 373-5796 (4-A-Child)  Sexual Assault Hotline   (183) 255-3709 (HOPE)  National Runaway Safeline   (273) 460-2712 (RUNAWAY)  All-Options Talkline   (504) 600-6099  Substance Abuse Referral   (369) 000-0171 (HELP)

## 2024-02-01 NOTE — PROGRESS NOTES
"    Instructions Relayed to Patient by Virtual Roomer:     Patient is active on [employer]:   Relayed following to patient: \"It looks like you are active on [employer], are you able to join the visit this way? If not, do you need us to send you a link now or would you like your provider to send a link via text or email when they are ready to initiate the visit?\"    Reminded patient to ensure they were logged on to virtual visit by arrival time listed. Documented in appointment notes if patient had flexibility to initiate visit sooner than arrival time. If pediatric virtual visit, ensured pediatric patient along with parent/guardian will be present for video visit.     Patient offered the website [email or web address].org/video-visits and/or phone number to [employer] Help line: [phone]    Jacy is a 53 year old who is being evaluated via a billable video visit.      How would you like to obtain your AVS? [employer]  If the video visit is dropped, the invitation should be resent by: Text to cell phone: [phone]  Will anyone else be joining your video visit? No      Assessment & Plan     Acute sinusitis with coexisting condition, need prophylactic treatment  - ketorolac (TORADOL) 10 MG tablet; Take 1 tablet (10 mg) by mouth every 6 hours as needed for pain    Encounter for screening involving social determinants of health (SDoH)    - ipratropium - albuterol 0.5 mg/2.5 mg/3 mL (DUONEB) 0.5-2.5 (3) MG/3ML neb solution; Take 1 vial (3 mLs) by nebulization every 6 hours as needed for shortness of breath or wheezing    Exacerbation of asthma, unspecified asthma severity, unspecified whether persistent  - ipratropium - albuterol 0.5 mg/2.5 mg/3 mL (DUONEB) 0.5-2.5 (3) MG/3ML neb solution; Take 1 vial (3 mLs) by nebulization every 6 hours as needed for shortness of breath or wheezing    Persistent cough  - ipratropium - albuterol 0.5 mg/2.5 mg/3 mL (DUONEB) 0.5-2.5 (3) MG/3ML neb solution; Take 1 vial (3 mLs) by nebulization " "every 6 hours as needed for shortness of breath or wheezing    Diarrhea, unspecified type  - hydrocortisone, Perianal, (HYDROCORTISONE) 2.5 % cream; Place rectally 2 times daily as needed for hemorrhoids    Infection due to 2019 novel coronavirus  Currently on Paxlovid for COVID infection, visit reviewed from 1/28. Last dose, concerns for diarrhea which I am attributing to Paxlovid side effects and current illness  Symptomatic therapy suggested: push fluids, rest, and use acetaminophen, ibuprofen as needed.        BMI  Estimated body mass index is 38.42 kg/m  as calculated from the following:    Height as of 7/11/23: 1.556 m (5' 1.25\").    Weight as of 11/30/23: 93 kg (205 lb).   Weight management plan: Discussed healthy diet and exercise guidelines          Alejandra Louie is a 53 year old, presenting for the following health issues:  Follow Up  - Currently has Covid, was taking Paxlovid but having a lot of GI issues and some upper respiratory infection symptoms.       2/1/2024    12:49 PM   Additional Questions   Roomed by Girma GAITAN   Accompanied by Self     History of Present Illness       Reason for visit:  Covid Follow up        Medication Followup of Paxlovid  Taking Medication as prescribed: yes  Side Effects:  Diarrhea, GI concerns and a little bit of respiratory stuff (tight in the chest)  Medication Helping Symptoms:  NO-The medicine made her feel worse. Little to no energy        Objective           Vitals:  No vitals were obtained today due to virtual visit.    Physical Exam   GENERAL: alert and no distress  EYES: Eyes grossly normal to inspection.  No discharge or erythema, or obvious scleral/conjunctival abnormalities.  RESP: No audible wheeze, cough, or visible cyanosis.    SKIN: Visible skin clear. No significant rash, abnormal pigmentation or lesions.  NEURO: Cranial nerves grossly intact.  Mentation and speech appropriate for age.  PSYCH: Appropriate affect, tone, and pace of " words      Video-Visit Details    Type of service:  Video Visit   Video Start Time:02:20PM  Video End Time:02:40PM    Originating Location (pt. Location): Home    Distant Location (provider location):  Off-site  Platform used for Video Visit: Franchesca  Signed Electronically by: Roshan Pelayo MD

## 2024-06-11 ENCOUNTER — PATIENT OUTREACH (OUTPATIENT)
Dept: CARE COORDINATION | Facility: CLINIC | Age: 54
End: 2024-06-11
Payer: COMMERCIAL

## 2024-06-12 ENCOUNTER — TRANSFERRED RECORDS (OUTPATIENT)
Dept: HEALTH INFORMATION MANAGEMENT | Facility: CLINIC | Age: 54
End: 2024-06-12
Payer: COMMERCIAL

## 2024-06-25 ENCOUNTER — PATIENT OUTREACH (OUTPATIENT)
Dept: CARE COORDINATION | Facility: CLINIC | Age: 54
End: 2024-06-25
Payer: COMMERCIAL

## 2024-07-02 ENCOUNTER — TRANSFERRED RECORDS (OUTPATIENT)
Dept: HEALTH INFORMATION MANAGEMENT | Facility: CLINIC | Age: 54
End: 2024-07-02
Payer: COMMERCIAL

## 2024-07-16 ENCOUNTER — PATIENT OUTREACH (OUTPATIENT)
Dept: CARE COORDINATION | Facility: CLINIC | Age: 54
End: 2024-07-16
Payer: COMMERCIAL

## 2024-08-13 ENCOUNTER — PATIENT OUTREACH (OUTPATIENT)
Dept: CARE COORDINATION | Facility: CLINIC | Age: 54
End: 2024-08-13
Payer: COMMERCIAL

## 2024-10-19 ENCOUNTER — HEALTH MAINTENANCE LETTER (OUTPATIENT)
Age: 54
End: 2024-10-19

## (undated) DEVICE — BLADE KNIFE SURG 15 371115

## (undated) DEVICE — ESU GROUND PAD ADULT W/CORD E7507

## (undated) DEVICE — LINEN TOWEL PACK X10 5473

## (undated) DEVICE — SU VICRYL 4-0 PS-2 18" UND J496H

## (undated) DEVICE — NDL COUNTER 20CT 31142493

## (undated) DEVICE — TUBING SUCTION 12"X1/4" N612

## (undated) DEVICE — GLOVE PROTEXIS BLUE W/NEU-THERA 6.5  2D73EB65

## (undated) DEVICE — KIT ENDO FIRST STEP DISINFECTANT 200ML W/POUCH EP-4

## (undated) DEVICE — PACK MINOR LITHOTOMY RIDGES

## (undated) DEVICE — SOL NACL 0.9% IRRIG 1000ML BOTTLE 2F7124

## (undated) DEVICE — SUCTION CANISTER MEDIVAC LINER 3000ML W/LID 65651-530

## (undated) DEVICE — LINEN FULL SHEET 5511

## (undated) DEVICE — SU VICRYL 4-0 SH 27" UND J415H

## (undated) DEVICE — LINEN HALF SHEET 5512

## (undated) DEVICE — PAD CHUX UNDERPAD 30X36" P3036C

## (undated) DEVICE — ESU PENCIL W/HOLSTER E2350H

## (undated) DEVICE — SUCTION TIP YANKAUER W/O VENT K86

## (undated) DEVICE — BAG CLEAR TRASH 1.3M 39X33" P4040C

## (undated) DEVICE — PAD CHUX UNDERPAD 23X24" 7136

## (undated) DEVICE — CATH INTERMITTENT CLEAN-CATH FEMALE 14FR 6" VINYL LF 420614

## (undated) RX ORDER — ACETAMINOPHEN 325 MG/1
TABLET ORAL
Status: DISPENSED
Start: 2020-10-13

## (undated) RX ORDER — CEFAZOLIN SODIUM 2 G/100ML
INJECTION, SOLUTION INTRAVENOUS
Status: DISPENSED
Start: 2020-10-13

## (undated) RX ORDER — DEXAMETHASONE SODIUM PHOSPHATE 4 MG/ML
INJECTION, SOLUTION INTRA-ARTICULAR; INTRALESIONAL; INTRAMUSCULAR; INTRAVENOUS; SOFT TISSUE
Status: DISPENSED
Start: 2020-10-13

## (undated) RX ORDER — FENTANYL CITRATE 50 UG/ML
INJECTION, SOLUTION INTRAMUSCULAR; INTRAVENOUS
Status: DISPENSED
Start: 2020-10-13

## (undated) RX ORDER — NEOSTIGMINE METHYLSULFATE 1 MG/ML
VIAL (ML) INJECTION
Status: DISPENSED
Start: 2020-10-13

## (undated) RX ORDER — FENTANYL CITRATE 50 UG/ML
INJECTION, SOLUTION INTRAMUSCULAR; INTRAVENOUS
Status: DISPENSED
Start: 2022-03-10

## (undated) RX ORDER — GLYCOPYRROLATE 0.2 MG/ML
INJECTION INTRAMUSCULAR; INTRAVENOUS
Status: DISPENSED
Start: 2020-10-13

## (undated) RX ORDER — PROPOFOL 10 MG/ML
INJECTION, EMULSION INTRAVENOUS
Status: DISPENSED
Start: 2020-10-13

## (undated) RX ORDER — FENTANYL CITRATE-0.9 % NACL/PF 10 MCG/ML
PLASTIC BAG, INJECTION (ML) INTRAVENOUS
Status: DISPENSED
Start: 2020-10-13

## (undated) RX ORDER — ALBUTEROL SULFATE 0.83 MG/ML
SOLUTION RESPIRATORY (INHALATION)
Status: DISPENSED
Start: 2020-10-13

## (undated) RX ORDER — LIDOCAINE HYDROCHLORIDE 10 MG/ML
INJECTION, SOLUTION EPIDURAL; INFILTRATION; INTRACAUDAL; PERINEURAL
Status: DISPENSED
Start: 2020-10-13